# Patient Record
Sex: FEMALE | Race: WHITE | Employment: FULL TIME | URBAN - METROPOLITAN AREA
[De-identification: names, ages, dates, MRNs, and addresses within clinical notes are randomized per-mention and may not be internally consistent; named-entity substitution may affect disease eponyms.]

---

## 2017-02-17 ENCOUNTER — HOSPITAL ENCOUNTER (OUTPATIENT)
Age: 52
Discharge: HOME | End: 2017-02-17
Attending: EMERGENCY MEDICINE
Payer: COMMERCIAL

## 2017-02-17 VITALS
BODY MASS INDEX: 20 KG/M2 | WEIGHT: 104 LBS | TEMPERATURE: 99 F | HEART RATE: 71 BPM | DIASTOLIC BLOOD PRESSURE: 82 MMHG | SYSTOLIC BLOOD PRESSURE: 155 MMHG | RESPIRATION RATE: 18 BRPM

## 2017-02-17 DIAGNOSIS — J01.90 ACUTE SINUSITIS, RECURRENCE NOT SPECIFIED, UNSPECIFIED LOCATION: Primary | ICD-10-CM

## 2017-02-17 LAB — S PYO AG THROAT QL: POSITIVE

## 2017-02-17 PROCEDURE — 99214 OFFICE O/P EST MOD 30 MIN: CPT

## 2017-02-17 PROCEDURE — 87430 STREP A AG IA: CPT

## 2017-02-17 PROCEDURE — 99213 OFFICE O/P EST LOW 20 MIN: CPT

## 2017-02-17 RX ORDER — AMOXICILLIN AND CLAVULANATE POTASSIUM 875; 125 MG/1; MG/1
1 TABLET, FILM COATED ORAL 2 TIMES DAILY
Qty: 14 TABLET | Refills: 0 | Status: SHIPPED | OUTPATIENT
Start: 2017-02-17 | End: 2017-02-24

## 2017-02-17 NOTE — ED PROVIDER NOTES
Patient Seen in: [facility] AND CLINICS Immediate Care In [address] [place] [address]    History   Patient presents with:  Sore Throat  Cough/URI    Stated Complaint: sorethroat/sinus pressure    HPI    Patient is a 49-year-old female with a history of recurrent sinus infection negative except as noted above. PSFH elements reviewed from today and agreed except as otherwise stated in HPI.     Physical Exam       ED Triage Vitals   BP 02/17/17 1509 155/82 mmHg   Pulse 02/17/17 1509 71   Resp 02/17/17 1509 18   Temp 02/17/17 1509 Prescribed:  Current Discharge Medication List

## 2017-02-17 NOTE — ED INITIAL ASSESSMENT (HPI)
Patient has had sore throat and cough for about 10 days. She is flying to [place] for a soccer tournament tonight. She has been exposed to many sick people at work. Patient has taken Teresita Marshall [address]. Chills and night sweats, did not take temperature.  No flu shot t

## 2017-04-04 ENCOUNTER — OFFICE VISIT (OUTPATIENT)
Dept: OBGYN CLINIC | Facility: CLINIC | Age: 52
End: 2017-04-04

## 2017-04-04 VITALS
SYSTOLIC BLOOD PRESSURE: 130 MMHG | BODY MASS INDEX: 21 KG/M2 | WEIGHT: 109.81 LBS | DIASTOLIC BLOOD PRESSURE: 84 MMHG | HEART RATE: 68 BPM

## 2017-04-04 DIAGNOSIS — Z12.31 SCREENING MAMMOGRAM, ENCOUNTER FOR: ICD-10-CM

## 2017-04-04 DIAGNOSIS — Z01.419 ENCOUNTER FOR GYNECOLOGICAL EXAMINATION WITHOUT ABNORMAL FINDING: Primary | ICD-10-CM

## 2017-04-04 DIAGNOSIS — Z12.4 SCREENING FOR MALIGNANT NEOPLASM OF CERVIX: ICD-10-CM

## 2017-04-04 PROCEDURE — 99386 PREV VISIT NEW AGE 40-64: CPT | Performed by: OBSTETRICS & GYNECOLOGY

## 2017-04-24 NOTE — PROGRESS NOTES
HPI:    Patient ID: Tanner Cristina is a 45year old female. HPI  G7  with LMP in 2016. Referred by Brandon Marie. She had a long hx of infertility with 10 previous IVF procedures.  Term  and child is 11 playing soccer as freshman at [employer] PHYSICAL EXAM:   Physical Exam   Constitutional: She appears well-developed and well-nourished. No distress. Neck: Neck supple. No thyromegaly present. Cardiovascular: Normal rate, regular rhythm and normal heart sounds. No murmur heard.   Pulmonar

## 2017-05-16 ENCOUNTER — HOSPITAL ENCOUNTER (OUTPATIENT)
Dept: MAMMOGRAPHY | Age: 52
Discharge: HOME | End: 2017-05-16
Attending: OBSTETRICS & GYNECOLOGY
Payer: COMMERCIAL

## 2017-05-16 DIAGNOSIS — Z12.31 SCREENING MAMMOGRAM, ENCOUNTER FOR: ICD-10-CM

## 2017-05-16 PROCEDURE — 77067 SCR MAMMO BI INCL CAD: CPT | Performed by: OBSTETRICS & GYNECOLOGY

## 2017-10-13 ENCOUNTER — HOSPITAL ENCOUNTER (OUTPATIENT)
Age: 52
Discharge: HOME | End: 2017-10-13
Attending: PEDIATRICS
Payer: COMMERCIAL

## 2017-10-13 VITALS
HEART RATE: 78 BPM | RESPIRATION RATE: 16 BRPM | OXYGEN SATURATION: 97 % | DIASTOLIC BLOOD PRESSURE: 88 MMHG | SYSTOLIC BLOOD PRESSURE: 149 MMHG | BODY MASS INDEX: 21 KG/M2 | WEIGHT: 110 LBS | TEMPERATURE: 98 F

## 2017-10-13 DIAGNOSIS — J01.90 ACUTE SINUSITIS, RECURRENCE NOT SPECIFIED, UNSPECIFIED LOCATION: Primary | ICD-10-CM

## 2017-10-13 PROCEDURE — 99213 OFFICE O/P EST LOW 20 MIN: CPT

## 2017-10-13 PROCEDURE — 99214 OFFICE O/P EST MOD 30 MIN: CPT

## 2017-10-13 RX ORDER — AMOXICILLIN AND CLAVULANATE POTASSIUM 875; 125 MG/1; MG/1
1 TABLET, FILM COATED ORAL 2 TIMES DAILY
Qty: 20 TABLET | Refills: 0 | Status: SHIPPED | OUTPATIENT
Start: 2017-10-13 | End: 2017-10-23

## 2017-10-13 NOTE — ED PROVIDER NOTES
No chief complaint on file. HPI:     Tika Gaffney is a 45year old female who presents for evaluation of a chief complaint of URI symptoms for over 2 weeks.   She states she has had congestion, cough, headache, green nasal discharge for approximatel [id number]  [phone]      As needed

## 2017-10-28 ENCOUNTER — HOSPITAL ENCOUNTER (OUTPATIENT)
Age: 52
Discharge: HOME | End: 2017-10-28
Attending: NURSE PRACTITIONER
Payer: COMMERCIAL

## 2017-10-28 VITALS
BODY MASS INDEX: 21 KG/M2 | SYSTOLIC BLOOD PRESSURE: 127 MMHG | RESPIRATION RATE: 16 BRPM | HEART RATE: 83 BPM | TEMPERATURE: 98 F | DIASTOLIC BLOOD PRESSURE: 84 MMHG | WEIGHT: 110 LBS | OXYGEN SATURATION: 97 %

## 2017-10-28 DIAGNOSIS — J02.0 STREPTOCOCCAL SORE THROAT: Primary | ICD-10-CM

## 2017-10-28 PROCEDURE — 87430 STREP A AG IA: CPT

## 2017-10-28 PROCEDURE — 99213 OFFICE O/P EST LOW 20 MIN: CPT

## 2017-10-28 PROCEDURE — 99214 OFFICE O/P EST MOD 30 MIN: CPT

## 2017-10-28 RX ORDER — AMOXICILLIN 500 MG/1
500 TABLET, FILM COATED ORAL 2 TIMES DAILY
Qty: 20 TABLET | Refills: 0 | Status: SHIPPED | OUTPATIENT
Start: 2017-10-28 | End: 2017-11-07

## 2017-10-28 NOTE — ED PROVIDER NOTES
Patient presents with:  Cough/URI      HPI:     Anders Snow is a 45year old female who presents for evaluation of a chief complaint of sore throat for the last week. Pt reports she was seen here on 10/13 and prescribed augmentin for sinusitis.  Pt repo throat. Rapid strep reviewed and is positive. Patient is nontoxic in appearance, uvula midline, no trismus. No evidence of peritonsillar abscess. Patient is afebrile here swallowing secretions without any difficulty.   Will place patient on amoxicillin

## 2017-11-09 ENCOUNTER — APPOINTMENT (OUTPATIENT)
Dept: CT IMAGING | Facility: HOSPITAL | Age: 52
End: 2017-11-09
Attending: NURSE PRACTITIONER
Payer: COMMERCIAL

## 2017-11-09 ENCOUNTER — HOSPITAL ENCOUNTER (EMERGENCY)
Facility: HOSPITAL | Age: 52
Discharge: HOME | End: 2017-11-09
Payer: COMMERCIAL

## 2017-11-09 VITALS
WEIGHT: 110 LBS | TEMPERATURE: 98 F | BODY MASS INDEX: 21.6 KG/M2 | HEART RATE: 81 BPM | OXYGEN SATURATION: 98 % | HEIGHT: 60 IN | SYSTOLIC BLOOD PRESSURE: 142 MMHG | DIASTOLIC BLOOD PRESSURE: 94 MMHG | RESPIRATION RATE: 16 BRPM

## 2017-11-09 DIAGNOSIS — M54.6 BACK PAIN OF THORACOLUMBAR REGION: Primary | ICD-10-CM

## 2017-11-09 DIAGNOSIS — K82.8 GALLBLADDER SLUDGE: ICD-10-CM

## 2017-11-09 DIAGNOSIS — M54.50 BACK PAIN OF THORACOLUMBAR REGION: Primary | ICD-10-CM

## 2017-11-09 PROCEDURE — 81001 URINALYSIS AUTO W/SCOPE: CPT

## 2017-11-09 PROCEDURE — 85025 COMPLETE CBC W/AUTO DIFF WBC: CPT | Performed by: NURSE PRACTITIONER

## 2017-11-09 PROCEDURE — 36415 COLL VENOUS BLD VENIPUNCTURE: CPT

## 2017-11-09 PROCEDURE — 99284 EMERGENCY DEPT VISIT MOD MDM: CPT

## 2017-11-09 PROCEDURE — 74176 CT ABD & PELVIS W/O CONTRAST: CPT | Performed by: NURSE PRACTITIONER

## 2017-11-09 PROCEDURE — 81025 URINE PREGNANCY TEST: CPT

## 2017-11-09 PROCEDURE — 80048 BASIC METABOLIC PNL TOTAL CA: CPT | Performed by: NURSE PRACTITIONER

## 2017-11-09 PROCEDURE — 96372 THER/PROPH/DIAG INJ SC/IM: CPT

## 2017-11-09 RX ORDER — KETOROLAC TROMETHAMINE 15 MG/ML
15 INJECTION, SOLUTION INTRAMUSCULAR; INTRAVENOUS ONCE
Status: COMPLETED | OUTPATIENT
Start: 2017-11-09 | End: 2017-11-09

## 2017-11-09 RX ORDER — HYDROCODONE BITARTRATE AND ACETAMINOPHEN 5; 325 MG/1; MG/1
1-2 TABLET ORAL EVERY 4 HOURS PRN
Qty: 14 TABLET | Refills: 0 | Status: SHIPPED | OUTPATIENT
Start: 2017-11-09 | End: 2017-11-16

## 2017-11-09 RX ORDER — KETOROLAC TROMETHAMINE 30 MG/ML
15 INJECTION, SOLUTION INTRAMUSCULAR; INTRAVENOUS ONCE
Status: DISCONTINUED | OUTPATIENT
Start: 2017-11-09 | End: 2017-11-09

## 2017-11-09 NOTE — ED INITIAL ASSESSMENT (HPI)
Pt states she woke up in the middle of the night due to pain over the lower 3rd of her back. She states this has never happened before. She tried to walk around, applied heat over the painful area and she even took a bath but the back pain persisted.   Pt

## 2017-11-09 NOTE — ED INITIAL ASSESSMENT (HPI)
Pt states she saw a physical therapist for a right tennis elbow she presumed she got after kayaking a lot the whole summer.

## 2017-11-09 NOTE — ED PROVIDER NOTES
Patient Seen in: [facility] AND [facility] Emergency Department    History   Patient presents with:  Back Pain (musculoskeletal)    Stated Complaint: left back pain started at 2 am    HPI    57-year-old female, with a history of hypertension, hepatitis C, ITP, o LMP 02/01/2016 (Exact Date)   SpO2 (!) 88%   BMI 21.48 kg/m²         Physical Exam   Constitutional: She is oriented to person, place, and time. She appears well-developed and well-nourished. HENT:   Head: Normocephalic.    Eyes: Conjunctivae and EOM ar RAINBOW DRAW LIGHT GREEN   RAINBOW DRAW GOLD   RAINBOW DRAW LAVENDER TALL (BNP)       ED Course as of Nov 09 1914  ------------------------------------------------------------       MDM       Urine with 7 RBC, BSCTA, sats 100 %, CT r/o kidney stone  Eval

## 2017-11-09 NOTE — ED NOTES
Pt has a bruise to right FA brown in color. Pt reports receiving the bruise 4 days ago. Denies injury. No petechiae noted to tongue, arms, or legs. Pt denies cp, sob, n/v, dizziness. Pt resting in bed comfortably. Will continue to monitor.

## 2017-11-10 ENCOUNTER — TELEPHONE (OUTPATIENT)
Dept: OBGYN CLINIC | Facility: CLINIC | Age: 52
End: 2017-11-10

## 2017-11-10 NOTE — TELEPHONE ENCOUNTER
Pt calling to report pelvic pain for the past few weeks. Pt stated she went to the ER yesterday for upper back pain as well. Pt stated they \"ran a bunch of tests and everything was fine\". Pt also had UA done in ER that was negative.  Pt stated that the pe

## 2017-11-21 ENCOUNTER — OFFICE VISIT (OUTPATIENT)
Dept: OBGYN CLINIC | Facility: CLINIC | Age: 52
End: 2017-11-21

## 2017-11-21 VITALS — HEART RATE: 73 BPM | SYSTOLIC BLOOD PRESSURE: 121 MMHG | DIASTOLIC BLOOD PRESSURE: 81 MMHG

## 2017-11-21 DIAGNOSIS — R10.2 FEMALE PELVIC PAIN: Primary | ICD-10-CM

## 2017-11-21 PROCEDURE — 99213 OFFICE O/P EST LOW 20 MIN: CPT | Performed by: OBSTETRICS & GYNECOLOGY

## 2017-11-28 ENCOUNTER — HOSPITAL ENCOUNTER (OUTPATIENT)
Dept: ULTRASOUND IMAGING | Facility: HOSPITAL | Age: 52
Discharge: HOME | End: 2017-11-28
Attending: OBSTETRICS & GYNECOLOGY
Payer: COMMERCIAL

## 2017-11-28 DIAGNOSIS — R10.2 FEMALE PELVIC PAIN: ICD-10-CM

## 2017-11-28 PROCEDURE — 76830 TRANSVAGINAL US NON-OB: CPT | Performed by: OBSTETRICS & GYNECOLOGY

## 2017-11-28 PROCEDURE — 76856 US EXAM PELVIC COMPLETE: CPT | Performed by: OBSTETRICS & GYNECOLOGY

## 2017-11-29 NOTE — PROGRESS NOTES
HPI:    Patient ID: Diann Bello is a 45year old female. HPI  [facility] and [facility]. Here today with c/o 2 months of intermittent BLQ pain. This is happening nearly every day. This different than the left flank pain she c/o in ED.  CT there showed stones requested or ordered in this encounter    Imaging & Referrals:  None       #[id number]

## 2017-12-30 ENCOUNTER — HOSPITAL ENCOUNTER (OUTPATIENT)
Age: 52
Discharge: HOME | End: 2017-12-30
Payer: COMMERCIAL

## 2017-12-30 VITALS
BODY MASS INDEX: 21.6 KG/M2 | OXYGEN SATURATION: 99 % | SYSTOLIC BLOOD PRESSURE: 127 MMHG | WEIGHT: 110 LBS | RESPIRATION RATE: 16 BRPM | DIASTOLIC BLOOD PRESSURE: 90 MMHG | HEIGHT: 60 IN | HEART RATE: 86 BPM | TEMPERATURE: 98 F

## 2017-12-30 DIAGNOSIS — J01.10 ACUTE NON-RECURRENT FRONTAL SINUSITIS: Primary | ICD-10-CM

## 2017-12-30 PROCEDURE — 99214 OFFICE O/P EST MOD 30 MIN: CPT

## 2017-12-30 PROCEDURE — 99213 OFFICE O/P EST LOW 20 MIN: CPT

## 2017-12-30 RX ORDER — FLUTICASONE PROPIONATE 50 MCG
2 SPRAY, SUSPENSION (ML) NASAL DAILY
Qty: 16 G | Refills: 0 | Status: SHIPPED | OUTPATIENT
Start: 2017-12-30 | End: 2018-01-29

## 2017-12-30 RX ORDER — AMOXICILLIN AND CLAVULANATE POTASSIUM 875; 125 MG/1; MG/1
1 TABLET, FILM COATED ORAL 2 TIMES DAILY
Qty: 20 TABLET | Refills: 0 | Status: SHIPPED | OUTPATIENT
Start: 2017-12-30 | End: 2018-01-09

## 2017-12-30 NOTE — ED NOTES
Increase po fluids rest motrin or tylenol for fever aches and pains finished po meds follow up with pcp in 3 days if not better or go to the ed for new or worse cocenrs

## 2017-12-30 NOTE — ED PROVIDER NOTES
Patient presents with:  Cough/URI      HPI:     Anders Clark is a 45year old female who presents with sore throat, headache, facial pressure, sinus congestion,and nasal discharge. Patient reports symptoms started 14 days ago.   Thought she was going to effort    MDM/Assessment/Plan:   Orders for this encounter:    Well-appearing 58-year-old female presents with sinus congestion, headache, facial pressure, sore throat and purulent nasal discharge for the last 14 days.   Based on length of symptoms as well

## 2018-06-11 ENCOUNTER — HOSPITAL ENCOUNTER (OUTPATIENT)
Dept: GENERAL RADIOLOGY | Facility: HOSPITAL | Age: 53
Discharge: HOME | End: 2018-06-11
Attending: INTERNAL MEDICINE
Payer: COMMERCIAL

## 2018-06-11 ENCOUNTER — OFFICE VISIT (OUTPATIENT)
Dept: RHEUMATOLOGY | Facility: CLINIC | Age: 53
End: 2018-06-11

## 2018-06-11 ENCOUNTER — APPOINTMENT (OUTPATIENT)
Dept: LAB | Facility: HOSPITAL | Age: 53
End: 2018-06-11
Attending: INTERNAL MEDICINE
Payer: COMMERCIAL

## 2018-06-11 VITALS
DIASTOLIC BLOOD PRESSURE: 70 MMHG | SYSTOLIC BLOOD PRESSURE: 109 MMHG | BODY MASS INDEX: 20.96 KG/M2 | HEIGHT: 61 IN | HEART RATE: 76 BPM | WEIGHT: 111 LBS

## 2018-06-11 DIAGNOSIS — M25.50 POLYARTHRALGIA: ICD-10-CM

## 2018-06-11 DIAGNOSIS — M79.10 MYALGIA: ICD-10-CM

## 2018-06-11 DIAGNOSIS — M54.41 CHRONIC LOW BACK PAIN WITH RIGHT-SIDED SCIATICA, UNSPECIFIED BACK PAIN LATERALITY: ICD-10-CM

## 2018-06-11 DIAGNOSIS — Z86.2 HISTORY OF ITP: ICD-10-CM

## 2018-06-11 DIAGNOSIS — M54.2 NECK PAIN: ICD-10-CM

## 2018-06-11 DIAGNOSIS — G89.29 CHRONIC LOW BACK PAIN WITH RIGHT-SIDED SCIATICA, UNSPECIFIED BACK PAIN LATERALITY: ICD-10-CM

## 2018-06-11 DIAGNOSIS — Z86.2 HISTORY OF ITP: Primary | ICD-10-CM

## 2018-06-11 PROCEDURE — 85390 FIBRINOLYSINS SCREEN I&R: CPT

## 2018-06-11 PROCEDURE — 86147 CARDIOLIPIN ANTIBODY EA IG: CPT

## 2018-06-11 PROCEDURE — 86140 C-REACTIVE PROTEIN: CPT

## 2018-06-11 PROCEDURE — 99212 OFFICE O/P EST SF 10 MIN: CPT | Performed by: INTERNAL MEDICINE

## 2018-06-11 PROCEDURE — 80053 COMPREHEN METABOLIC PANEL: CPT

## 2018-06-11 PROCEDURE — 85730 THROMBOPLASTIN TIME PARTIAL: CPT

## 2018-06-11 PROCEDURE — 85613 RUSSELL VIPER VENOM DILUTED: CPT

## 2018-06-11 PROCEDURE — 84550 ASSAY OF BLOOD/URIC ACID: CPT

## 2018-06-11 PROCEDURE — 86146 BETA-2 GLYCOPROTEIN ANTIBODY: CPT

## 2018-06-11 PROCEDURE — 72040 X-RAY EXAM NECK SPINE 2-3 VW: CPT | Performed by: INTERNAL MEDICINE

## 2018-06-11 PROCEDURE — 85610 PROTHROMBIN TIME: CPT

## 2018-06-11 PROCEDURE — 36415 COLL VENOUS BLD VENIPUNCTURE: CPT

## 2018-06-11 PROCEDURE — 85652 RBC SED RATE AUTOMATED: CPT

## 2018-06-11 PROCEDURE — 85027 COMPLETE CBC AUTOMATED: CPT

## 2018-06-11 PROCEDURE — 82085 ASSAY OF ALDOLASE: CPT

## 2018-06-11 PROCEDURE — 86038 ANTINUCLEAR ANTIBODIES: CPT

## 2018-06-11 PROCEDURE — 73030 X-RAY EXAM OF SHOULDER: CPT | Performed by: INTERNAL MEDICINE

## 2018-06-11 PROCEDURE — 72202 X-RAY EXAM SI JOINTS 3/> VWS: CPT | Performed by: INTERNAL MEDICINE

## 2018-06-11 PROCEDURE — 99244 OFF/OP CNSLTJ NEW/EST MOD 40: CPT | Performed by: INTERNAL MEDICINE

## 2018-06-11 PROCEDURE — 82550 ASSAY OF CK (CPK): CPT

## 2018-06-11 RX ORDER — RISEDRONATE SODIUM 35 MG/1
35 TABLET, FILM COATED ORAL
COMMUNITY
End: 2018-07-17

## 2018-06-11 RX ORDER — CYCLOBENZAPRINE HCL 5 MG
TABLET ORAL
Qty: 60 TABLET | Refills: 1 | Status: SHIPPED | OUTPATIENT
Start: 2018-06-11 | End: 2018-07-17

## 2018-06-11 NOTE — PROGRESS NOTES
Miya Adam is a 52year old female who presents for Patient presents with:  Neck Pain  Shoulder Pain  Hip Pain: right  Back Pain: lower  . HPI:     I had the pleasure of seeing Miya Adam on 6/11/2018 for evaluation.      She is a pleasant 52 ye 12/30/17 : 110 lb (49.9 kg)    Body mass index is 20.97 kg/m². Current Outpatient Prescriptions:  Acetaminophen (MIDOL OR) Take by mouth as needed. Disp:  Rfl:    aspirin 500 MG Oral Tab Take 500 mg by mouth every 6 (six) hours as needed for Pain.  D GI: No nausea, no vomiiting, no abominal pain, no hx of ulcer, gastritis,  heartburn, no dyshpagia, no BRBPR or melena  Hx of hep c - treated and cured,   : no dysuria, 6 hx of miscarriages, no DVT Hx, no hx of OCP  Hx of kidney stones,   Neuro: right ar Eosinophils Absolute      0.0 - 0.7 K/UL 0.1   Basophils Absolute      0.0 - 0.2 K/UL 0.1   URINE-COLOR      Yellow Yellow   CLARITY URINE      Clear Clear   SPECIFIC GRAVITY      1.002 - 1.035 1.023   PH, URINE      5.0 - 8.0 5.0   PROTEIN (URINE DIPSTICK 1. Significant tendinosis distal supraspinatus and subscapularis tendons. 2. Partial-thickness tear distal supraspinatus along the articular margin     near the distal insertion. Similar findings to previous suspected     partial tear.  Trace bursal fluid

## 2018-06-11 NOTE — PATIENT INSTRUCTIONS
1. Check labs  2. Check xrays  3. Physical therapy for lower back   4. Return to clinic in 3 weeks. 5. Flexeril 5-10mg at night   6. Cont.  Aspirin -

## 2018-07-17 ENCOUNTER — OFFICE VISIT (OUTPATIENT)
Dept: OBGYN CLINIC | Facility: CLINIC | Age: 53
End: 2018-07-17
Payer: COMMERCIAL

## 2018-07-17 VITALS
HEART RATE: 72 BPM | WEIGHT: 109 LBS | SYSTOLIC BLOOD PRESSURE: 152 MMHG | BODY MASS INDEX: 21 KG/M2 | DIASTOLIC BLOOD PRESSURE: 102 MMHG

## 2018-07-17 DIAGNOSIS — Z01.419 ENCOUNTER FOR GYNECOLOGICAL EXAMINATION WITHOUT ABNORMAL FINDING: Primary | ICD-10-CM

## 2018-07-17 DIAGNOSIS — Z12.31 SCREENING MAMMOGRAM, ENCOUNTER FOR: ICD-10-CM

## 2018-07-17 PROCEDURE — 99396 PREV VISIT EST AGE 40-64: CPT | Performed by: OBSTETRICS & GYNECOLOGY

## 2018-07-30 NOTE — PROGRESS NOTES
HPI:    Patient ID: Domingo Bañuelos is a 52year old female.  [facility]. No complaints. Review of Systems   Constitutional: Negative for appetite change, fatigue and unexpected weight change. Eyes: Negative for visual disturbance.    Resp no rebound and no guarding. Genitourinary: Vagina normal and uterus normal. No breast discharge. There is no rash or lesion on the right labia. There is no rash or lesion on the left labia. Uterus is not enlarged and not tender.  Cervix exhibits no motion

## 2018-08-17 ENCOUNTER — OFFICE VISIT (OUTPATIENT)
Dept: RHEUMATOLOGY | Facility: CLINIC | Age: 53
End: 2018-08-17
Payer: COMMERCIAL

## 2018-08-17 ENCOUNTER — HOSPITAL ENCOUNTER (OUTPATIENT)
Dept: MAMMOGRAPHY | Age: 53
Discharge: HOME | End: 2018-08-17
Attending: OBSTETRICS & GYNECOLOGY
Payer: COMMERCIAL

## 2018-08-17 VITALS
BODY MASS INDEX: 20.58 KG/M2 | DIASTOLIC BLOOD PRESSURE: 80 MMHG | WEIGHT: 109 LBS | SYSTOLIC BLOOD PRESSURE: 126 MMHG | HEIGHT: 61 IN | HEART RATE: 80 BPM

## 2018-08-17 DIAGNOSIS — Z12.31 SCREENING MAMMOGRAM, ENCOUNTER FOR: ICD-10-CM

## 2018-08-17 DIAGNOSIS — G89.29 CHRONIC LOW BACK PAIN, UNSPECIFIED BACK PAIN LATERALITY, WITH SCIATICA PRESENCE UNSPECIFIED: Primary | ICD-10-CM

## 2018-08-17 DIAGNOSIS — M54.5 CHRONIC LOW BACK PAIN, UNSPECIFIED BACK PAIN LATERALITY, WITH SCIATICA PRESENCE UNSPECIFIED: Primary | ICD-10-CM

## 2018-08-17 DIAGNOSIS — M79.18 MUSCULOSKELETAL PAIN: ICD-10-CM

## 2018-08-17 PROCEDURE — 99212 OFFICE O/P EST SF 10 MIN: CPT | Performed by: INTERNAL MEDICINE

## 2018-08-17 PROCEDURE — 77063 BREAST TOMOSYNTHESIS BI: CPT | Performed by: OBSTETRICS & GYNECOLOGY

## 2018-08-17 PROCEDURE — 77067 SCR MAMMO BI INCL CAD: CPT | Performed by: OBSTETRICS & GYNECOLOGY

## 2018-08-17 PROCEDURE — 99214 OFFICE O/P EST MOD 30 MIN: CPT | Performed by: INTERNAL MEDICINE

## 2018-08-17 RX ORDER — IBUPROFEN 600 MG/1
600 TABLET ORAL DAILY PRN
Qty: 30 TABLET | Refills: 3 | Status: SHIPPED | OUTPATIENT
Start: 2018-08-17 | End: 2019-03-04

## 2018-08-17 NOTE — PROGRESS NOTES
Marcelle Chen is a 52year old female who presents for Patient presents with:  Joint Pain: results  Knee Pain: right  Hip Pain: right  . HPI:     I had the pleasure of seeing Marcelle Chen on 6/11/2018 for evaluation.      She is a pleasant 52 year ol But now it's right knee and her right hip. Tests negative for RA in 2015 and now negative for lupus and antiphoshpolipid syndrome.    She did physical therapy - for 2 months - she felt it helpe her neck and shoulder - her low back - right side hurts as we Constitutional:No fever, no change in weight or appetitie  Derm: No rashes, no oral ulcers, no alopecia, no photosensitivity, no psoriasis  HEENT: No dry eyes, no dry mouth, no Raynaud's, no nasal ulcers, no parotid swelling, neck pain, no jaw pain, no tem 27.0 - 32.0 pg 30.8   MCHC      32.0 - 37.0 g/dl 33.3   RDW      11.0 - 15.0 % 13.4   Platelet Count      184 - 400 K/ (L)   MEAN PLATELET VOLUME      7.4 - 10.3 fL 9.8   Neutrophils %      % 64   Lymphocytes %      % 24   Monocytes %      % 10 95 - 110 mmol/L 98   Carbon Dioxide, Total      22 - 32 mmol/L 29   BUN      8 - 20 mg/dL 24 (H)   CREATININE      0.50 - 1.50 mg/dL 0.76   CALCIUM      8.5 - 10.5 mg/dL 9.8   ALT (SGPT)      14 - 54 U/L 14   AST (SGOT)      15 - 41 U/L 22   ALKALINE P The BMD of the left femoral neck =  0.635 gm/cm2, T-score =  -1.9,    Z-score =  -0.9. The Findings are consistent with osteopenia. The BMD of the right femoral neck =  0.723 gm/cm2, T-score =  -1.1,    Z-score =  -0.1.   The Findings are consistent with Flexeril 5-10mg at night prn -     3. Hx of hep c - treated at [facility] 2015. Summary:  1. Physical therapy for lower back   2. Ibuprofen 600mg at night -   3. Return to clinic in 6 -12 months.    4.  Flexeril 5-10mg at night       Miladis Amato MD

## 2018-08-17 NOTE — PATIENT INSTRUCTIONS
1. Physical therapy for lower back   2. Ibuprofen 600mg at night -   3. Return to clinic in 6 -12 months.    4.  Flexeril 5-10mg at night

## 2018-09-06 ENCOUNTER — HOSPITAL ENCOUNTER (OUTPATIENT)
Dept: MRI IMAGING | Facility: HOSPITAL | Age: 53
Discharge: HOME | End: 2018-09-06
Attending: ORTHOPAEDIC SURGERY
Payer: COMMERCIAL

## 2018-09-06 DIAGNOSIS — M25.561 RIGHT KNEE PAIN, UNSPECIFIED CHRONICITY: ICD-10-CM

## 2018-09-06 PROCEDURE — 73721 MRI JNT OF LWR EXTRE W/O DYE: CPT | Performed by: ORTHOPAEDIC SURGERY

## 2018-09-10 ENCOUNTER — LAB ENCOUNTER (OUTPATIENT)
Dept: LAB | Facility: HOSPITAL | Age: 53
End: 2018-09-10
Attending: INTERNAL MEDICINE
Payer: COMMERCIAL

## 2018-09-10 DIAGNOSIS — E78.5 DYSLIPIDEMIA: Primary | ICD-10-CM

## 2018-09-10 LAB
CHOLEST SERPL-MCNC: 145 MG/DL (ref 110–200)
HDLC SERPL-MCNC: 57 MG/DL
LDLC SERPL CALC-MCNC: 72 MG/DL (ref 0–99)
NONHDLC SERPL-MCNC: 88 MG/DL
TRIGL SERPL-MCNC: 78 MG/DL (ref 1–149)

## 2018-09-10 PROCEDURE — 36415 COLL VENOUS BLD VENIPUNCTURE: CPT

## 2018-09-10 PROCEDURE — 80061 LIPID PANEL: CPT

## 2018-09-27 ENCOUNTER — HOSPITAL ENCOUNTER (OUTPATIENT)
Age: 53
Discharge: HOME | End: 2018-09-27
Payer: COMMERCIAL

## 2018-09-27 VITALS
DIASTOLIC BLOOD PRESSURE: 95 MMHG | OXYGEN SATURATION: 99 % | TEMPERATURE: 98 F | RESPIRATION RATE: 16 BRPM | BODY MASS INDEX: 21 KG/M2 | WEIGHT: 110 LBS | HEART RATE: 85 BPM | SYSTOLIC BLOOD PRESSURE: 146 MMHG

## 2018-09-27 DIAGNOSIS — J01.40 ACUTE NON-RECURRENT PANSINUSITIS: Primary | ICD-10-CM

## 2018-09-27 PROCEDURE — 99214 OFFICE O/P EST MOD 30 MIN: CPT

## 2018-09-27 RX ORDER — LOSARTAN POTASSIUM AND HYDROCHLOROTHIAZIDE 12.5; 1 MG/1; MG/1
1 TABLET ORAL DAILY
Status: ON HOLD | COMMUNITY
Start: 2018-08-15 | End: 2020-07-19

## 2018-09-27 RX ORDER — AMOXICILLIN AND CLAVULANATE POTASSIUM 875; 125 MG/1; MG/1
1 TABLET, FILM COATED ORAL 2 TIMES DAILY
Qty: 20 TABLET | Refills: 0 | Status: SHIPPED | OUTPATIENT
Start: 2018-09-27 | End: 2018-10-07

## 2018-10-01 NOTE — ED PROVIDER NOTES
Patient presents with:  Cough/URI      HPI:     Rowena Lai is a 52year old female with no significant past medical history presents with runny nose, facial pressure, headache and sinus congestion over the course of the last week.   Patient denies any sinusitis. Patient also instructed on supportive care and close follow-up with her primary care provider.   Patient verbalized plan of care and stated understanding peer    Orders Placed This Encounter      Amoxicillin-Pot Clavulanate 875-125 MG Oral Tab

## 2018-10-31 ENCOUNTER — HOSPITAL ENCOUNTER (OUTPATIENT)
Age: 53
Discharge: HOME | End: 2018-10-31
Attending: EMERGENCY MEDICINE
Payer: COMMERCIAL

## 2018-10-31 VITALS
WEIGHT: 105 LBS | HEART RATE: 84 BPM | DIASTOLIC BLOOD PRESSURE: 88 MMHG | RESPIRATION RATE: 16 BRPM | BODY MASS INDEX: 20 KG/M2 | TEMPERATURE: 98 F | SYSTOLIC BLOOD PRESSURE: 157 MMHG | OXYGEN SATURATION: 100 %

## 2018-10-31 DIAGNOSIS — J02.8 ACUTE BACTERIAL PHARYNGITIS: Primary | ICD-10-CM

## 2018-10-31 DIAGNOSIS — B96.89 ACUTE BACTERIAL PHARYNGITIS: Primary | ICD-10-CM

## 2018-10-31 PROCEDURE — 99213 OFFICE O/P EST LOW 20 MIN: CPT

## 2018-10-31 PROCEDURE — 99214 OFFICE O/P EST MOD 30 MIN: CPT

## 2018-10-31 RX ORDER — CEFDINIR 300 MG/1
300 CAPSULE ORAL 2 TIMES DAILY
Qty: 20 CAPSULE | Refills: 0 | Status: SHIPPED | OUTPATIENT
Start: 2018-10-31 | End: 2018-11-10

## 2018-10-31 NOTE — ED INITIAL ASSESSMENT (HPI)
HAS ITP AND NO SPLEEN HERE LAST MONTH WITH URI AND PUT ON AMOXICILLIN SINCE Monday SORE THROAT AND EAR COURTNEY

## 2018-10-31 NOTE — ED PROVIDER NOTES
Patient Seen in: [facility] AND CLINICS Immediate Care In [place]      Stated Complaint: sore throat,ear ache    HPI    Patient complains of sore throat and bilateral ear pain which have been present for the last 3 days.   The patient does not think she has h nonicteric  ENT ears tympanic membrane's normal appearance bilaterally.   On exam of the throat there is no tonsillar exudate, erythema uvula midline  Neck is bilateral submandibular adenopathy tender to palpation without overlying erythema or fluctuance  L

## 2019-03-04 ENCOUNTER — HOSPITAL ENCOUNTER (OUTPATIENT)
Age: 54
Discharge: HOME | End: 2019-03-04
Attending: EMERGENCY MEDICINE
Payer: COMMERCIAL

## 2019-03-04 ENCOUNTER — APPOINTMENT (OUTPATIENT)
Dept: GENERAL RADIOLOGY | Age: 54
End: 2019-03-04
Attending: EMERGENCY MEDICINE
Payer: COMMERCIAL

## 2019-03-04 VITALS
TEMPERATURE: 98 F | WEIGHT: 110 LBS | BODY MASS INDEX: 21 KG/M2 | DIASTOLIC BLOOD PRESSURE: 90 MMHG | SYSTOLIC BLOOD PRESSURE: 142 MMHG | HEART RATE: 74 BPM | OXYGEN SATURATION: 96 % | RESPIRATION RATE: 16 BRPM

## 2019-03-04 DIAGNOSIS — R07.89 CHEST WALL PAIN: ICD-10-CM

## 2019-03-04 DIAGNOSIS — J06.9 UPPER RESPIRATORY TRACT INFECTION, UNSPECIFIED TYPE: Primary | ICD-10-CM

## 2019-03-04 PROCEDURE — 71046 X-RAY EXAM CHEST 2 VIEWS: CPT | Performed by: EMERGENCY MEDICINE

## 2019-03-04 PROCEDURE — 99214 OFFICE O/P EST MOD 30 MIN: CPT

## 2019-03-04 PROCEDURE — 93010 ELECTROCARDIOGRAM REPORT: CPT

## 2019-03-05 NOTE — ED INITIAL ASSESSMENT (HPI)
Pt states she started with chest pain yesterday then a cough and now has left ear pain and congestion.

## 2019-03-05 NOTE — ED PROVIDER NOTES
Patient Seen in: [facility] AND CLINICS Immediate Care In [address] [place] [address]    History   Patient presents with:  Chest Pain    Stated Complaint: uri    HPI    61 yo female with cough, congestion and ear pain.  Also c/o pain in the upper chest. She did start working out distress. She exhibits tenderness (upper chest wall ). Neurological: She is alert. No sensory deficit. She exhibits normal muscle tone. Skin: Skin is warm and dry. Capillary refill takes less than 2 seconds.    Psychiatric: She has a normal mood and aff

## 2019-08-08 ENCOUNTER — TELEPHONE (OUTPATIENT)
Dept: OBGYN CLINIC | Facility: CLINIC | Age: 54
End: 2019-08-08

## 2019-08-08 DIAGNOSIS — R39.15 URGENCY OF URINATION: Primary | ICD-10-CM

## 2019-08-08 DIAGNOSIS — R35.0 URINARY FREQUENCY: ICD-10-CM

## 2019-08-08 NOTE — TELEPHONE ENCOUNTER
Pt repots pelvic pain, cramping and back pain. Pt states pelvic pain and cramping for the last 6 months and rating 4-5/10. Pt reports pain as \"twinges and comes and goes\". Pt states it is more on lower right side. Pt states she is having \"horrific back pain that I thought was sciatic pain\" for the last 8 months. Pt states back pain is 9/10 \"sometimes\" and is the lower right side. Pt states the pelvic and pain has increased that it awoke her from her sleep last night. Pt states she is taking Sudarshan back and body and Midol which helps dull the pain. Pt states she went to an Orthopedic about 8 months ago and was told \"it's probably arthritis\". Pt reports urgency and frequency \"for quite some time\". Advised pt she can try using heating pad for some relief. Advised pt she can try  Ibuprofen 600 mg with food every 6 hours PRN. Advised pt if she tries Ibuprofen then to discontinue Sudarshan and Midol. Advised pt to go to lab to UA to rule out UTI. Lab hours provided to pt. Advised pt if pain increases 8/10 to go to ED. Informed pt to keep appt for 8/15/19 with [facility] as that appt will be a problem visit and will need to return for annual exam. Informed pt message will be sent to [facility] for any further recs and if any we will inform pt. Pt verbalized understanding.

## 2019-08-15 ENCOUNTER — OFFICE VISIT (OUTPATIENT)
Dept: OBGYN CLINIC | Facility: CLINIC | Age: 54
End: 2019-08-15
Payer: COMMERCIAL

## 2019-08-15 VITALS
DIASTOLIC BLOOD PRESSURE: 90 MMHG | SYSTOLIC BLOOD PRESSURE: 150 MMHG | BODY MASS INDEX: 21 KG/M2 | WEIGHT: 110 LBS | HEART RATE: 75 BPM

## 2019-08-15 DIAGNOSIS — M53.3 SACRO-ILIAC PAIN: ICD-10-CM

## 2019-08-15 DIAGNOSIS — Z12.31 SCREENING MAMMOGRAM, ENCOUNTER FOR: ICD-10-CM

## 2019-08-15 DIAGNOSIS — Z01.419 ENCOUNTER FOR GYNECOLOGICAL EXAMINATION WITHOUT ABNORMAL FINDING: Primary | ICD-10-CM

## 2019-08-15 PROCEDURE — 99396 PREV VISIT EST AGE 40-64: CPT | Performed by: OBSTETRICS & GYNECOLOGY

## 2019-08-26 NOTE — PROGRESS NOTES
HPI:    Patient ID: Anders Snow is a 62year old female. HPI  [facility] and [facility]. She is here today for a problem.  She states she has about 8 month hx of pelvic and back pain but on further questioning, it arises form right S-I joint area and radiates normal heart sounds. No murmur heard. Pulmonary/Chest: Effort normal and breath sounds normal. She has no wheezes. She has no rales. No breast discharge. Bilateral breasts without skin / nipple changes, mass, tenderness or axillary adenopathy.      Abd

## 2019-08-28 ENCOUNTER — LAB ENCOUNTER (OUTPATIENT)
Dept: LAB | Facility: HOSPITAL | Age: 54
End: 2019-08-28
Attending: INTERNAL MEDICINE
Payer: COMMERCIAL

## 2019-08-28 DIAGNOSIS — I10 ESSENTIAL HYPERTENSION, BENIGN: Primary | ICD-10-CM

## 2019-08-28 LAB
ALBUMIN SERPL-MCNC: 4 G/DL (ref 3.4–5)
ALBUMIN/GLOB SERPL: 1 {RATIO} (ref 1–2)
ALP LIVER SERPL-CCNC: 68 U/L (ref 46–118)
ALT SERPL-CCNC: 16 U/L (ref 13–56)
ANION GAP SERPL CALC-SCNC: 4 MMOL/L (ref 0–18)
AST SERPL-CCNC: 18 U/L (ref 15–37)
BILIRUB SERPL-MCNC: 0.5 MG/DL (ref 0.1–2)
BUN BLD-MCNC: 25 MG/DL (ref 7–18)
BUN/CREAT SERPL: 29.8 (ref 10–20)
CALCIUM BLD-MCNC: 9 MG/DL (ref 8.5–10.1)
CHLORIDE SERPL-SCNC: 104 MMOL/L (ref 98–112)
CO2 SERPL-SCNC: 32 MMOL/L (ref 21–32)
CREAT BLD-MCNC: 0.84 MG/DL (ref 0.55–1.02)
GLOBULIN PLAS-MCNC: 4.2 G/DL (ref 2.8–4.4)
GLUCOSE BLD-MCNC: 81 MG/DL (ref 70–99)
M PROTEIN MFR SERPL ELPH: 8.2 G/DL (ref 6.4–8.2)
OSMOLALITY SERPL CALC.SUM OF ELEC: 293 MOSM/KG (ref 275–295)
PATIENT FASTING: NO
POTASSIUM SERPL-SCNC: 3.3 MMOL/L (ref 3.5–5.1)
SODIUM SERPL-SCNC: 140 MMOL/L (ref 136–145)

## 2019-08-28 PROCEDURE — 36415 COLL VENOUS BLD VENIPUNCTURE: CPT

## 2019-08-28 PROCEDURE — 80053 COMPREHEN METABOLIC PANEL: CPT

## 2019-09-06 ENCOUNTER — LAB ENCOUNTER (OUTPATIENT)
Dept: LAB | Facility: HOSPITAL | Age: 54
End: 2019-09-06
Attending: PHYSICAL MEDICINE & REHABILITATION
Payer: COMMERCIAL

## 2019-09-06 DIAGNOSIS — Z01.818 OTHER SPECIFIED PRE-OPERATIVE EXAMINATION: Primary | ICD-10-CM

## 2019-09-06 PROCEDURE — 93010 ELECTROCARDIOGRAM REPORT: CPT | Performed by: PHYSICAL MEDICINE & REHABILITATION

## 2019-09-06 PROCEDURE — 93005 ELECTROCARDIOGRAM TRACING: CPT

## 2019-09-27 ENCOUNTER — TELEPHONE (OUTPATIENT)
Dept: OBGYN CLINIC | Facility: CLINIC | Age: 54
End: 2019-09-27

## 2019-09-27 DIAGNOSIS — G89.29 CHRONIC PELVIC PAIN IN FEMALE: Primary | ICD-10-CM

## 2019-09-27 DIAGNOSIS — R10.2 CHRONIC PELVIC PAIN IN FEMALE: Primary | ICD-10-CM

## 2019-09-27 NOTE — TELEPHONE ENCOUNTER
C/O CONTINUED PELVIC AND BACK PAIN. PT DID MASSAGE THERAPY AND  EPIDURAL STEROID INJECTION IN HER BACK ABOUT 1 MONTH AGO AND IT DID NOT RELIEVE ANY OF THE PELVIC OR BACK PAIN. STATES ORTHOPOD DID PELVIC EXAM AND FELT HER OVARIES WERE NORMAL.   FEELS LIKE

## 2019-09-28 ENCOUNTER — HOSPITAL ENCOUNTER (OUTPATIENT)
Dept: MAMMOGRAPHY | Age: 54
Discharge: HOME | End: 2019-09-28
Attending: OBSTETRICS & GYNECOLOGY
Payer: COMMERCIAL

## 2019-09-28 DIAGNOSIS — Z12.31 SCREENING MAMMOGRAM, ENCOUNTER FOR: ICD-10-CM

## 2019-09-28 PROCEDURE — 77063 BREAST TOMOSYNTHESIS BI: CPT | Performed by: OBSTETRICS & GYNECOLOGY

## 2019-09-28 PROCEDURE — 77067 SCR MAMMO BI INCL CAD: CPT | Performed by: OBSTETRICS & GYNECOLOGY

## 2019-09-30 NOTE — TELEPHONE ENCOUNTER
. Her history and exam pointed to a musculo-skeletal origin but with continued pain and no help with steroid, then I believe we should order pelvic US. Please use chronic pelvic pain in female as diagnosis. We'll discuss results as soon as available.  Thank

## 2019-11-17 ENCOUNTER — OFFICE VISIT (OUTPATIENT)
Dept: FAMILY MEDICINE CLINIC | Facility: CLINIC | Age: 54
End: 2019-11-17
Payer: COMMERCIAL

## 2019-11-17 VITALS
HEIGHT: 60 IN | SYSTOLIC BLOOD PRESSURE: 118 MMHG | DIASTOLIC BLOOD PRESSURE: 78 MMHG | WEIGHT: 110 LBS | BODY MASS INDEX: 21.6 KG/M2 | HEART RATE: 78 BPM | OXYGEN SATURATION: 97 % | TEMPERATURE: 97 F

## 2019-11-17 DIAGNOSIS — R09.81 SINUS CONGESTION: ICD-10-CM

## 2019-11-17 DIAGNOSIS — J06.9 ACUTE UPPER RESPIRATORY INFECTION: Primary | ICD-10-CM

## 2019-11-17 PROCEDURE — 99202 OFFICE O/P NEW SF 15 MIN: CPT | Performed by: PHYSICIAN ASSISTANT

## 2019-11-17 RX ORDER — AMOXICILLIN AND CLAVULANATE POTASSIUM 875; 125 MG/1; MG/1
1 TABLET, FILM COATED ORAL 2 TIMES DAILY
Qty: 14 TABLET | Refills: 0 | Status: SHIPPED | OUTPATIENT
Start: 2019-11-17 | End: 2019-11-24

## 2019-11-17 RX ORDER — HYDROCHLOROTHIAZIDE 25 MG/1
25 TABLET ORAL DAILY
Status: ON HOLD | COMMUNITY
Start: 2019-08-29 | End: 2020-07-22

## 2019-11-17 NOTE — PATIENT INSTRUCTIONS
Sinusitis     The sinuses are air-filled spaces within the bones of the face. They connect to the inside of the nose. Sinusitis is an inflammation of the tissue that lines the sinuses.  Sinusitis can occur during a cold. It can also happen due to allergie · Use acetaminophen or ibuprofen to control pain, unless another pain medicine was prescribed to you. If you have chronic liver or kidney disease or ever had a stomach ulcer, talk with your healthcare provider before using these medicines.  (Aspirin should

## 2019-11-17 NOTE — PROGRESS NOTES
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 They connect to the inside of the nose. Sinusitis is an inflammation of the tissue that lines the sinuses.  Sinusitis can occur during a cold. It can also · Use acetaminophen or ibuprofen to control pain, unless another pain medicine was prescribed to you. If you have chronic liver or kidney disease or ever had a stomach ulcer, talk with your healthcare provider before using these medicines.  (Aspirin should

## 2019-12-09 ENCOUNTER — HOSPITAL ENCOUNTER (OUTPATIENT)
Age: 54
Discharge: HOME | End: 2019-12-09
Attending: EMERGENCY MEDICINE
Payer: COMMERCIAL

## 2019-12-09 VITALS
WEIGHT: 110 LBS | HEIGHT: 60 IN | DIASTOLIC BLOOD PRESSURE: 76 MMHG | OXYGEN SATURATION: 98 % | BODY MASS INDEX: 21.6 KG/M2 | SYSTOLIC BLOOD PRESSURE: 117 MMHG | TEMPERATURE: 100 F | RESPIRATION RATE: 20 BRPM | HEART RATE: 119 BPM

## 2019-12-09 DIAGNOSIS — J11.1 INFLUENZA: Primary | ICD-10-CM

## 2019-12-09 PROCEDURE — 87502 INFLUENZA DNA AMP PROBE: CPT | Performed by: EMERGENCY MEDICINE

## 2019-12-09 PROCEDURE — 99214 OFFICE O/P EST MOD 30 MIN: CPT

## 2019-12-09 PROCEDURE — 99213 OFFICE O/P EST LOW 20 MIN: CPT

## 2019-12-09 RX ORDER — FLUTICASONE PROPIONATE 50 MCG
2 SPRAY, SUSPENSION (ML) NASAL DAILY
Qty: 16 G | Refills: 0 | Status: SHIPPED | OUTPATIENT
Start: 2019-12-09 | End: 2020-01-08

## 2019-12-09 RX ORDER — AZITHROMYCIN 250 MG/1
TABLET, FILM COATED ORAL
Qty: 1 PACKAGE | Refills: 0 | Status: SHIPPED | OUTPATIENT
Start: 2019-12-09 | End: 2020-01-08

## 2019-12-09 RX ORDER — ECHINACEA PURPUREA EXTRACT 125 MG
2 TABLET ORAL AS NEEDED
Qty: 60 ML | Refills: 0 | Status: SHIPPED | OUTPATIENT
Start: 2019-12-09 | End: 2019-12-14

## 2019-12-09 RX ORDER — OSELTAMIVIR PHOSPHATE 75 MG/1
75 CAPSULE ORAL 2 TIMES DAILY
Qty: 10 CAPSULE | Refills: 0 | Status: SHIPPED | OUTPATIENT
Start: 2019-12-09 | End: 2020-01-08

## 2019-12-09 RX ORDER — BENZONATATE 100 MG/1
100 CAPSULE ORAL 3 TIMES DAILY PRN
Qty: 30 CAPSULE | Refills: 0 | Status: SHIPPED | OUTPATIENT
Start: 2019-12-09 | End: 2020-01-08

## 2019-12-09 NOTE — ED INITIAL ASSESSMENT (HPI)
COUGH COLD PRODUCTIVE sputum fever since yesterday general aches and pains. No flu shot.  Sister- has flu Request flu test. 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

## 2019-12-09 NOTE — ED PROVIDER NOTES
Patient Seen in: [facility] AND CLINICS Immediate Care In [address] [place] [address]    History   Patient presents with:  Cough/URI    Stated Complaint: flu symptoms    HPI    Patient here with fever, body aches, headache, sore throat, cough, congestion for 1 days.   No travel, problem. Social History    Tobacco Use      Smoking status: Never Smoker      Smokeless tobacco: Never Used      Tobacco comment: rarely    Alcohol use:  Yes      Alcohol/week: 0.0 standard drinks      Comment: RARELY    Drug use: No      Review of System worsen      Medications Prescribed:  Current Discharge Medication List    START taking these medications    Saline Nasal Spray (AYR) 0.65 % Nasal Solution  2 sprays by Nasal route as needed for congestion.  3-4x/day  Qty: 60 mL Refills: 0    Fluticasone Pro

## 2019-12-09 NOTE — ED NOTES
Rest good hand washing call and make appointment for follow up with pcp. Discharge instructions reviewed, fever protocol reviewed/  Go to the ed for new or worse concerns shortness of breath chest pain high fever, weakness. ...

## 2020-01-08 ENCOUNTER — OFFICE VISIT (OUTPATIENT)
Dept: RHEUMATOLOGY | Facility: CLINIC | Age: 55
End: 2020-01-08
Payer: COMMERCIAL

## 2020-01-08 ENCOUNTER — HOSPITAL ENCOUNTER (OUTPATIENT)
Dept: GENERAL RADIOLOGY | Age: 55
Discharge: HOME | End: 2020-01-08
Attending: INTERNAL MEDICINE
Payer: COMMERCIAL

## 2020-01-08 VITALS
HEART RATE: 72 BPM | WEIGHT: 112 LBS | HEIGHT: 60 IN | SYSTOLIC BLOOD PRESSURE: 147 MMHG | BODY MASS INDEX: 21.99 KG/M2 | DIASTOLIC BLOOD PRESSURE: 87 MMHG

## 2020-01-08 DIAGNOSIS — M54.50 CHRONIC RIGHT-SIDED LOW BACK PAIN WITHOUT SCIATICA: Primary | ICD-10-CM

## 2020-01-08 DIAGNOSIS — M54.50 CHRONIC RIGHT-SIDED LOW BACK PAIN WITHOUT SCIATICA: ICD-10-CM

## 2020-01-08 DIAGNOSIS — G89.29 CHRONIC RIGHT-SIDED LOW BACK PAIN WITHOUT SCIATICA: Primary | ICD-10-CM

## 2020-01-08 DIAGNOSIS — G89.29 CHRONIC RIGHT-SIDED LOW BACK PAIN WITHOUT SCIATICA: ICD-10-CM

## 2020-01-08 PROCEDURE — 99214 OFFICE O/P EST MOD 30 MIN: CPT | Performed by: INTERNAL MEDICINE

## 2020-01-08 PROCEDURE — 72110 X-RAY EXAM L-2 SPINE 4/>VWS: CPT | Performed by: INTERNAL MEDICINE

## 2020-01-08 RX ORDER — NAPROXEN 500 MG/1
TABLET ORAL
Qty: 28 TABLET | Refills: 0 | Status: ON HOLD | OUTPATIENT
Start: 2020-01-08 | End: 2020-07-19

## 2020-01-08 RX ORDER — METHYLPREDNISOLONE 4 MG/1
TABLET ORAL
Qty: 1 PACKAGE | Refills: 0 | Status: ON HOLD | OUTPATIENT
Start: 2020-01-08 | End: 2020-07-19

## 2020-01-09 NOTE — PROGRESS NOTES
Dear Dr. Melania Johnson:    I saw your patient Steph Fonseca in consultation this evening at your request, for evaluation of right lower back pain. As you know, she is a 69-year-old woman who has a history of low back pain and sciatica.   She is physically activ was on high doses of prednisone for a while. She has hypertension. She was treated for chronic hepatitis C at the [address] [place] [address] in  with success. She had a , sinus surgery, right knee arthroscopic meniscal tear surgery, splenectomy. well with some crepitance in her right knee. Ankles move well per the balls of her feet are nontender to squeeze. Assessment and plan:    1. Long history of probable mechanical low back pain.   There is myofascial discomfort from her neck into her shou

## 2020-02-29 ENCOUNTER — HOSPITAL ENCOUNTER (OUTPATIENT)
Dept: ULTRASOUND IMAGING | Facility: HOSPITAL | Age: 55
Discharge: HOME | End: 2020-02-29
Attending: OBSTETRICS & GYNECOLOGY
Payer: COMMERCIAL

## 2020-02-29 DIAGNOSIS — G89.29 CHRONIC PELVIC PAIN IN FEMALE: ICD-10-CM

## 2020-02-29 DIAGNOSIS — R10.2 CHRONIC PELVIC PAIN IN FEMALE: ICD-10-CM

## 2020-02-29 PROCEDURE — 76856 US EXAM PELVIC COMPLETE: CPT | Performed by: OBSTETRICS & GYNECOLOGY

## 2020-02-29 PROCEDURE — 76830 TRANSVAGINAL US NON-OB: CPT | Performed by: OBSTETRICS & GYNECOLOGY

## 2020-03-22 ENCOUNTER — HOSPITAL ENCOUNTER (OUTPATIENT)
Age: 55
Discharge: HOME | End: 2020-03-22
Attending: EMERGENCY MEDICINE
Payer: COMMERCIAL

## 2020-03-22 VITALS
RESPIRATION RATE: 16 BRPM | BODY MASS INDEX: 21.2 KG/M2 | HEIGHT: 60 IN | HEART RATE: 82 BPM | DIASTOLIC BLOOD PRESSURE: 85 MMHG | OXYGEN SATURATION: 98 % | WEIGHT: 108 LBS | SYSTOLIC BLOOD PRESSURE: 143 MMHG | TEMPERATURE: 98 F

## 2020-03-22 DIAGNOSIS — L85.3 DRY SKIN DERMATITIS: Primary | ICD-10-CM

## 2020-03-22 DIAGNOSIS — L29.9 PRURITUS: ICD-10-CM

## 2020-03-22 LAB
#MXD IC: 0.7 X10ˆ3/UL (ref 0.1–1)
HCT VFR BLD AUTO: 44.1 % (ref 35–48)
HGB BLD-MCNC: 14.8 G/DL (ref 12–16)
LYMPHOCYTES # BLD AUTO: 2.1 X10ˆ3/UL (ref 1–4)
LYMPHOCYTES NFR BLD AUTO: 29.1 %
MCH RBC QN AUTO: 31 PG (ref 26–34)
MCHC RBC AUTO-ENTMCNC: 33.6 G/DL (ref 31–37)
MCV RBC AUTO: 92.5 FL (ref 80–100)
MIXED CELL %: 10.3 %
NEUTROPHILS # BLD AUTO: 4.3 X10ˆ3/UL (ref 1.5–7.7)
NEUTROPHILS NFR BLD AUTO: 60.6 %
PLATELET # BLD AUTO: 182 X10ˆ3/UL (ref 150–450)
RBC # BLD AUTO: 4.77 X10ˆ6/UL (ref 3.8–5.3)
WBC # BLD AUTO: 7.1 X10ˆ3/UL (ref 4–11)

## 2020-03-22 PROCEDURE — 99213 OFFICE O/P EST LOW 20 MIN: CPT

## 2020-03-22 PROCEDURE — 85025 COMPLETE CBC W/AUTO DIFF WBC: CPT | Performed by: EMERGENCY MEDICINE

## 2020-03-22 PROCEDURE — 36415 COLL VENOUS BLD VENIPUNCTURE: CPT

## 2020-03-22 NOTE — ED PROVIDER NOTES
Patient Seen in: [facility] AND CLINICS Immediate Care In [address] [place] [address]      History   Patient presents with:   Allergic Rxn Allergies    Stated Complaint: Burning/Itching Back    HPI  She complains of itchy dry skin getting progressively worse over the last week o Resp 16   Ht 152.4 cm (5')   Wt 49 kg   LMP 02/01/2016 (Exact Date)   SpO2 98%   BMI 21.09 kg/m²         Physical Exam  Vitals signs and nursing note reviewed. Constitutional:       General: She is not in acute distress.      Appearance: She is well-devel am    Follow-up:  Eddie Stevenson  [address]    Schedule an appointment as soon as possible for a visit in 3 days  For follow-up        Medications Prescribed:  Current Discharge Medication List

## 2020-03-22 NOTE — ED INITIAL ASSESSMENT (HPI)
Pt here with c/o itching, burning and dry skin for the last 3 days. Denies any rash, no new meds lotions, soap or foods.  Denies sob or any respiratory issues

## 2020-07-19 ENCOUNTER — HOSPITAL ENCOUNTER (INPATIENT)
Facility: HOSPITAL | Age: 55
LOS: 3 days | Discharge: HOME | DRG: 064 | End: 2020-07-22
Attending: EMERGENCY MEDICINE | Admitting: INTERNAL MEDICINE
Payer: COMMERCIAL

## 2020-07-19 ENCOUNTER — APPOINTMENT (OUTPATIENT)
Dept: MRI IMAGING | Facility: HOSPITAL | Age: 55
DRG: 064 | End: 2020-07-19
Attending: EMERGENCY MEDICINE
Payer: COMMERCIAL

## 2020-07-19 ENCOUNTER — APPOINTMENT (OUTPATIENT)
Dept: CT IMAGING | Facility: HOSPITAL | Age: 55
DRG: 064 | End: 2020-07-19
Attending: EMERGENCY MEDICINE
Payer: COMMERCIAL

## 2020-07-19 DIAGNOSIS — I77.71 INTERNAL CAROTID ARTERY DISSECTION (HCC): ICD-10-CM

## 2020-07-19 DIAGNOSIS — I63.9 CEREBROVASCULAR ACCIDENT (CVA), UNSPECIFIED MECHANISM (HCC): Primary | ICD-10-CM

## 2020-07-19 LAB
ANION GAP SERPL CALC-SCNC: 5 MMOL/L (ref 0–18)
BASOPHILS # BLD AUTO: 0.1 X10(3) UL (ref 0–0.2)
BASOPHILS NFR BLD AUTO: 1 %
BUN BLD-MCNC: 23 MG/DL (ref 7–18)
BUN/CREAT SERPL: 32.4 (ref 10–20)
CALCIUM BLD-MCNC: 9.8 MG/DL (ref 8.5–10.1)
CHLORIDE SERPL-SCNC: 103 MMOL/L (ref 98–112)
CO2 SERPL-SCNC: 30 MMOL/L (ref 21–32)
CREAT BLD-MCNC: 0.71 MG/DL (ref 0.55–1.02)
DEPRECATED RDW RBC AUTO: 45.4 FL (ref 35.1–46.3)
EOSINOPHIL # BLD AUTO: 0.25 X10(3) UL (ref 0–0.7)
EOSINOPHIL NFR BLD AUTO: 2.4 %
ERYTHROCYTE [DISTWIDTH] IN BLOOD BY AUTOMATED COUNT: 13.6 % (ref 11–15)
GLUCOSE BLD-MCNC: 99 MG/DL (ref 70–99)
HCT VFR BLD AUTO: 39.5 % (ref 35–48)
HGB BLD-MCNC: 13.6 G/DL (ref 12–16)
IMM GRANULOCYTES # BLD AUTO: 0.03 X10(3) UL (ref 0–1)
IMM GRANULOCYTES NFR BLD: 0.3 %
LYMPHOCYTES # BLD AUTO: 3.61 X10(3) UL (ref 1–4)
LYMPHOCYTES NFR BLD AUTO: 34.7 %
MCH RBC QN AUTO: 31.2 PG (ref 26–34)
MCHC RBC AUTO-ENTMCNC: 34.4 G/DL (ref 31–37)
MCV RBC AUTO: 90.6 FL (ref 80–100)
MONOCYTES # BLD AUTO: 1 X10(3) UL (ref 0.1–1)
MONOCYTES NFR BLD AUTO: 9.6 %
NEUTROPHILS # BLD AUTO: 5.41 X10 (3) UL (ref 1.5–7.7)
NEUTROPHILS # BLD AUTO: 5.41 X10(3) UL (ref 1.5–7.7)
NEUTROPHILS NFR BLD AUTO: 52 %
OSMOLALITY SERPL CALC.SUM OF ELEC: 290 MOSM/KG (ref 275–295)
PLATELET # BLD AUTO: 242 10(3)UL (ref 150–450)
POTASSIUM SERPL-SCNC: 3.7 MMOL/L (ref 3.5–5.1)
RBC # BLD AUTO: 4.36 X10(6)UL (ref 3.8–5.3)
SARS-COV-2 RNA RESP QL NAA+PROBE: NOT DETECTED
SODIUM SERPL-SCNC: 138 MMOL/L (ref 136–145)
WBC # BLD AUTO: 10.4 X10(3) UL (ref 4–11)

## 2020-07-19 PROCEDURE — 70496 CT ANGIOGRAPHY HEAD: CPT | Performed by: EMERGENCY MEDICINE

## 2020-07-19 PROCEDURE — 70498 CT ANGIOGRAPHY NECK: CPT | Performed by: EMERGENCY MEDICINE

## 2020-07-19 PROCEDURE — 70553 MRI BRAIN STEM W/O & W/DYE: CPT | Performed by: EMERGENCY MEDICINE

## 2020-07-19 RX ORDER — POTASSIUM CHLORIDE 750 MG/1
20 TABLET, EXTENDED RELEASE ORAL DAILY
Status: DISCONTINUED | OUTPATIENT
Start: 2020-07-20 | End: 2020-07-22

## 2020-07-19 RX ORDER — ASPIRIN 325 MG
325 TABLET, DELAYED RELEASE (ENTERIC COATED) ORAL DAILY
Status: DISCONTINUED | OUTPATIENT
Start: 2020-07-20 | End: 2020-07-20

## 2020-07-19 RX ORDER — HYDROCHLOROTHIAZIDE 25 MG/1
25 TABLET ORAL DAILY
Status: DISCONTINUED | OUTPATIENT
Start: 2020-07-20 | End: 2020-07-20

## 2020-07-19 RX ORDER — LORAZEPAM 2 MG/ML
1 INJECTION INTRAMUSCULAR ONCE
Status: COMPLETED | OUTPATIENT
Start: 2020-07-19 | End: 2020-07-19

## 2020-07-19 RX ORDER — ACETAMINOPHEN 500 MG
500 TABLET ORAL EVERY 6 HOURS PRN
Status: DISCONTINUED | OUTPATIENT
Start: 2020-07-19 | End: 2020-07-20

## 2020-07-19 RX ORDER — HYDROCHLOROTHIAZIDE 25 MG/1
25 TABLET ORAL DAILY
Status: DISCONTINUED | OUTPATIENT
Start: 2020-07-19 | End: 2020-07-19

## 2020-07-19 RX ORDER — LOSARTAN POTASSIUM 100 MG/1
TABLET ORAL DAILY
Status: ON HOLD | COMMUNITY
End: 2020-07-22

## 2020-07-19 RX ORDER — CLOPIDOGREL BISULFATE 75 MG/1
75 TABLET ORAL ONCE
Status: COMPLETED | OUTPATIENT
Start: 2020-07-19 | End: 2020-07-19

## 2020-07-19 RX ORDER — ONDANSETRON 2 MG/ML
INJECTION INTRAMUSCULAR; INTRAVENOUS
Status: COMPLETED
Start: 2020-07-19 | End: 2020-07-19

## 2020-07-19 RX ORDER — DIPHENHYDRAMINE HYDROCHLORIDE 50 MG/ML
25 INJECTION INTRAMUSCULAR; INTRAVENOUS ONCE
Status: COMPLETED | OUTPATIENT
Start: 2020-07-19 | End: 2020-07-19

## 2020-07-19 RX ORDER — METOCLOPRAMIDE HYDROCHLORIDE 5 MG/ML
10 INJECTION INTRAMUSCULAR; INTRAVENOUS ONCE
Status: COMPLETED | OUTPATIENT
Start: 2020-07-19 | End: 2020-07-19

## 2020-07-19 RX ORDER — LOSARTAN POTASSIUM 100 MG/1
100 TABLET ORAL DAILY
Status: DISCONTINUED | OUTPATIENT
Start: 2020-07-20 | End: 2020-07-20

## 2020-07-19 RX ORDER — DEXAMETHASONE SODIUM PHOSPHATE 10 MG/ML
10 INJECTION, SOLUTION INTRAMUSCULAR; INTRAVENOUS ONCE
Status: COMPLETED | OUTPATIENT
Start: 2020-07-19 | End: 2020-07-19

## 2020-07-19 RX ORDER — ASPIRIN 81 MG/1
324 TABLET, CHEWABLE ORAL ONCE
Status: COMPLETED | OUTPATIENT
Start: 2020-07-19 | End: 2020-07-19

## 2020-07-19 RX ORDER — POTASSIUM CHLORIDE 750 MG/1
20 TABLET, FILM COATED, EXTENDED RELEASE ORAL DAILY
COMMUNITY
End: 2021-12-13

## 2020-07-19 RX ORDER — ONDANSETRON 2 MG/ML
4 INJECTION INTRAMUSCULAR; INTRAVENOUS ONCE
Status: COMPLETED | OUTPATIENT
Start: 2020-07-19 | End: 2020-07-19

## 2020-07-19 RX ORDER — ASPIRIN 325 MG
325 TABLET, DELAYED RELEASE (ENTERIC COATED) ORAL DAILY
Status: DISCONTINUED | OUTPATIENT
Start: 2020-07-19 | End: 2020-07-19

## 2020-07-19 RX ORDER — ACETAMINOPHEN 500 MG
1000 TABLET ORAL EVERY 6 HOURS PRN
Status: DISCONTINUED | OUTPATIENT
Start: 2020-07-19 | End: 2020-07-20

## 2020-07-19 NOTE — ED PROVIDER NOTES
Patient Seen in: [facility] AND [facility] Emergency Department      History   Patient presents with:  Dizziness  Headache    Stated Complaint: dizziness, headache, nausea/vomiting    HPI    Patient is a 57-year-old female who presents with diffuse headache.   Sh °F (36.6 °C)   Temp src Temporal   SpO2 98 %   O2 Device None (Room air)       Current:/69   Pulse 81   Temp 97.9 °F (36.6 °C) (Temporal)   Resp 20   Ht 152.4 cm (5')   Wt 49.9 kg   LMP 02/01/2016 (Exact Date)   SpO2 97%   BMI 21.48 kg/m²         Phy inferiorly in the left PICA distribution . 2. No brainstem ischemia. No abnormal enhancement. 3. No other significant abnormalities.      Dictated by (CST): Keenan Spence MD on 7/19/2020 at 6:55 PM     Finalized by (CST): Keenan Spence MD on 7 tests and discussion with consultants but not including time spent performing procedures.               Disposition and Plan     Clinical Impression:  Cerebrovascular accident (CVA), unspecified mechanism ([facility] [address]  (primary encounter diagnosis)  Internal carot

## 2020-07-19 NOTE — ED INITIAL ASSESSMENT (HPI)
Pt was seen at a hospital in [facility] on Monday for vomiting, head ache and dizziness, pt was diagnosed with vertigo and low potassium, pt states her headache got better but today she states her headache is worse than Monday.

## 2020-07-20 ENCOUNTER — APPOINTMENT (OUTPATIENT)
Dept: CV DIAGNOSTICS | Facility: HOSPITAL | Age: 55
DRG: 064 | End: 2020-07-20
Attending: HOSPITALIST
Payer: COMMERCIAL

## 2020-07-20 LAB
CHOLEST SMN-MCNC: 164 MG/DL (ref ?–200)
EST. AVERAGE GLUCOSE BLD GHB EST-MCNC: 117 MG/DL (ref 68–126)
GLUCOSE BLDC GLUCOMTR-MCNC: 109 MG/DL (ref 70–99)
GLUCOSE BLDC GLUCOMTR-MCNC: 88 MG/DL (ref 70–99)
HBA1C MFR BLD HPLC: 5.7 % (ref ?–5.7)
HDLC SERPL-MCNC: 68 MG/DL (ref 40–59)
LDLC SERPL CALC-MCNC: 76 MG/DL (ref ?–100)
NONHDLC SERPL-MCNC: 96 MG/DL (ref ?–130)
TRIGL SERPL-MCNC: 100 MG/DL (ref 30–149)
TSI SER-ACNC: 3.14 MIU/ML (ref 0.36–3.74)
VIT B12 SERPL-MCNC: 567 PG/ML (ref 193–986)
VLDLC SERPL CALC-MCNC: 20 MG/DL (ref 0–30)

## 2020-07-20 PROCEDURE — 93306 TTE W/DOPPLER COMPLETE: CPT | Performed by: HOSPITALIST

## 2020-07-20 PROCEDURE — 99254 IP/OBS CNSLTJ NEW/EST MOD 60: CPT | Performed by: OTHER

## 2020-07-20 RX ORDER — DIAZEPAM 5 MG/1
5 TABLET ORAL ONCE
Status: COMPLETED | OUTPATIENT
Start: 2020-07-20 | End: 2020-07-20

## 2020-07-20 RX ORDER — ENOXAPARIN SODIUM 100 MG/ML
40 INJECTION SUBCUTANEOUS NIGHTLY
Status: DISCONTINUED | OUTPATIENT
Start: 2020-07-20 | End: 2020-07-22

## 2020-07-20 RX ORDER — ATORVASTATIN CALCIUM 40 MG/1
40 TABLET, FILM COATED ORAL NIGHTLY
Status: DISCONTINUED | OUTPATIENT
Start: 2020-07-20 | End: 2020-07-22

## 2020-07-20 RX ORDER — KETOROLAC TROMETHAMINE 30 MG/ML
30 INJECTION, SOLUTION INTRAMUSCULAR; INTRAVENOUS ONCE
Status: COMPLETED | OUTPATIENT
Start: 2020-07-20 | End: 2020-07-20

## 2020-07-20 RX ORDER — AMLODIPINE BESYLATE 2.5 MG/1
2.5 TABLET ORAL DAILY
Status: DISCONTINUED | OUTPATIENT
Start: 2020-07-20 | End: 2020-07-22

## 2020-07-20 RX ORDER — ACETAMINOPHEN 500 MG
1000 TABLET ORAL EVERY 6 HOURS PRN
Status: DISCONTINUED | OUTPATIENT
Start: 2020-07-20 | End: 2020-07-22

## 2020-07-20 RX ORDER — CLOPIDOGREL BISULFATE 75 MG/1
75 TABLET ORAL DAILY
Status: DISCONTINUED | OUTPATIENT
Start: 2020-07-20 | End: 2020-07-22

## 2020-07-20 RX ORDER — METOCLOPRAMIDE HYDROCHLORIDE 5 MG/ML
10 INJECTION INTRAMUSCULAR; INTRAVENOUS EVERY 6 HOURS PRN
Status: DISCONTINUED | OUTPATIENT
Start: 2020-07-20 | End: 2020-07-22

## 2020-07-20 RX ORDER — MECLIZINE HYDROCHLORIDE 25 MG/1
25 TABLET ORAL 3 TIMES DAILY PRN
Status: DISCONTINUED | OUTPATIENT
Start: 2020-07-20 | End: 2020-07-22

## 2020-07-20 RX ORDER — METOCLOPRAMIDE HYDROCHLORIDE 5 MG/ML
10 INJECTION INTRAMUSCULAR; INTRAVENOUS ONCE
Status: COMPLETED | OUTPATIENT
Start: 2020-07-20 | End: 2020-07-20

## 2020-07-20 RX ORDER — ASPIRIN 81 MG/1
81 TABLET, CHEWABLE ORAL DAILY
Status: DISCONTINUED | OUTPATIENT
Start: 2020-07-21 | End: 2020-07-22

## 2020-07-20 RX ORDER — ACETAMINOPHEN 500 MG
500 TABLET ORAL EVERY 6 HOURS PRN
Status: DISCONTINUED | OUTPATIENT
Start: 2020-07-20 | End: 2020-07-22

## 2020-07-20 RX ORDER — MAGNESIUM SULFATE HEPTAHYDRATE 40 MG/ML
2 INJECTION, SOLUTION INTRAVENOUS ONCE
Status: COMPLETED | OUTPATIENT
Start: 2020-07-20 | End: 2020-07-20

## 2020-07-20 RX ORDER — TRAMADOL HYDROCHLORIDE 50 MG/1
50 TABLET ORAL EVERY 6 HOURS PRN
Status: DISCONTINUED | OUTPATIENT
Start: 2020-07-20 | End: 2020-07-22
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
## 2020-07-20 NOTE — H&P
[facility] HOSP - [facility]    History and Physical    Tika Gaffney Patient Status:  Emergency    2/15/1965 MRN [id number]   Location [address] Attending Yane Carey MD   Hosp Day # 0 PCP Dasia Space     Date:   Alcohol/week: 0.0 standard drinks      Comment: RARELY    Drug use: No    Allergies/Medications:    Allergies:   Aspirin                   Sulfa Antibiotics           Comment:Other reaction(s): Rash  (Not in a hospital admission)      Review of Systems: time. No cranial nerve deficit, sensory deficit or motor deficit. Skin: Skin is warm. No rash noted. Psychiatric: She has a normal mood and affect.  Her behavior is normal. Judgment and thought content normal.     Cervical Papanicolaou to be done in MD' margin left cerebellar hemisphere.   CTA carotids:  S/P Dexamethasone  Monitor neuro signs     HTN  Stable  Monitor PB  Home meds: Diovan      Hx of HCV  Stable, off treatment  Was followed at [facility]    Rapid SARS-COV-2 neg on 7/19  AC: ASA, Plavix  GI Px:   Nolon Kail

## 2020-07-20 NOTE — CONSULTS
[facility]    Report of Consultation    Domingo Both Patient Status:  Inpatient    2/15/1965 MRN [id number]   Location [facility] 3W/SW Attending Janene Vallejo MD   Hosp Day # 1 PCP Malik Garcia     Date of Admission:   Current Medications:  Enoxaparin Sodium (LOVENOX) 40 MG/0.4ML injection 40 mg, 40 mg, Subcutaneous, Daily  aspirin chewable tab 81 mg, 81 mg, Oral, Daily  Clopidogrel Bisulfate (PLAVIX) tab 75 mg, 75 mg, Oral, Daily  sodium chloride 0.9% IV bolus 1,000 Oral Daily   • Clopidogrel Bisulfate  75 mg Oral Daily   • sodium chloride 0.9%  1,000 mL Intravenous Once   • Metoclopramide HCl  10 mg Intravenous Once   • magnesium sulfate  2 g Intravenous Once   • valproate  1,000 mg Intravenous Once   • Potassium Chl region of subacute infarct involving the inferior margin left cerebellar hemisphere. 2. 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 3. Normal right vertebral artery and right PICA. 4. Normal APRIL and MCA circulation.

## 2020-07-20 NOTE — PROGRESS NOTES
[facility] - [facility]    Progress Note    Jeaneth Mendez Patient Status:  Inpatient    2/15/1965 MRN [id number]   Location [facility] 3W/SW Attending Ty Yoo MD   Hosp Day # 1 PCP [facility]        Subjective:   Subjective: ischemia. No abnormal enhancement. 3. No other significant abnormalities.      Dictated by (CST): Daria Amor MD on 7/19/2020 at 6:55 PM     Finalized by (CST): Daria Amor MD on 7/19/2020 at 7:15 PM          Cta Brain ([id number]=[id number])    Resul FOR AGE When compared with ECG of 09/06/2019 17:11:59 No significant changes have occurred Electronically signed on 07/19/2020 at 17:07 by Benji Paez MD        Assessment and Plan:    61 y/o female with PMH migraines and ITP since childhood admitted 7/

## 2020-07-20 NOTE — SLP NOTE
SPEECH/LANGUAGE/COGNITIVE EVALUATION - INPATIENT    Admission Date: 7/19/2020  Evaluation Date: 07/20/20    Reason for Referral: Stroke protocol    ASSESSMENT & PLAN   ASSESSMENT & IMPRESSION  PPE REQUIRED. THIS SLP WORE GLOVES, AND DROPLET MASK.  HANDS CASSIDY warranted. In Progress     Prior Living Situation: Home with spouse  Prior Level of Function: Independent      Imaging Results: MRI 7/19/20:  1. Moderate subacute infarct left cerebellum inferiorly in the left PICA distribution . 2.  No brainstem ischem

## 2020-07-20 NOTE — CONSULTS
Neurology Inpatient Consult Note    Miguel Angel Prado : 2/15/1965   Referring Physician: Dr. Arina Preston  HPI:     Miguel Angel Prado is a 60year old female who is being seen in neurologic evaluation. Patient came into the ED yesterday.   She came in with Concerns:        Caffeine Concern: No        ROS:   GENERAL: no fevers, no chills  SKIN: no new lesions or rashes  HEENT: See HPI  RESPIRATORY: no shortness of breath, no wheezing  CARDIOVASCULAR: no chest pain, no palpitations  GI: See HPI  GENITAL/: region of subacute infarct involving the inferior margin left cerebellar hemisphere. 2. 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 3. Normal right vertebral artery and right PICA.   4. Normal APRIL and MCA circulati PRN      Neurology service will continue to follow. Please page with any questions / concerns. Yves Dangelo MD  [facility]  [phone]      For our mutual protection, during entire interaction, mask, gloves were worn by myself.

## 2020-07-20 NOTE — PLAN OF CARE
Problem: CARDIOVASCULAR - ADULT  Goal: Maintains optimal cardiac output and hemodynamic stability  Description  INTERVENTIONS:  - Monitor vital signs, rhythm, and trends  - Monitor for bleeding, hypotension and signs of decreased cardiac output  - Evaluate frequently for physical needs  - Identify cognitive and physical deficits and behaviors that affect risk of falls.   - [place] fall precautions as indicated by assessment.  - Educate pt/family on patient safety including physical limitations  - Instruct p

## 2020-07-20 NOTE — PAYOR COMM NOTE
--------------  ADMISSION REVIEW     Payor: KELI DAVIES  Subscriber #:  [id number]  Authorization Number:  [id number]     Admit date: 7/19/20  Admit time: 2218       Patient Seen in: [facility] Emergency Department    History   Patient presents with: vertigo, hemorrhage  Admission disposition: 7/19/2020  9:59 PM     Mri Brain (w+wo) (itn=[id number])  Result Date: 7/19/2020  CONCLUSION:  1. Moderate subacute infarct left cerebellum inferiorly in the left PICA distribution . 2. No brainstem ischemia.   No abno as well. She states the headache has continued since then. She has been taking tramadol but she states tonight it came on much more suddenly. Positive vomiting and sinus congestion. Denies fevers or neck pain. No trauma. No chest pain.  No COVID expos present. Pulmonary/Chest: Effort normal and breath sounds normal. No respiratory distress. She has no rales. Abdominal: Soft. Bowel sounds are normal. She exhibits no distension. There is no tenderness.    Neurological: She is alert and oriented to [facility] evaluation. She also states that she feels little bit unsteady.   She does have a history of migraine headaches and perhaps some sinus pain.     EXAM:      Vitals:  /90 (BP Location: Right arm)   Pulse 77   Temp 97.9 °F (36.6 °C) (Oral)   Resp 18   H circulation.  Normal intact [place] of [place].         CTA carotids , images and report reviewed  CONCLUSION:   1. 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

## 2020-07-20 NOTE — PLAN OF CARE
Still c/o lightheadedness at rest and with movement. C/o headache. NIH- 0. MDs at bedside. UP with CGA. PT/OT/ ST consult. 2d echo ordered. Neuro checks q4 hours. Stroke education discussed with patient. Fall prec in place. Will continue to monitor.     Pr perfusion - ex.  Angina  - Evaluate fluid balance, assess for edema, trend weights  Outcome: Progressing  Goal: Absence of cardiac arrhythmias or at baseline  Description  INTERVENTIONS:  - Continuous cardiac monitoring, monitor vital signs, obtain 12 lead with strengthening/mobility  - Encourage toileting schedule  Outcome: Progressing

## 2020-07-20 NOTE — SLP NOTE
ADULT SWALLOWING EVALUATION    ASSESSMENT    ASSESSMENT/OVERALL IMPRESSION:  PPE REQUIRED. THIS SLP WORE  GLOVES, AND DROPLET MASK. HANDS SANITIZED/WASHED UPON ENTRANCE/EXIT. Pt presents with a functional swallow.   Oral phase was within normal limits w complaining of severe headache. OBJECTIVE   ORAL MOTOR EXAMINATION  Dentition: Natural;Functional  Symmetry: Within Functional Limits  Strength:  Within Functional Limits  Tone: Within Functional Limits  Range of Motion: Within Functional Limits  Rate of

## 2020-07-20 NOTE — ED NOTES
Orders for admission, patient is aware of plan and ready to go upstairs. Any questions, please call ED RN Ajay  at extension [phone]. Pt is drowsy but arousable.  at bedside. Pt is a/o x 4.

## 2020-07-21 LAB
DEPRECATED RDW RBC AUTO: 46.3 FL (ref 35.1–46.3)
ERYTHROCYTE [DISTWIDTH] IN BLOOD BY AUTOMATED COUNT: 14 % (ref 11–15)
GLUCOSE BLDC GLUCOMTR-MCNC: 102 MG/DL (ref 70–99)
GLUCOSE BLDC GLUCOMTR-MCNC: 81 MG/DL (ref 70–99)
GLUCOSE BLDC GLUCOMTR-MCNC: 82 MG/DL (ref 70–99)
GLUCOSE BLDC GLUCOMTR-MCNC: 97 MG/DL (ref 70–99)
HCT VFR BLD AUTO: 37.4 % (ref 35–48)
HGB BLD-MCNC: 12.7 G/DL (ref 12–16)
MCH RBC QN AUTO: 30.8 PG (ref 26–34)
MCHC RBC AUTO-ENTMCNC: 34 G/DL (ref 31–37)
MCV RBC AUTO: 90.8 FL (ref 80–100)
PLATELET # BLD AUTO: 270 10(3)UL (ref 150–450)
RBC # BLD AUTO: 4.12 X10(6)UL (ref 3.8–5.3)
WBC # BLD AUTO: 12.7 X10(3) UL (ref 4–11)

## 2020-07-21 PROCEDURE — 99232 SBSQ HOSP IP/OBS MODERATE 35: CPT | Performed by: OTHER

## 2020-07-21 NOTE — PAYOR COMM NOTE
--------------  CONTINUED STAY REVIEW    Payor: KELI DAVIES  Subscriber #:  [id number]  Authorization Number:  [id number]     Admit date: 7/19/20  Admit time: 2218    FAXING CLINICAL UPDATE FOR 7/21/20 7/21/20  Subjective:  Up to chair, ambulating well, vasculitis VS marked vasospasm - corresponding to region of subacute infarct involving the inferior margin left cerebellar hemisphere.   -ASA/plavix started  -LDL 76; statin started   -B12, TSH ok  -PT/OT/ST consulted: no needs  -echo: EF 55%, grade 1 DD, n MG/0.4ML injection 40 mg     Date Action Dose Route User    7/20/2020 1952 Given 40 mg Subcutaneous (Left Upper Arm) Mellisa Mahoney RN      ketorolac tromethamine (TORADOL) 30 MG/ML injection 30 mg     Date Action Dose Route User    7/20/2020 2233 Given 3

## 2020-07-21 NOTE — CONSULTS
[facility]    Report of Consultation    Tash Matt Patient Status:  Inpatient    2/15/1965 MRN [id number]   Location [facility] 3W/SW Attending Candelario Salmeron MD   Hosp Day # 2 PCP Josette Stinson     Date of Admission:   and Plavix.     Past Medical History  Past Medical History:   Diagnosis Date   • Essential hypertension    • Hepatitis C     resolved   • ITP (idiopathic thrombocytopenic purpura)    • Osteopenia        Past Surgical History  Past Surgical History:   Proced Take 1 tablet by mouth daily. Sylvester Cit-Mag-D3-Zn--Man-Bor 250- mg tablet  losartan 100 MG Oral Tab, Take by mouth daily. Potassium Chloride ER 10 MEQ Oral Tab CR, Take 20 mEq by mouth daily.   hydrochlorothiazide 25 MG Oral Tab, Take 25 mg by mouth range of motion no appreciable adenopathy mass or JVD. Carotids are 2+ without bruits. Lungs are clear to auscultation and percussion. Cardiac exam there is a normal rate and rhythm with a normal S1,S2 and no pathological murmurs.   No rub or extra heart inferior margin left cerebellar hemisphere. 2. 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 3. Normal right vertebral artery and right PICA. 4. Normal APRIL and MCA circulation. Normal intact [place].     Dict interventionalists and a plan is made for follow-up or treatment of her significant fibromuscular dysplasia. I discussed my recommendations with the patient, answered her questions, and she understands.     Thank you for allowing me to participate in the

## 2020-07-21 NOTE — PROGRESS NOTES
[place] [facility] - [facility]    Progress Note    Osmin Masker Patient Status:  Inpatient    2/15/1965 MRN [id number]   Location [facility] 3W/SW Attending Yoel Oliva MD   Hosp Day # 2 PCP [facility]        Subjective:   Subjective: cerebellum inferiorly in the left PICA distribution . 2. No brainstem ischemia. No abnormal enhancement. 3. No other significant abnormalities.      Dictated by (CST): Teena Castillo MD on 7/19/2020 at 6:55 PM     Finalized by (CST): Fidel Christy T-waves -Probably normal -consider anteroseptal ischemia.  PROBABLY NORMAL FOR AGE When compared with ECG of 09/06/2019 17:11:59 No significant changes have occurred Electronically signed on 07/19/2020 at 17:07 by Kelli Yates MD        Assessment and Vian[place]

## 2020-07-21 NOTE — OCCUPATIONAL THERAPY NOTE
OCCUPATIONAL THERAPY EVALUATION - INPATIENT     Room Number: 608/589-X  Evaluation Date: 7/21/2020  Type of Evaluation: Initial  Presenting Problem: HA    Physician Order: IP Consult to Occupational Therapy  Reason for Therapy: ADL/IADL Dysfunction and Dis accident (CVA), unspecified mechanism ([facility] [address]  Active Problems:    Cerebrovascular accident (CVA) ([facility] [address]    Internal carotid artery dissection [facility])      Past Medical History  Past Medical History:   Diagnosis Date   • Essential hypertension    • Hepatitis C regular lower body clothing?: None  -   Bathing (including washing, rinsing, drying)?: None  -   Toileting, which includes using toilet, bedpan or urinal? : None  -   Putting on and taking off regular upper body clothing?: None  -   Taking care of personal independent

## 2020-07-21 NOTE — SLP NOTE
SPEECH DAILY NOTE - INPATIENT    ASSESSMENT & PLAN   ASSESSMENT  PPE REQUIRED. THIS SLP WORE GLOVES AND DROPLET MASK. HANDS SANITIZED/WASHED UPON ENTRANCE/EXIT. Patient participated in ongoing S/L cog tx.  SLP reviewed WRAP strategies and trained across visuospatial reasoning skills with 90% accuracy. Visual Sequencing 4 steps with 90% accuracy. 5-6 Step sequences left at bedside for practice   Crossword puzzle with 90% accuracy   Complex puzzles left at bedside.    Visual cancellation tasks with 90% acc

## 2020-07-21 NOTE — PROGRESS NOTES
Neurology Inpatient Follow-up Note      HPI:     Patient being seen in follow up. Interval notes and workup reviewed. Headache significantly improved.       Past Medical Hisotory:  Reviewed    Medications:  Reviewed    Allergies:    Sulfa Antibiotics Mild regurgitation. 3. Atrial septum: There was no left-to-right atrial level shunt, at     baseline or with provocation. No previous study was available for comparison.     ASSESSMENT/PLAN:   Left PICA distribution ischemic stroke  Right ICA focal dissec

## 2020-07-21 NOTE — PHYSICAL THERAPY NOTE
PHYSICAL THERAPY EVALUATION - INPATIENT     Room Number: 706/455-C  Evaluation Date: 7/21/2020  Type of Evaluation: Initial   Physician Order: PT Eval and Treat    Presenting Problem: CVA  Reason for Therapy: Mobility Dysfunction and Discharge Planning admission.     PHYSICAL THERAPY MEDICAL/SOCIAL HISTORY     Problem List  Principal Problem:    Cerebrovascular accident (CVA), unspecified mechanism ([facility] [address]  Active Problems:    Cerebrovascular accident (CVA) [facility])    Internal carotid artery dissection ([facility] [address] difficulty does the patient currently have. ..  -   Turning over in bed (including adjusting bedclothes, sheets and blankets)?: None   -   Sitting down on and standing up from a chair with arms (e.g., wheelchair, bedside commode, etc.): None   -   Moving fr

## 2020-07-21 NOTE — PROGRESS NOTES
[facility]    Cardiology progress note    Domingo Both Patient Status:  Inpatient    2/15/1965 MRN [id number]   Location [facility] 3W/SW Attending Janene [facility] MD   Hosp Day # 2 PCP Malik Garcia     Date of Admission:  7 Intake 2130 ml   Output 400 ml   Net 1730 ml       General appearance:  distracted  Head: Normocephalic, without obvious abnormality, atraumatic  Pulmonary: clear to auscultation bilaterally  Cardiovascular: S1, S2 normal, no murmur, click, rub or gallop Normal intact [place] of [place]. Dictated by (CST): Clifton Au MD on 7/19/2020 at 9:18 PM     Finalized by (CST): Clifton Au MD on 7/19/2020 at 9:40 PM          Cta Carotid Arteries (cpt=70498)    Result Date: 7/20/2020  CONCLUSION:  1.

## 2020-07-21 NOTE — SLP NOTE
SPEECH DAILY NOTE - INPATIENT    ASSESSMENT & PLAN   ASSESSMENT  PPE REQUIRED. THIS SLP WORE GLOVES AND DROPLET MASK. HANDS SANITIZED/WASHED UPON ENTRANCE/EXIT. SLP f/u for ongoing meal assessment per recommendations of 7/20/2020 BSE.  RN reports pt meghan liquids with no overt CSA for 100% of AM meal.   Goal Met    Goal #2 The patient/family/caregiver will demonstrate understanding and implementation of aspiration precautions and swallow strategies independently over 1 session(s).   Reviewed with RN and rebekah

## 2020-07-22 VITALS
DIASTOLIC BLOOD PRESSURE: 93 MMHG | WEIGHT: 117.31 LBS | OXYGEN SATURATION: 97 % | BODY MASS INDEX: 23.03 KG/M2 | SYSTOLIC BLOOD PRESSURE: 144 MMHG | TEMPERATURE: 98 F | HEART RATE: 72 BPM | HEIGHT: 60 IN | RESPIRATION RATE: 16 BRPM

## 2020-07-22 LAB — GLUCOSE BLDC GLUCOMTR-MCNC: 79 MG/DL (ref 70–99)

## 2020-07-22 RX ORDER — ATORVASTATIN CALCIUM 40 MG/1
40 TABLET, FILM COATED ORAL NIGHTLY
Qty: 30 TABLET | Refills: 1 | Status: SHIPPED | OUTPATIENT
Start: 2020-07-22

## 2020-07-22 RX ORDER — AMLODIPINE BESYLATE 2.5 MG/1
2.5 TABLET ORAL DAILY
Qty: 30 TABLET | Refills: 1 | Status: SHIPPED | OUTPATIENT
Start: 2020-07-23 | End: 2021-12-13 | Stop reason: ALTCHOICE

## 2020-07-22 RX ORDER — ASPIRIN 81 MG/1
81 TABLET, CHEWABLE ORAL DAILY
Refills: 0 | Status: SHIPPED | COMMUNITY
Start: 2020-07-22 | End: 2020-07-22

## 2020-07-22 RX ORDER — CLOPIDOGREL BISULFATE 75 MG/1
75 TABLET ORAL DAILY
Qty: 30 TABLET | Refills: 1 | Status: SHIPPED | OUTPATIENT
Start: 2020-07-23 | End: 2021-12-13 | Stop reason: ALTCHOICE

## 2020-07-22 RX ORDER — BUTALBITAL, ACETAMINOPHEN AND CAFFEINE 50; 325; 40 MG/1; MG/1; MG/1
1 TABLET ORAL EVERY 4 HOURS PRN
Qty: 30 TABLET | Refills: 0 | Status: SHIPPED | OUTPATIENT
Start: 2020-07-22 | End: 2021-09-28

## 2020-07-22 RX ORDER — DIAZEPAM 5 MG/1
5 TABLET ORAL ONCE
Status: COMPLETED | OUTPATIENT
Start: 2020-07-22 | End: 2020-07-22

## 2020-07-22 RX ORDER — KETOROLAC TROMETHAMINE 30 MG/ML
30 INJECTION, SOLUTION INTRAMUSCULAR; INTRAVENOUS ONCE
Status: COMPLETED | OUTPATIENT
Start: 2020-07-22 | End: 2020-07-22

## 2020-07-22 RX ORDER — ACETAMINOPHEN 500 MG
500 TABLET ORAL EVERY 6 HOURS PRN
Refills: 0 | Status: SHIPPED | COMMUNITY
Start: 2020-07-22

## 2020-07-22 RX ORDER — ASPIRIN 81 MG/1
81 TABLET, CHEWABLE ORAL DAILY
Qty: 30 TABLET | Refills: 1 | Status: SHIPPED | OUTPATIENT
Start: 2020-07-22

## 2020-07-22 NOTE — DISCHARGE SUMMARY
[place] [facility] - [facility]    Discharge Summary    Katie Parker Patient Status:  Inpatient    2/15/1965 MRN [id number]   Location [facility] 3W/SW Attending Olive Melgar MD   Hosp Day # 3 FAROOQ Ceballos     Date of Admission: 2020 consulted  -CVS consulted today: no surgical intervention, recommends f/u with Neuro Intervention at [address]..Reveiwed films and recommend f/u with stroke neurology      *Headache  Hx migraine  S/p IV dexamethasone, benadryl, 1L saline bolus, magne Take 1 tablet (500 mg total) by mouth every 6 (six) hours as needed. Refills:  0     amLODIPine Besylate 2.5 MG Tabs  Commonly known as:  NORVASC  Start taking on:  July 23, 2020      Take 1 tablet (2.5 mg total) by mouth daily.    Quantity:  30 tablet Follow-up Information       Follow-up With  Details  Why  Nataliya Villa MD  In 1 week    [address] [place] [address] Emmanuel Glaser MD  Schedule an appointment as soon as possible for a visit in

## 2020-07-22 NOTE — PLAN OF CARE
Problem: Patient/Family Goals  Goal: Patient/Family Long Term Goal  Description  Patient's Long Term Goal: Stroke recovery    Interventions:  - Medications as ordered  - MD follow up  - Neuro checks     - See additional Care Plan goals for specific inter if indicated  - Evaluate effectiveness of antiarrhythmic and heart rate control medications as ordered  - Initiate emergency measures for life threatening arrhythmias  - Monitor electrolytes and administer replacement therapy as ordered  Outcome: Progressi assistance. Will continue to monitor pt.

## 2020-07-22 NOTE — PLAN OF CARE
Patient was provided with discharge instructions, education, and follow up information. Printed prescription for Fioricet was given to patient. All other prescriptions were faxed to patient's pharmacy on file.  Patient verbalizes understanding of follow up signs, rhythm, and trends  - Monitor for bleeding, hypotension and signs of decreased cardiac output  - Evaluate effectiveness of vasoactive medications to optimize hemodynamic stability  - Monitor arterial and/or venous puncture sites for bleeding and/or needs  - Identify cognitive and physical deficits and behaviors that affect risk of falls.   - [place] fall precautions as indicated by assessment.  - Educate pt/family on patient safety including physical limitations  - Instruct pt to call for assistance

## 2020-07-22 NOTE — PROGRESS NOTES
[facility]    Progress Note    Katie Parker Patient Status:  Inpatient    2/15/1965 MRN [id number]   Location [facility] 3W/SW Attending Olive Melgar MD   Hosp Day # 3 [facility]CP [facility]       Subjective:   Katie Parker 06/11/2018    CRP 0.5 06/11/2018    CK 97 06/11/2018    B12 567 07/19/2020       Current Medications:  Enoxaparin Sodium (LOVENOX) 40 MG/0.4ML injection 40 mg, 40 mg, Subcutaneous, Nightly  aspirin chewable tab 81 mg, 81 mg, Oral, Daily  Clopidogrel Bisulf with limited distal visualization corresponding to region of subacute infarct involving the inferior margin left cerebellar hemisphere. 2. 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  3. Normal right vertebral artery available for comparison. Assessment and Plan:   1. Cerebrovascular accident (CVA), unspecified mechanism ([address]   -  on DAPT ASA 81 and Plavix 75 with possible vasculitis, Lipitor 40 mg,  neuro on board, we will follow rec      2.   Internal caroti

## 2020-07-24 NOTE — PAYOR COMM NOTE
Payor:  KELI DAVIES  Subscriber #:  [id number]  Authorization Number:  [id number]     Admit date: 7/19/20  Admit time:  2218  Discharge Date: 7/22/2020

## 2020-08-04 ENCOUNTER — LAB ENCOUNTER (OUTPATIENT)
Dept: LAB | Facility: HOSPITAL | Age: 55
End: 2020-08-04
Attending: INTERNAL MEDICINE
Payer: COMMERCIAL

## 2020-08-04 DIAGNOSIS — I10 HTN (HYPERTENSION): Primary | ICD-10-CM

## 2020-08-04 LAB
ANION GAP SERPL CALC-SCNC: 5 MMOL/L (ref 0–18)
BUN BLD-MCNC: 24 MG/DL (ref 7–18)
BUN/CREAT SERPL: 29.3 (ref 10–20)
CALCIUM BLD-MCNC: 9 MG/DL (ref 8.5–10.1)
CHLORIDE SERPL-SCNC: 106 MMOL/L (ref 98–112)
CO2 SERPL-SCNC: 29 MMOL/L (ref 21–32)
CREAT BLD-MCNC: 0.82 MG/DL (ref 0.55–1.02)
GLUCOSE BLD-MCNC: 68 MG/DL (ref 70–99)
OSMOLALITY SERPL CALC.SUM OF ELEC: 292 MOSM/KG (ref 275–295)
PATIENT FASTING Y/N/NP: NO
POTASSIUM SERPL-SCNC: 3.6 MMOL/L (ref 3.5–5.1)
SODIUM SERPL-SCNC: 140 MMOL/L (ref 136–145)

## 2020-08-04 PROCEDURE — 80048 BASIC METABOLIC PNL TOTAL CA: CPT

## 2020-08-04 PROCEDURE — 36415 COLL VENOUS BLD VENIPUNCTURE: CPT

## 2020-09-17 ENCOUNTER — OFFICE VISIT (OUTPATIENT)
Dept: OBGYN CLINIC | Facility: CLINIC | Age: 55
End: 2020-09-17
Payer: COMMERCIAL

## 2020-09-17 VITALS
DIASTOLIC BLOOD PRESSURE: 86 MMHG | BODY MASS INDEX: 22 KG/M2 | HEART RATE: 95 BPM | SYSTOLIC BLOOD PRESSURE: 143 MMHG | WEIGHT: 114 LBS

## 2020-09-17 DIAGNOSIS — N84.1 CERVICAL POLYP: ICD-10-CM

## 2020-09-17 DIAGNOSIS — Z01.419 ENCOUNTER FOR GYNECOLOGICAL EXAMINATION WITHOUT ABNORMAL FINDING: Primary | ICD-10-CM

## 2020-09-17 DIAGNOSIS — Z12.4 SCREENING FOR MALIGNANT NEOPLASM OF CERVIX: ICD-10-CM

## 2020-09-17 DIAGNOSIS — Z12.31 SCREENING MAMMOGRAM, ENCOUNTER FOR: ICD-10-CM

## 2020-09-17 PROCEDURE — 3079F DIAST BP 80-89 MM HG: CPT | Performed by: OBSTETRICS & GYNECOLOGY

## 2020-09-17 PROCEDURE — 99396 PREV VISIT EST AGE 40-64: CPT | Performed by: OBSTETRICS & GYNECOLOGY

## 2020-09-17 PROCEDURE — 3077F SYST BP >= 140 MM HG: CPT | Performed by: OBSTETRICS & GYNECOLOGY

## 2020-09-17 RX ORDER — LOSARTAN POTASSIUM 25 MG/1
TABLET ORAL DAILY
COMMUNITY

## 2020-09-18 LAB — HPV I/H RISK 1 DNA SPEC QL NAA+PROBE: NEGATIVE

## 2020-09-18 NOTE — PROGRESS NOTES
HPI:    Patient ID: Naren Yepez is a 60year old female. HPI  [address] and [facility]. Significant new issue with hemorrhagic CVA in July. She was in [facility], seen and imaging not available for diagnosis.  She had another episode at home and seen (Patient not taking: Reported on 9/24/2020 ) 30 tablet 1   • Butalbital-APAP-Caffeine -40 MG Oral Tab Take 1 tablet by mouth every 4 (four) hours as needed for Headaches.  30 tablet 0   • Potassium Chloride ER 10 MEQ Oral Tab CR Take 20 mEq by mouth d scheduled polypectomy next week at [address] [place] [address] office.    Orders Placed This Encounter      Hpv Dna  High Risk , Thin Prep Collect      ThinPrep PAP Smear      THINPREP PAP SMEAR ONLY      Meds This Visit:  Requested Prescriptions      No prescriptions requeste

## 2020-09-22 NOTE — PROGRESS NOTES
Nova Khoury. You have negative pap and HPV test. Continue yearly exam and do pap in 3 years. Patient not active on My Chart. Please send letter.

## 2020-09-24 ENCOUNTER — OFFICE VISIT (OUTPATIENT)
Dept: OBGYN CLINIC | Facility: CLINIC | Age: 55
End: 2020-09-24
Payer: COMMERCIAL

## 2020-09-24 VITALS
WEIGHT: 114 LBS | BODY MASS INDEX: 22 KG/M2 | HEART RATE: 80 BPM | DIASTOLIC BLOOD PRESSURE: 79 MMHG | SYSTOLIC BLOOD PRESSURE: 115 MMHG

## 2020-09-24 DIAGNOSIS — N84.1 MUCOUS POLYP OF CERVIX: ICD-10-CM

## 2020-09-24 DIAGNOSIS — N84.1 CERVICAL POLYP: Primary | ICD-10-CM

## 2020-09-24 PROCEDURE — 3078F DIAST BP <80 MM HG: CPT | Performed by: OBSTETRICS & GYNECOLOGY

## 2020-09-24 PROCEDURE — 3074F SYST BP LT 130 MM HG: CPT | Performed by: OBSTETRICS & GYNECOLOGY

## 2020-09-24 PROCEDURE — 57500 BIOPSY OF CERVIX: CPT | Performed by: OBSTETRICS & GYNECOLOGY

## 2020-09-24 NOTE — PROGRESS NOTES
Polypectomy     Pregnancy Results: n/a    Birth control method(s) used:   Consent signed. Procedure discussed with the patient in detail including indication, risks, benefits, alternatives and complications.     Pelvic Exam Findings:  Cervix: polyp 1.5 cm

## 2020-09-30 ENCOUNTER — HOSPITAL ENCOUNTER (OUTPATIENT)
Dept: MAMMOGRAPHY | Age: 55
Discharge: HOME | End: 2020-09-30
Attending: OBSTETRICS & GYNECOLOGY
Payer: COMMERCIAL

## 2020-09-30 DIAGNOSIS — Z12.31 SCREENING MAMMOGRAM, ENCOUNTER FOR: ICD-10-CM

## 2020-09-30 PROCEDURE — 77067 SCR MAMMO BI INCL CAD: CPT | Performed by: OBSTETRICS & GYNECOLOGY

## 2020-09-30 PROCEDURE — 77063 BREAST TOMOSYNTHESIS BI: CPT | Performed by: OBSTETRICS & GYNECOLOGY

## 2020-11-05 ENCOUNTER — HOSPITAL ENCOUNTER (OUTPATIENT)
Dept: GENERAL RADIOLOGY | Age: 55
Discharge: HOME | End: 2020-11-05
Attending: INTERNAL MEDICINE
Payer: COMMERCIAL

## 2020-11-05 ENCOUNTER — LAB ENCOUNTER (OUTPATIENT)
Dept: LAB | Age: 55
End: 2020-11-05
Attending: INTERNAL MEDICINE
Payer: COMMERCIAL

## 2020-11-05 ENCOUNTER — OFFICE VISIT (OUTPATIENT)
Dept: RHEUMATOLOGY | Facility: CLINIC | Age: 55
End: 2020-11-05
Payer: COMMERCIAL

## 2020-11-05 VITALS
SYSTOLIC BLOOD PRESSURE: 126 MMHG | HEIGHT: 60 IN | DIASTOLIC BLOOD PRESSURE: 77 MMHG | HEART RATE: 90 BPM | BODY MASS INDEX: 22.78 KG/M2 | WEIGHT: 116 LBS

## 2020-11-05 DIAGNOSIS — G89.29 CHRONIC BILATERAL LOW BACK PAIN WITHOUT SCIATICA: ICD-10-CM

## 2020-11-05 DIAGNOSIS — M54.50 CHRONIC BILATERAL LOW BACK PAIN WITHOUT SCIATICA: ICD-10-CM

## 2020-11-05 DIAGNOSIS — M25.50 POLYARTHRALGIA: ICD-10-CM

## 2020-11-05 DIAGNOSIS — M79.10 MYALGIA: ICD-10-CM

## 2020-11-05 DIAGNOSIS — M25.50 POLYARTHRALGIA: Primary | ICD-10-CM

## 2020-11-05 PROCEDURE — 72202 X-RAY EXAM SI JOINTS 3/> VWS: CPT | Performed by: INTERNAL MEDICINE

## 2020-11-05 PROCEDURE — 3008F BODY MASS INDEX DOCD: CPT | Performed by: INTERNAL MEDICINE

## 2020-11-05 PROCEDURE — 85652 RBC SED RATE AUTOMATED: CPT

## 2020-11-05 PROCEDURE — 86812 HLA TYPING A B OR C: CPT

## 2020-11-05 PROCEDURE — 36415 COLL VENOUS BLD VENIPUNCTURE: CPT

## 2020-11-05 PROCEDURE — 3074F SYST BP LT 130 MM HG: CPT | Performed by: INTERNAL MEDICINE

## 2020-11-05 PROCEDURE — 86038 ANTINUCLEAR ANTIBODIES: CPT

## 2020-11-05 PROCEDURE — 86140 C-REACTIVE PROTEIN: CPT

## 2020-11-05 PROCEDURE — 99214 OFFICE O/P EST MOD 30 MIN: CPT | Performed by: INTERNAL MEDICINE

## 2020-11-05 PROCEDURE — 86235 NUCLEAR ANTIGEN ANTIBODY: CPT

## 2020-11-05 PROCEDURE — 3078F DIAST BP <80 MM HG: CPT | Performed by: INTERNAL MEDICINE

## 2020-11-05 PROCEDURE — 82085 ASSAY OF ALDOLASE: CPT

## 2020-11-05 PROCEDURE — 86200 CCP ANTIBODY: CPT

## 2020-11-05 PROCEDURE — 82550 ASSAY OF CK (CPK): CPT

## 2020-11-05 PROCEDURE — 86039 ANTINUCLEAR ANTIBODIES (ANA): CPT

## 2020-11-05 PROCEDURE — 99072 ADDL SUPL MATRL&STAF TM PHE: CPT | Performed by: INTERNAL MEDICINE

## 2020-11-05 PROCEDURE — 86431 RHEUMATOID FACTOR QUANT: CPT

## 2020-11-05 RX ORDER — METOPROLOL SUCCINATE 25 MG/1
50 TABLET, EXTENDED RELEASE ORAL DAILY
COMMUNITY
Start: 2020-10-20

## 2020-11-05 RX ORDER — CHOLECALCIFEROL (VITAMIN D3) 25 MCG
1 CAPSULE ORAL DAILY
COMMUNITY

## 2020-11-05 RX ORDER — MAGNESIUM 200 MG
200 TABLET ORAL DAILY
COMMUNITY
End: 2021-12-13

## 2020-11-05 RX ORDER — LOSARTAN POTASSIUM 100 MG/1
100 TABLET ORAL DAILY
COMMUNITY
Start: 2020-10-09 | End: 2021-10-27 | Stop reason: ALTCHOICE

## 2020-11-05 NOTE — PATIENT INSTRUCTIONS
1. Check labs   2. Check si joint xray   3. Flexeril 5-10mg at night   4.  Return to clinic if anything is positive

## 2020-11-05 NOTE — PROGRESS NOTES
Assessment and plan:    1. Long history of probable mechanical low back pain. There is myofascial discomfort from her neck into her shoulders, across her right lower back, and over her right lateral hip. Her neck x-rays have shown osteoarthritis. She had a bone density test at [facility]. She was taking risendronate and she was thinking this might be triggering the pain - she stopped it one month ago. No change with her pain. She has 8/10 pain overall in he rright hip, shoulder, neck and back. At night she has low back discomfort. She is refreshed on awakening, and she has good energy. When she gets out of bed in the morning, she is stiffer. Her joints are not swollen. She takes aspirin, which helps.   There can be stiffness in her neck, fadia • Magnesium 200 MG Oral Tab Take 200 mg by mouth daily. • Omega-3 Fatty Acids (OMEGA-3 FISH OIL OR) Take 1 tablet by mouth daily. • acetaminophen 500 MG Oral Tab Take 1 tablet (500 mg total) by mouth every 6 (six) hours as needed.   0   • atorvastat 77 for 2nd reoccurrance      Social History:  Social History    Tobacco Use      Smoking status: Never Smoker      Smokeless tobacco: Never Used      Tobacco comment: rarely    Alcohol use: Not Currently      Alcohol/week: 0.0 standard drinks      Com Right hip bursitsi tender -   Right knee not tender at this time   -good rom of her hips. No edema      Component      Latest Ref Rng & Units 6/11/2018   Lupus Anticoag Screen Interp       Conclusion: Negative .  . .   PT      11.8 - 14.5 seconds 12.5   A 6/11/2018 - right shoulder xray   CONCLUSION:   1. Minimal osteoarthritis. 6/11/2018 - si joint xray   CONCLUSION:   1. Normal sacroiliac joints. 2. Transitional vertebra lumbosacral junction. 1/8/2020  Lumbar xray   CONCLUSION:   1.  Mini

## 2020-11-10 ENCOUNTER — TELEPHONE (OUTPATIENT)
Dept: RHEUMATOLOGY | Facility: CLINIC | Age: 55
End: 2020-11-10

## 2020-11-10 NOTE — TELEPHONE ENCOUNTER
Spoke to patient. Inquiring if there are any other labs that are currently processing as she noted LEOLA direct Screen came back positive. Patient was advised there are 3 other labs that are currently processing.  Once those are final office will call her to

## 2020-11-10 NOTE — TELEPHONE ENCOUNTER
Patient is requesting call back from nurse to discuss lab results she completed 11/05. Patient states she has a few questions.

## 2020-11-18 NOTE — TELEPHONE ENCOUNTER
Per patient she states that she would like to talk to Dr Rafat Penaloza in regards to her test results what to do next?

## 2020-11-18 NOTE — TELEPHONE ENCOUNTER
Please see patient's message below and advise. Results given per My Chart and viewed by patient. Does she need a follow up appointment? Please advise.

## 2020-11-24 NOTE — TELEPHONE ENCOUNTER
All labs WNL except for \"borderline\" LEOLA titer pattern. Dr. Luisa Gomez please advise if there is a message that can be relayed to patient or if you would like her to schedule follow up appointment.

## 2020-11-25 ENCOUNTER — TELEPHONE (OUTPATIENT)
Dept: RHEUMATOLOGY | Facility: CLINIC | Age: 55
End: 2020-11-25

## 2020-11-28 NOTE — TELEPHONE ENCOUNTER
Left message for pt. Regarding her lab results - her LEOLA was borderline - considered negative - also her SSA and SSB were negative. If she has questions she can call or send a message through my chart.

## 2021-01-05 ENCOUNTER — TELEPHONE (OUTPATIENT)
Dept: RHEUMATOLOGY | Facility: CLINIC | Age: 56
End: 2021-01-05

## 2021-01-05 ENCOUNTER — OFFICE VISIT (OUTPATIENT)
Dept: RHEUMATOLOGY | Facility: CLINIC | Age: 56
End: 2021-01-05
Payer: COMMERCIAL

## 2021-01-05 VITALS
SYSTOLIC BLOOD PRESSURE: 120 MMHG | WEIGHT: 111 LBS | HEART RATE: 86 BPM | DIASTOLIC BLOOD PRESSURE: 82 MMHG | BODY MASS INDEX: 21.79 KG/M2 | HEIGHT: 60 IN

## 2021-01-05 DIAGNOSIS — R76.8 POSITIVE ANA (ANTINUCLEAR ANTIBODY): ICD-10-CM

## 2021-01-05 DIAGNOSIS — M54.89 INFLAMMATORY BACK PAIN: Primary | ICD-10-CM

## 2021-01-05 DIAGNOSIS — M79.10 MYALGIA: ICD-10-CM

## 2021-01-05 PROCEDURE — 3008F BODY MASS INDEX DOCD: CPT | Performed by: INTERNAL MEDICINE

## 2021-01-05 PROCEDURE — 3074F SYST BP LT 130 MM HG: CPT | Performed by: INTERNAL MEDICINE

## 2021-01-05 PROCEDURE — 99214 OFFICE O/P EST MOD 30 MIN: CPT | Performed by: INTERNAL MEDICINE

## 2021-01-05 PROCEDURE — 3079F DIAST BP 80-89 MM HG: CPT | Performed by: INTERNAL MEDICINE

## 2021-01-05 NOTE — PROGRESS NOTES
Assessment and plan:    1. Long history of probable mechanical low back pain. There is myofascial discomfort from her neck into her shoulders, across her right lower back, and over her right lateral hip. Her neck x-rays have shown osteoarthritis. She had a bone density test at [facility]. She was taking risendronate and she was thinking this might be triggering the pain - she stopped it one month ago. No change with her pain. She has 8/10 pain overall in he rright hip, shoulder, neck and back. which helps. There can be stiffness in her neck, going down to her shoulders and mid back. Her muscles tense up. Massage helps. 11/5/2020  She had a TIA/CVA while water skiing in 7/2020 - saw ER and was told it was vertigo.    Similar sx happened 4 da hurts.   The pain in the am - she is stiff and hurts 9/10. Then the pain can go to a 5/10 - after a santa. The pain doesn't always happen at night. Last night she ha haritha in her lower back - but it can be her upper and mid back mostly.    manageing her MG Oral Tab Take 1 tablet by mouth every 4 (four) hours as needed for Headaches.  (Patient not taking: Reported on 11/5/2020 ) 30 tablet 0      Past Medical History:   Diagnosis Date   • Essential hypertension    • Hepatitis C     resolved   • ITP (idiopath Psych: no hx of anxiety or depression  ENDO: no hx of thyroid disease, no hx of DM  Joint/Muscluskeltal: see HPI,   All other ROS are negative.      EXAM:   /82   Pulse 86   Ht 5' (1.524 m)   Wt 111 lb (50.3 kg)   LMP 02/01/2016 (Exact Date)   BMI 2 Negative Negative   RHEUMATOID FACTOR      <15 IU/mL <10   Anti-Sjogren's A      Negative Negative   Anti-Sjogren's B      Negative Negative     Care everywhere -   1/20/2018 - DEXA  LUMBAR SPINE:  The AVG BMD OF the L1-L4 region = 0.837 gm/cm2, T-score =  ASSESSMENT AND PLAN:   Naren Yepez is a 60year old female who presents for Patient presents with: Follow - Up  Back Pain    1.  Increasing back pain - inflammatory pattern of back pain - worse at night - also has musculoskelatal pains - hx of IT back in -   4.  Check labs in 5/2021      Jessica Nova MD  1/5/2021   2:51 PM  - Reviewed [place]- [place]  through Epic

## 2021-01-05 NOTE — PATIENT INSTRUCTIONS
1. Check mri si joint   2. Try Flexeril (cyclobenzaprine ) 10mg at night at night   3. Will call with results -depending on results will have you follow back in -   4.  Check labs in 5/2021

## 2021-01-06 ENCOUNTER — PATIENT MESSAGE (OUTPATIENT)
Dept: RHEUMATOLOGY | Facility: CLINIC | Age: 56
End: 2021-01-06

## 2021-01-07 RX ORDER — CYCLOBENZAPRINE HCL 10 MG
10 TABLET ORAL NIGHTLY
Qty: 30 TABLET | Refills: 0 | Status: CANCELLED | OUTPATIENT
Start: 2021-01-07

## 2021-01-07 RX ORDER — CYCLOBENZAPRINE HCL 5 MG
TABLET ORAL
Qty: 60 TABLET | Refills: 3 | Status: SHIPPED | OUTPATIENT
Start: 2021-01-07 | End: 2021-10-27

## 2021-01-07 NOTE — TELEPHONE ENCOUNTER
From: Filemon Toribio  To: Iván Capone MD  Sent: 1/6/2021 10:46 PM CST  Subject: Prescription Question    Could I please have a refill on the cyclobenzaprine 5 mg. prescription?  At my last visit you suggested I try 2 pills at night when I experienc

## 2021-01-07 NOTE — TELEPHONE ENCOUNTER
Dr. Jessica Joel please review pended order. .    Summary:  1. Check mri si joint   2. Try Flexeril (cyclobenzaprine ) 10mg at night at night   3. Will call with results -depending on results will have you follow back in -   4.  Check labs in 5/2021        Flavio Arshad

## 2021-05-03 ENCOUNTER — TELEPHONE (OUTPATIENT)
Dept: RHEUMATOLOGY | Facility: CLINIC | Age: 56
End: 2021-05-03

## 2021-05-03 NOTE — TELEPHONE ENCOUNTER
Patient, states that Dr. Fuentes Listen wanted her to repeat blood work, Pt is not sure which ones. Please, let pt know when the orders are entered in the system. Please, notify pt in her mychart when entered.

## 2021-05-06 ENCOUNTER — HOSPITAL ENCOUNTER (OUTPATIENT)
Age: 56
Discharge: HOME | End: 2021-05-06
Attending: PHYSICIAN ASSISTANT
Payer: COMMERCIAL

## 2021-05-06 ENCOUNTER — LAB ENCOUNTER (OUTPATIENT)
Dept: LAB | Facility: HOSPITAL | Age: 56
End: 2021-05-06
Attending: INTERNAL MEDICINE
Payer: COMMERCIAL

## 2021-05-06 VITALS
WEIGHT: 110 LBS | HEIGHT: 60 IN | RESPIRATION RATE: 16 BRPM | OXYGEN SATURATION: 100 % | HEART RATE: 73 BPM | SYSTOLIC BLOOD PRESSURE: 162 MMHG | DIASTOLIC BLOOD PRESSURE: 94 MMHG | BODY MASS INDEX: 21.6 KG/M2 | TEMPERATURE: 98 F

## 2021-05-06 DIAGNOSIS — R21 RASH: Primary | ICD-10-CM

## 2021-05-06 DIAGNOSIS — M54.89 INFLAMMATORY BACK PAIN: ICD-10-CM

## 2021-05-06 DIAGNOSIS — R03.0 ELEVATED BLOOD PRESSURE READING: ICD-10-CM

## 2021-05-06 DIAGNOSIS — R76.8 POSITIVE ANA (ANTINUCLEAR ANTIBODY): ICD-10-CM

## 2021-05-06 PROCEDURE — 36415 COLL VENOUS BLD VENIPUNCTURE: CPT

## 2021-05-06 PROCEDURE — 85027 COMPLETE CBC AUTOMATED: CPT

## 2021-05-06 PROCEDURE — 99213 OFFICE O/P EST LOW 20 MIN: CPT | Performed by: PHYSICIAN ASSISTANT

## 2021-05-06 PROCEDURE — 86038 ANTINUCLEAR ANTIBODIES: CPT

## 2021-05-06 RX ORDER — PREDNISONE 20 MG/1
40 TABLET ORAL DAILY
Qty: 10 TABLET | Refills: 0 | Status: SHIPPED | OUTPATIENT
Start: 2021-05-06 | End: 2021-05-11

## 2021-05-06 RX ORDER — DIPHENHYDRAMINE HCL 25 MG
CAPSULE ORAL
Qty: 40 CAPSULE | Refills: 0 | Status: SHIPPED | OUTPATIENT
Start: 2021-05-06 | End: 2021-05-11

## 2021-05-06 NOTE — ED INITIAL ASSESSMENT (HPI)
Red itchy bumps to R arm, neck, and groin area. First noticed it on  Monday. Denies use of any new products.

## 2021-05-06 NOTE — ED PROVIDER NOTES
Patient Seen in: [facility]    History   Patient presents with:  Rash Skin Problem    Stated Complaint: Rash    ELADIO Calix is a 60year old female who presents with chief complaint of rash. Onset 3 days ago.   Rash is located at Ciclopirox Olamine 0.77 % External Cream,  Apply topically 2 (two) times daily. APPLY TO AFFECTED AREA   Losartan Potassium 25 MG Oral Tab,  Take by mouth daily.    acetaminophen 500 MG Oral Tab,  Take 1 tablet (500 mg total) by mouth every 6 (six) hours as Temp 97.9 °F (36.6 °C)   Temp src Temporal   SpO2 100 %   O2 Device None (Room air)       Current:BP (!) 162/94   Pulse 73   Temp 97.9 °F (36.6 °C) (Temporal)   Resp 16   Ht 152.4 cm (5')   Wt 49.9 kg   LMP 02/01/2016 (Exact Date)   SpO2 100%   BMI 21.48 exam findings discussed with patient. I have given the patient instructions regarding their diagnoses, expectations, follow up, and ER precautions.  I explained to the patient that emergent conditions may arise and to go to the ER for new, worsening or a

## 2021-09-28 ENCOUNTER — HOSPITAL ENCOUNTER (OUTPATIENT)
Age: 56
Discharge: HOME | End: 2021-09-28
Payer: COMMERCIAL

## 2021-09-28 VITALS
HEIGHT: 60 IN | RESPIRATION RATE: 20 BRPM | OXYGEN SATURATION: 98 % | BODY MASS INDEX: 23.56 KG/M2 | HEART RATE: 110 BPM | TEMPERATURE: 98 F | DIASTOLIC BLOOD PRESSURE: 95 MMHG | WEIGHT: 120 LBS | SYSTOLIC BLOOD PRESSURE: 158 MMHG

## 2021-09-28 DIAGNOSIS — H65.191 ACUTE MEE (MIDDLE EAR EFFUSION), RIGHT: ICD-10-CM

## 2021-09-28 DIAGNOSIS — J01.40 ACUTE NON-RECURRENT PANSINUSITIS: Primary | ICD-10-CM

## 2021-09-28 PROCEDURE — 99213 OFFICE O/P EST LOW 20 MIN: CPT | Performed by: EMERGENCY MEDICINE

## 2021-09-28 RX ORDER — AMOXICILLIN AND CLAVULANATE POTASSIUM 875; 125 MG/1; MG/1
1 TABLET, FILM COATED ORAL 2 TIMES DAILY
Qty: 20 TABLET | Refills: 0 | Status: SHIPPED | OUTPATIENT
Start: 2021-09-28 | End: 2021-10-08

## 2021-09-28 RX ORDER — PREDNISONE 20 MG/1
TABLET ORAL
Qty: 8 TABLET | Refills: 0 | Status: SHIPPED | OUTPATIENT
Start: 2021-09-28 | End: 2021-10-03

## 2021-09-28 NOTE — ED PROVIDER NOTES
Patient Seen in: [facility]      History   Patient presents with:  Cough/URI    Stated Complaint: SINUS INFECTION HA EAR PAIN     Subjective:   HPI  Naren Yepez is a 60year old female here for sinus congestion, drainage, and ear fullness Date)   SpO2 98%   BMI 23.44 kg/m²         Physical Exam  Vitals and nursing note reviewed. Constitutional:       Appearance: Normal appearance. She is not ill-appearing. HENT:      Head: Normocephalic. Right Ear: A middle ear effusion is present. intact. RX  amoxicillin clavulanate 875-125 MG Oral Tab  Take 1 tablet by mouth 2 (two) times daily for 10 days. , Normal, Disp-20 tablet, R-0    predniSONE 20 MG Oral Tab  Take 2 tablets (40 mg total) by mouth daily for 3 days, THEN 1 tablet (20 mg tota

## 2021-10-27 ENCOUNTER — TELEPHONE (OUTPATIENT)
Dept: RHEUMATOLOGY | Facility: CLINIC | Age: 56
End: 2021-10-27

## 2021-10-27 ENCOUNTER — OFFICE VISIT (OUTPATIENT)
Dept: RHEUMATOLOGY | Facility: CLINIC | Age: 56
End: 2021-10-27
Payer: COMMERCIAL

## 2021-10-27 VITALS
SYSTOLIC BLOOD PRESSURE: 133 MMHG | RESPIRATION RATE: 16 BRPM | BODY MASS INDEX: 23.75 KG/M2 | HEART RATE: 69 BPM | HEIGHT: 60 IN | WEIGHT: 121 LBS | DIASTOLIC BLOOD PRESSURE: 90 MMHG

## 2021-10-27 DIAGNOSIS — M54.89 INFLAMMATORY BACK PAIN: ICD-10-CM

## 2021-10-27 DIAGNOSIS — M54.50 CHRONIC RIGHT-SIDED LOW BACK PAIN WITHOUT SCIATICA: Primary | ICD-10-CM

## 2021-10-27 DIAGNOSIS — M25.50 POLYARTHRALGIA: ICD-10-CM

## 2021-10-27 DIAGNOSIS — G89.29 CHRONIC RIGHT-SIDED LOW BACK PAIN WITHOUT SCIATICA: Primary | ICD-10-CM

## 2021-10-27 PROCEDURE — 3080F DIAST BP >= 90 MM HG: CPT | Performed by: INTERNAL MEDICINE

## 2021-10-27 PROCEDURE — 3008F BODY MASS INDEX DOCD: CPT | Performed by: INTERNAL MEDICINE

## 2021-10-27 PROCEDURE — 99214 OFFICE O/P EST MOD 30 MIN: CPT | Performed by: INTERNAL MEDICINE

## 2021-10-27 PROCEDURE — 3075F SYST BP GE 130 - 139MM HG: CPT | Performed by: INTERNAL MEDICINE

## 2021-10-27 RX ORDER — CYCLOBENZAPRINE HCL 5 MG
TABLET ORAL
Qty: 60 TABLET | Refills: 1 | Status: SHIPPED | OUTPATIENT
Start: 2021-10-27

## 2021-10-27 NOTE — PROGRESS NOTES
Tash Matt is a 60year old female who presents for Patient presents with:  Joint Pain  . HPI:     I had the pleasure of seeing Tash Matt on 6/11/2018 for evaluation. She is a pleasant 52year old who has joint pain.  She was referred by Tests negative for RA in 2015 and now negative for lupus and antiphoshpolipid syndrome.    She did physical therapy - for 2 months - she felt it helpe her neck and shoulder - her low back - right side hurts as well as now right knee and right hip -   seh' time.   When she waks up in the morning her hands and toes in her feet - she has stiffness. Laying down she feels swollen and puffy. In the morning her pain is 8/10 - discomfort. She has a had time walking - it's about 20 steps.  Now she has 4/10 pain  ud driving to her son's [employer] in [facility] - she takes 1-2 daily but in the car rides she takes more. She feels it helps but it doesn't take it all away. flerxil helps a night when it bothers her.      She had fungal infection of her toes and needed to sig reported) 60 tablet 3   • Ciclopirox Olamine 0.77 % External Cream Apply topically 2 (two) times daily.  APPLY TO AFFECTED AREA (Patient not taking: Reported on 10/27/2021)     • amLODIPine Besylate 2.5 MG Oral Tab Take 1 tablet (2.5 mg total) by mouth No Pleurtic pain,   GI: No nausea, no vomiiting, no abominal pain, no hx of ulcer, gastritis,  heartburn, no dyshpagia, no BRBPR or melena  Hx of hep c - treated and cured,   : no dysuria, 6 hx of miscarriages, no DVT Hx, no hx of OCP  Hx of kidney stone SERUM      1.5 - 8.1 U/L 2.1     Component      Latest Ref Rng & Units 11/5/2020   LEOLA Titer/Pattern      <80 80 (A)   Reviewed By:       Tresa Moura M.D.    ALDOLASE, SERUM      1.5 - 8.1 U/L 4.0   CK      26 - 192 U/L 133   C-REACTIVE PROTEIN      < Subchondral bony cystic changes     humeral head. 4. No other significant abnormalities    6/11/2018 - c spine xray   CONCLUSION:   1.  Minimal osteoarthritis showing minimal progression from November 7, 2013.     6/11/2018 - right shoulder xray   CONCLUSI Physical therapy for lower back -never helped her back -  it helped the shoulder and neck , her back still hurts. - had to stop it b/c things got crazy, -   Flexeril 5-10mg at night prn -made her too drowsy      2. Hx of hep c - treated at [facility] 2015.

## 2021-10-27 NOTE — PATIENT INSTRUCTIONS
1. Check mri si joint   2. occl Flexeril (cyclobenzaprine ) 10mg at night at night   3.check labs   4. Cont. Physical therapy -   5. Return to clinic in 2-3 weeks.

## 2021-10-28 NOTE — TELEPHONE ENCOUNTER
Insurance contacted at [phone] and no PA required for MRI exam. Call reference number [id number]    Dr. Albina Westfall- pt is aware of approval but she is waiting to hear back from you if it needs to be completed or not.

## 2021-10-28 NOTE — TELEPHONE ENCOUNTER
Talked to pt. She got the addresses - for DR. Eladia Longoria Child -   Can we request the mri lumbar spines with the request done yesterday       Dr. Nidhi Ramos  [address] [facility] [address] [facility]    Dr. Vilma Mcguire  Orthopedic Surg

## 2021-10-29 NOTE — TELEPHONE ENCOUNTER
Attempted to call   Their medical department does not accept verbal    They said to fax it. It has been faxed mutliple times and failed. E-mail sent but they were not able to open it. E-mail sent directly from my work e-mail.

## 2021-11-09 ENCOUNTER — OFFICE VISIT (OUTPATIENT)
Dept: RHEUMATOLOGY | Facility: CLINIC | Age: 56
End: 2021-11-09
Payer: COMMERCIAL

## 2021-11-09 VITALS
WEIGHT: 121 LBS | RESPIRATION RATE: 16 BRPM | HEIGHT: 60 IN | HEART RATE: 70 BPM | SYSTOLIC BLOOD PRESSURE: 142 MMHG | BODY MASS INDEX: 23.75 KG/M2 | DIASTOLIC BLOOD PRESSURE: 92 MMHG

## 2021-11-09 DIAGNOSIS — M54.89 INFLAMMATORY BACK PAIN: Primary | ICD-10-CM

## 2021-11-09 DIAGNOSIS — M54.50 CHRONIC RIGHT-SIDED LOW BACK PAIN WITHOUT SCIATICA: ICD-10-CM

## 2021-11-09 DIAGNOSIS — G89.29 CHRONIC RIGHT-SIDED LOW BACK PAIN WITHOUT SCIATICA: ICD-10-CM

## 2021-11-09 PROCEDURE — 3077F SYST BP >= 140 MM HG: CPT | Performed by: INTERNAL MEDICINE

## 2021-11-09 PROCEDURE — 3008F BODY MASS INDEX DOCD: CPT | Performed by: INTERNAL MEDICINE

## 2021-11-09 PROCEDURE — 3080F DIAST BP >= 90 MM HG: CPT | Performed by: INTERNAL MEDICINE

## 2021-11-09 PROCEDURE — 99213 OFFICE O/P EST LOW 20 MIN: CPT | Performed by: INTERNAL MEDICINE

## 2021-11-09 NOTE — PROGRESS NOTES
Broderick De Jesus is a 60year old female who presents for Patient presents with:  Medication Follow-Up  Hip Pain: Right  Neck Pain: Left  Headache  . HPI:     I had the pleasure of seeing Broderick De Jesus on 6/11/2018 for evaluation.      She is a pleasan as bad. But now it's right knee and her right hip. Tests negative for RA in 2015 and now negative for lupus and antiphoshpolipid syndrome.    She did physical therapy - for 2 months - she felt it helpe her neck and shoulder - her low back - right side h arthritis she has. She has left shoulder pains at time. When she waks up in the morning her hands and toes in her feet - she has stiffness. Laying down she feels swollen and puffy. In the morning her pain is 8/10 - discomfort.    She has a had time walk injections there - 8/20/2021 - it never worked. 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 She feels it helps but it doesn't take it all away. flerxil helps a night when it bothers her. hours as needed. 0   • atorvastatin 40 MG Oral Tab Take 1 tablet (40 mg total) by mouth nightly. 30 tablet 1   • aspirin 81 MG Oral Chew Tab Chew 1 tablet (81 mg total) by mouth daily.  30 tablet 1   • Multiple Minerals-Vitamins (CITRACAL PLUS OR) Take 1 t Systems:  Fatigue  Constitutional:No fever, no change in weight or appetitie  Derm: No rashes, no oral ulcers, no alopecia, no photosensitivity, no psoriasis  HEENT: No dry eyes, no dry mouth, no Raynaud's, no nasal ulcers, no parotid swelling, neck pain, Cardiolipin IgG Antibody      0.0 - 14.9 GPL 4.4   Cardiolipin IgM Antibody      0.0 - 12.4 MPL 3.8   BETA 2 GLYCOPROTEIN 1 AB, IgM      <=15.0 U/mL <3.7   BETA 2 GLYCOPROTEIN 1 AB, IgG      <=15.0 U/mL <3.7   C-REACTIVE PROTEIN      0.0 - 0.9 mg/dL 0.5 1230/2015 - rf neg, ccp neg, izabel neg,   4/26/2018 - left shoulder MRI   1. Significant tendinosis distal supraspinatus and subscapularis tendons. 2. Partial-thickness tear distal supraspinatus along the articular margin     near the distal insertion.  Si will reorder this since the right si joint is still huriting   ? Piriformis syndrome is another possilblity.    - not able to get records from dr. Burke Mullins and asher from [place] about her maxx lumbar spine -  - will need input from pcp or hematology about using n

## 2021-11-09 NOTE — PATIENT INSTRUCTIONS
1. Check mri si joint   2. occl Flexeril (cyclobenzaprine ) 10mg at night at night   3.check labs   4. Cont. Physical therapy -   5. Return to clinic in 2-3 weeks.      Piriformis syndrome — Piriformis syndrome is a controversial entrapment neuropathy (figu external rotation. Pain and reproduction of symptoms marks a positive test. When necessary, plain radiographs and MRI of the hip and pelvis are obtained to rule out other causes of symptoms.  EMG and nerve conduction studies are rarely positive in piriformi

## 2021-11-16 ENCOUNTER — PATIENT MESSAGE (OUTPATIENT)
Dept: RHEUMATOLOGY | Facility: CLINIC | Age: 56
End: 2021-11-16

## 2021-11-16 ENCOUNTER — TELEPHONE (OUTPATIENT)
Dept: RHEUMATOLOGY | Facility: CLINIC | Age: 56
End: 2021-11-16

## 2021-11-16 NOTE — TELEPHONE ENCOUNTER
From: Rina Morillo  Sent: 11/16/2021 12:44 PM CST  To: Jess Weber Clinical Staff  Subject: Medication    Thank you

## 2021-11-16 NOTE — TELEPHONE ENCOUNTER
Dr. Martin Bergeron, patient is requesting medicine to help her during MRI since she is claustrophobic, please advise.      From: Jeaneth Mendez  To: Lauryn Peguero MD  Sent: 11/16/2021 11:42 AM CST  Subject: Medication    Hello, I have an MRI scheduled for [facility]

## 2021-11-16 NOTE — TELEPHONE ENCOUNTER
From: Tika Gaffney  To: Roseann Whitmore MD  Sent: 11/16/2021 11:42 AM CST  Subject: Medication    Hello, I have an MRI scheduled for Thursday and was wondering if you could send in a prescription for something for the MRI since I am claustrophobic.

## 2021-11-17 RX ORDER — LORAZEPAM 0.5 MG/1
0.5 TABLET ORAL DAILY PRN
Qty: 2 TABLET | Refills: 0 | Status: SHIPPED | OUTPATIENT
Start: 2021-11-17 | End: 2021-12-13 | Stop reason: ALTCHOICE

## 2021-11-17 NOTE — TELEPHONE ENCOUNTER
Spoke to patient and informed of Dr. Yogesh Kimble note. She was also advised to take someone else to drive her just incase since she is taking ativan. She verbalized understanding and said she will have someone drive her.

## 2021-11-18 ENCOUNTER — HOSPITAL ENCOUNTER (OUTPATIENT)
Dept: MRI IMAGING | Facility: HOSPITAL | Age: 56
Discharge: HOME | End: 2021-11-18
Attending: INTERNAL MEDICINE
Payer: COMMERCIAL

## 2021-11-18 ENCOUNTER — LAB ENCOUNTER (OUTPATIENT)
Dept: LAB | Facility: HOSPITAL | Age: 56
End: 2021-11-18
Attending: INTERNAL MEDICINE
Payer: COMMERCIAL

## 2021-11-18 DIAGNOSIS — M54.50 CHRONIC RIGHT-SIDED LOW BACK PAIN WITHOUT SCIATICA: ICD-10-CM

## 2021-11-18 DIAGNOSIS — M25.50 POLYARTHRALGIA: ICD-10-CM

## 2021-11-18 DIAGNOSIS — G89.29 CHRONIC RIGHT-SIDED LOW BACK PAIN WITHOUT SCIATICA: ICD-10-CM

## 2021-11-18 PROCEDURE — 85652 RBC SED RATE AUTOMATED: CPT

## 2021-11-18 PROCEDURE — 36415 COLL VENOUS BLD VENIPUNCTURE: CPT

## 2021-11-18 PROCEDURE — 80053 COMPREHEN METABOLIC PANEL: CPT

## 2021-11-18 PROCEDURE — 72195 MRI PELVIS W/O DYE: CPT | Performed by: INTERNAL MEDICINE

## 2021-11-18 PROCEDURE — 86140 C-REACTIVE PROTEIN: CPT

## 2021-11-18 PROCEDURE — 85027 COMPLETE CBC AUTOMATED: CPT

## 2021-11-30 ENCOUNTER — TELEPHONE (OUTPATIENT)
Dept: RHEUMATOLOGY | Facility: CLINIC | Age: 56
End: 2021-11-30

## 2021-11-30 NOTE — TELEPHONE ENCOUNTER
Pt dropped a CD of her MRI from [facility]. CD was give to RN at [place] [id number] in [facility]. Pt would like the CD returned after Dr Sydni Garrett has viewed it.

## 2021-12-13 ENCOUNTER — OFFICE VISIT (OUTPATIENT)
Dept: RHEUMATOLOGY | Facility: CLINIC | Age: 56
End: 2021-12-13
Payer: COMMERCIAL

## 2021-12-13 VITALS
BODY MASS INDEX: 23.95 KG/M2 | SYSTOLIC BLOOD PRESSURE: 158 MMHG | WEIGHT: 122 LBS | DIASTOLIC BLOOD PRESSURE: 106 MMHG | RESPIRATION RATE: 16 BRPM | HEIGHT: 60 IN | HEART RATE: 73 BPM

## 2021-12-13 DIAGNOSIS — G89.29 CHRONIC RIGHT-SIDED LOW BACK PAIN WITHOUT SCIATICA: Primary | ICD-10-CM

## 2021-12-13 DIAGNOSIS — M79.10 MYALGIA: ICD-10-CM

## 2021-12-13 DIAGNOSIS — M54.50 CHRONIC RIGHT-SIDED LOW BACK PAIN WITHOUT SCIATICA: Primary | ICD-10-CM

## 2021-12-13 PROCEDURE — 3008F BODY MASS INDEX DOCD: CPT | Performed by: INTERNAL MEDICINE

## 2021-12-13 PROCEDURE — 3077F SYST BP >= 140 MM HG: CPT | Performed by: INTERNAL MEDICINE

## 2021-12-13 PROCEDURE — 3080F DIAST BP >= 90 MM HG: CPT | Performed by: INTERNAL MEDICINE

## 2021-12-13 PROCEDURE — 99213 OFFICE O/P EST LOW 20 MIN: CPT | Performed by: INTERNAL MEDICINE

## 2021-12-13 NOTE — PROGRESS NOTES
Alejandro Longo is a 60year old female who presents for Patient presents with:  Results: labs and MRI  Medication Follow-Up  Back Pain: LOW  . HPI:     I had the pleasure of seeing Alejandro Longo on 6/11/2018 for evaluation.      She is a pleasant 52 bad.   But now it's right knee and her right hip. Tests negative for RA in 2015 and now negative for lupus and antiphoshpolipid syndrome.    She did physical therapy - for 2 months - she felt it helpe her neck and shoulder - her low back - right side hurt arthritis she has. She has left shoulder pains at time. When she waks up in the morning her hands and toes in her feet - she has stiffness. Laying down she feels swollen and puffy. In the morning her pain is 8/10 - discomfort.    She has a had time walk injections there - 8/20/2021 - it never worked. 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 She feels it helps but it doesn't take it all away. flerxil helps a night when it bothers her. mg by mouth daily. • Cholecalciferol (VITAMIN D-3) 25 MCG (1000 UT) Oral Cap Take 1 tablet by mouth daily. • Losartan Potassium 25 MG Oral Tab Take by mouth daily.      • acetaminophen 500 MG Oral Tab Take 1 tablet (500 mg total) by mouth every 6 (s neck pain, no jaw pain, no temple pain  Had sinus sx -   Eyes: No visual changes,   CVS: No chest pain, no heart disease  RS: No SOB, no Cough, No Pleurtic pain,   GI: No nausea, no vomiiting, no abominal pain, no hx of ulcer, gastritis,  heartburn, no dys SED RATE      0 - 30 mm/Hr 7   URIC ACID      2.1 - 7.4 mg/dL 4.0   CK      38 - 234 U/L 97   LEOLA SCREEN      Negative Negative   ALDOLASE, SERUM      1.5 - 8.1 U/L 2.1     Component      Latest Ref Rng & Units 11/5/2020   LEOLA Titer/Pattern      <80 80 ( 3.4 - 5.0 g/dL 3.7   Globulin      2.8 - 4.4 g/dL 3.7   A/G Ratio      1.0 - 2.0 1.0   Patient Fasting?        Yes   WBC      4.0 - 11.0 x10(3) uL 6.7   RBC      3.80 - 5.30 x10(6)uL 4.75   Hemoglobin      12.0 - 16.0 g/dL 14.9   Hematocrit      35.0 - 48 Minimal osteoarthritis. 3. Status post splenectomy. 11/5/2020 - si joint xray   1.  Unchanged study from previous of 1/8/2020 read demonstrating a transitional S1 vertebral body as discussed above with pseudoarthrosis of the upper aspect of the right SI Physical therapy for lower back -never helped her back -  it helped the shoulder and neck , her back still hurts. - had to stop it b/c things got crazy, -   Flexeril 5-10mg at night prn -made her too drowsy      2. Hx of hep c - treated at [facility] 2015.

## 2022-01-07 ENCOUNTER — OFFICE VISIT (OUTPATIENT)
Dept: OBGYN CLINIC | Facility: CLINIC | Age: 57
End: 2022-01-07
Payer: COMMERCIAL

## 2022-01-07 VITALS
BODY MASS INDEX: 24 KG/M2 | SYSTOLIC BLOOD PRESSURE: 154 MMHG | HEART RATE: 62 BPM | WEIGHT: 122 LBS | DIASTOLIC BLOOD PRESSURE: 94 MMHG

## 2022-01-07 DIAGNOSIS — Z12.31 SCREENING MAMMOGRAM FOR BREAST CANCER: ICD-10-CM

## 2022-01-07 DIAGNOSIS — Z01.419 ENCOUNTER FOR GYNECOLOGICAL EXAMINATION WITHOUT ABNORMAL FINDING: Primary | ICD-10-CM

## 2022-01-07 PROCEDURE — 99396 PREV VISIT EST AGE 40-64: CPT | Performed by: OBSTETRICS & GYNECOLOGY

## 2022-01-07 PROCEDURE — 3080F DIAST BP >= 90 MM HG: CPT | Performed by: OBSTETRICS & GYNECOLOGY

## 2022-01-07 PROCEDURE — 3077F SYST BP >= 140 MM HG: CPT | Performed by: OBSTETRICS & GYNECOLOGY

## 2022-02-01 ENCOUNTER — HOSPITAL ENCOUNTER (OUTPATIENT)
Dept: MAMMOGRAPHY | Age: 57
Discharge: HOME | End: 2022-02-01
Attending: OBSTETRICS & GYNECOLOGY
Payer: COMMERCIAL

## 2022-02-01 DIAGNOSIS — Z12.31 SCREENING MAMMOGRAM FOR BREAST CANCER: ICD-10-CM

## 2022-02-01 PROCEDURE — 77063 BREAST TOMOSYNTHESIS BI: CPT | Performed by: OBSTETRICS & GYNECOLOGY

## 2022-02-01 PROCEDURE — 77067 SCR MAMMO BI INCL CAD: CPT | Performed by: OBSTETRICS & GYNECOLOGY

## 2022-03-17 ENCOUNTER — HOSPITAL ENCOUNTER (OUTPATIENT)
Age: 57
Discharge: HOME | End: 2022-03-17
Payer: COMMERCIAL

## 2022-03-17 VITALS
WEIGHT: 111 LBS | HEART RATE: 74 BPM | SYSTOLIC BLOOD PRESSURE: 140 MMHG | BODY MASS INDEX: 21.79 KG/M2 | HEIGHT: 60 IN | RESPIRATION RATE: 16 BRPM | OXYGEN SATURATION: 99 % | TEMPERATURE: 98 F | DIASTOLIC BLOOD PRESSURE: 86 MMHG

## 2022-03-17 DIAGNOSIS — J01.00 ACUTE MAXILLARY SINUSITIS, RECURRENCE NOT SPECIFIED: Primary | ICD-10-CM

## 2022-03-17 DIAGNOSIS — H65.193 ACUTE MEE (MIDDLE EAR EFFUSION), BILATERAL: ICD-10-CM

## 2022-03-17 PROCEDURE — 99213 OFFICE O/P EST LOW 20 MIN: CPT | Performed by: NURSE PRACTITIONER

## 2022-03-17 RX ORDER — AMOXICILLIN 875 MG/1
875 TABLET, COATED ORAL 2 TIMES DAILY
Qty: 20 TABLET | Refills: 0 | Status: SHIPPED | OUTPATIENT
Start: 2022-03-17 | End: 2022-03-27

## 2022-03-17 NOTE — ED INITIAL ASSESSMENT (HPI)
Pt c/o congestion, feeling like she is \"under water\". C/o bilateral ear pain and sinus pressure 8/10. Reports neck tension, denies injury.  Has seen sinus specialist outpatient in that past

## 2022-04-16 ENCOUNTER — OFFICE VISIT (OUTPATIENT)
Dept: OTOLARYNGOLOGY | Facility: CLINIC | Age: 57
End: 2022-04-16
Payer: COMMERCIAL

## 2022-04-16 VITALS — HEIGHT: 60 IN | BODY MASS INDEX: 21.99 KG/M2 | WEIGHT: 112 LBS

## 2022-04-16 DIAGNOSIS — H92.01 RIGHT EAR PAIN: Primary | ICD-10-CM

## 2022-04-16 PROCEDURE — 99203 OFFICE O/P NEW LOW 30 MIN: CPT | Performed by: OTOLARYNGOLOGY

## 2022-04-16 PROCEDURE — 3008F BODY MASS INDEX DOCD: CPT | Performed by: OTOLARYNGOLOGY

## 2022-05-11 ENCOUNTER — OFFICE VISIT (OUTPATIENT)
Dept: FAMILY MEDICINE CLINIC | Facility: CLINIC | Age: 57
End: 2022-05-11
Payer: COMMERCIAL

## 2022-05-11 VITALS
OXYGEN SATURATION: 99 % | DIASTOLIC BLOOD PRESSURE: 94 MMHG | SYSTOLIC BLOOD PRESSURE: 150 MMHG | RESPIRATION RATE: 20 BRPM | TEMPERATURE: 98 F | HEART RATE: 79 BPM

## 2022-05-11 DIAGNOSIS — Z20.822 ENCOUNTER FOR LABORATORY TESTING FOR COVID-19 VIRUS: Primary | ICD-10-CM

## 2022-05-11 LAB — SARS-COV-2 RNA RESP QL NAA+PROBE: NOT DETECTED

## 2022-05-11 PROCEDURE — 3077F SYST BP >= 140 MM HG: CPT

## 2022-05-11 PROCEDURE — 99213 OFFICE O/P EST LOW 20 MIN: CPT

## 2022-05-11 PROCEDURE — 3080F DIAST BP >= 90 MM HG: CPT

## 2022-06-01 ENCOUNTER — OFFICE VISIT (OUTPATIENT)
Dept: FAMILY MEDICINE CLINIC | Facility: CLINIC | Age: 57
End: 2022-06-01
Payer: COMMERCIAL

## 2022-06-01 VITALS
BODY MASS INDEX: 21.6 KG/M2 | HEIGHT: 60 IN | TEMPERATURE: 99 F | HEART RATE: 72 BPM | RESPIRATION RATE: 18 BRPM | SYSTOLIC BLOOD PRESSURE: 155 MMHG | DIASTOLIC BLOOD PRESSURE: 96 MMHG | OXYGEN SATURATION: 97 % | WEIGHT: 110 LBS

## 2022-06-01 DIAGNOSIS — J40 BRONCHITIS: Primary | ICD-10-CM

## 2022-06-01 DIAGNOSIS — I10 ELEVATED BLOOD PRESSURE READING IN OFFICE WITH DIAGNOSIS OF HYPERTENSION: ICD-10-CM

## 2022-06-01 LAB — SARS-COV-2 RNA RESP QL NAA+PROBE: NOT DETECTED

## 2022-06-01 PROCEDURE — 3080F DIAST BP >= 90 MM HG: CPT | Performed by: NURSE PRACTITIONER

## 2022-06-01 PROCEDURE — 3008F BODY MASS INDEX DOCD: CPT | Performed by: NURSE PRACTITIONER

## 2022-06-01 PROCEDURE — 3077F SYST BP >= 140 MM HG: CPT | Performed by: NURSE PRACTITIONER

## 2022-06-01 PROCEDURE — 99213 OFFICE O/P EST LOW 20 MIN: CPT | Performed by: NURSE PRACTITIONER

## 2022-06-01 RX ORDER — ALBUTEROL SULFATE 90 UG/1
2 AEROSOL, METERED RESPIRATORY (INHALATION) EVERY 6 HOURS PRN
Qty: 1 EACH | Refills: 0 | Status: SHIPPED | OUTPATIENT
Start: 2022-06-01

## 2022-06-01 RX ORDER — LOSARTAN POTASSIUM 50 MG/1
TABLET ORAL
COMMUNITY
Start: 2022-01-18

## 2022-06-01 RX ORDER — BENZONATATE 200 MG/1
200 CAPSULE ORAL 3 TIMES DAILY PRN
Qty: 15 CAPSULE | Refills: 0 | Status: SHIPPED | OUTPATIENT
Start: 2022-06-01 | End: 2022-06-06

## 2022-07-07 ENCOUNTER — TELEPHONE (OUTPATIENT)
Dept: OBGYN CLINIC | Facility: CLINIC | Age: 57
End: 2022-07-07

## 2022-07-07 DIAGNOSIS — R10.2 PELVIC CRAMPING: Primary | ICD-10-CM

## 2022-07-07 NOTE — TELEPHONE ENCOUNTER
Patient has a lump under left arm, please [facility] [phone][facility]

## 2022-07-07 NOTE — TELEPHONE ENCOUNTER
Patient states she felt lump under left arm and went to PCP, was given antibiotic. She states she will watch for resolution. She is complaining of cramping and low back pain x a few days. No bleeding. Denies urinary urgency, burning, odor or cloudy/bloody urine. Admits to frequency, but typical for her. Ordered UA to r/o UTI. Scheduled 7/22/22 in res 24 slot. Advised if + UTI can cancel appt. Patient verbalized understanding.      Await UA

## 2022-07-08 ENCOUNTER — LAB ENCOUNTER (OUTPATIENT)
Dept: LAB | Facility: HOSPITAL | Age: 57
End: 2022-07-08
Attending: OBSTETRICS & GYNECOLOGY
Payer: COMMERCIAL

## 2022-07-08 DIAGNOSIS — R10.2 PELVIC CRAMPING: ICD-10-CM

## 2022-07-08 LAB
BILIRUB UR QL: NEGATIVE
CLARITY UR: CLEAR
COLOR UR: YELLOW
GLUCOSE UR-MCNC: NEGATIVE MG/DL
KETONES UR-MCNC: NEGATIVE MG/DL
NITRITE UR QL STRIP.AUTO: NEGATIVE
PH UR: 6 [PH] (ref 5–8)
PROT UR-MCNC: NEGATIVE MG/DL
SP GR UR STRIP: 1.02 (ref 1–1.03)
UROBILINOGEN UR STRIP-ACNC: 0.2

## 2022-07-08 PROCEDURE — 81001 URINALYSIS AUTO W/SCOPE: CPT

## 2022-07-08 PROCEDURE — 87086 URINE CULTURE/COLONY COUNT: CPT

## 2022-07-08 PROCEDURE — 81015 MICROSCOPIC EXAM OF URINE: CPT

## 2022-07-11 NOTE — TELEPHONE ENCOUNTER
Informed pt of negative UA and culture. Pt reports still has cramping and lower back pain. Advised pt to keep appt on 7/22 for exam and okay to take ibuprofen or tylenol.

## 2022-07-22 ENCOUNTER — OFFICE VISIT (OUTPATIENT)
Dept: OBGYN CLINIC | Facility: CLINIC | Age: 57
End: 2022-07-22
Payer: COMMERCIAL

## 2022-07-22 VITALS
HEART RATE: 71 BPM | DIASTOLIC BLOOD PRESSURE: 92 MMHG | SYSTOLIC BLOOD PRESSURE: 137 MMHG | BODY MASS INDEX: 24 KG/M2 | WEIGHT: 121 LBS

## 2022-07-22 DIAGNOSIS — R10.9 ABDOMINAL BLOATING WITH CRAMPS: Primary | ICD-10-CM

## 2022-07-22 DIAGNOSIS — R14.0 ABDOMINAL BLOATING WITH CRAMPS: Primary | ICD-10-CM

## 2022-07-22 DIAGNOSIS — R10.2 PELVIC PAIN: ICD-10-CM

## 2022-07-22 PROCEDURE — 3080F DIAST BP >= 90 MM HG: CPT | Performed by: OBSTETRICS & GYNECOLOGY

## 2022-07-22 PROCEDURE — 99213 OFFICE O/P EST LOW 20 MIN: CPT | Performed by: OBSTETRICS & GYNECOLOGY

## 2022-07-22 PROCEDURE — 3075F SYST BP GE 130 - 139MM HG: CPT | Performed by: OBSTETRICS & GYNECOLOGY

## 2022-08-17 ENCOUNTER — HOSPITAL ENCOUNTER (OUTPATIENT)
Dept: ULTRASOUND IMAGING | Age: 57
Discharge: HOME | End: 2022-08-17
Attending: OBSTETRICS & GYNECOLOGY
Payer: COMMERCIAL

## 2022-08-17 DIAGNOSIS — R14.0 ABDOMINAL BLOATING WITH CRAMPS: ICD-10-CM

## 2022-08-17 DIAGNOSIS — R10.2 PELVIC PAIN: ICD-10-CM

## 2022-08-17 DIAGNOSIS — R10.9 ABDOMINAL BLOATING WITH CRAMPS: ICD-10-CM

## 2022-08-17 PROCEDURE — 76830 TRANSVAGINAL US NON-OB: CPT | Performed by: OBSTETRICS & GYNECOLOGY

## 2022-08-17 PROCEDURE — 76856 US EXAM PELVIC COMPLETE: CPT | Performed by: OBSTETRICS & GYNECOLOGY

## 2022-08-31 ENCOUNTER — APPOINTMENT (OUTPATIENT)
Dept: URBAN - METROPOLITAN AREA CLINIC 244 | Age: 57
Setting detail: DERMATOLOGY
End: 2022-09-01

## 2022-08-31 DIAGNOSIS — L81.4 OTHER MELANIN HYPERPIGMENTATION: ICD-10-CM

## 2022-08-31 DIAGNOSIS — D22 MELANOCYTIC NEVI: ICD-10-CM

## 2022-08-31 DIAGNOSIS — L82.1 OTHER SEBORRHEIC KERATOSIS: ICD-10-CM

## 2022-08-31 PROBLEM — D22.5 MELANOCYTIC NEVI OF TRUNK: Status: ACTIVE | Noted: 2022-08-31

## 2022-08-31 PROCEDURE — OTHER COUNSELING: OTHER

## 2022-08-31 PROCEDURE — 99213 OFFICE O/P EST LOW 20 MIN: CPT

## 2022-08-31 ASSESSMENT — LOCATION SIMPLE DESCRIPTION DERM
LOCATION SIMPLE: LEFT UPPER BACK
LOCATION SIMPLE: RIGHT UPPER BACK
LOCATION SIMPLE: CHEST

## 2022-08-31 ASSESSMENT — LOCATION DETAILED DESCRIPTION DERM
LOCATION DETAILED: RIGHT SUPERIOR MEDIAL UPPER BACK
LOCATION DETAILED: UPPER STERNUM
LOCATION DETAILED: LEFT MEDIAL UPPER BACK

## 2022-08-31 ASSESSMENT — LOCATION ZONE DERM: LOCATION ZONE: TRUNK

## 2022-11-21 ENCOUNTER — HOSPITAL ENCOUNTER (OUTPATIENT)
Age: 57
Discharge: HOME | End: 2022-11-21
Payer: COMMERCIAL

## 2022-11-21 VITALS
DIASTOLIC BLOOD PRESSURE: 85 MMHG | TEMPERATURE: 98 F | HEART RATE: 77 BPM | OXYGEN SATURATION: 98 % | SYSTOLIC BLOOD PRESSURE: 137 MMHG | RESPIRATION RATE: 16 BRPM

## 2022-11-21 DIAGNOSIS — R22.31 LUMP OF AXILLA, RIGHT: ICD-10-CM

## 2022-11-21 DIAGNOSIS — L73.9 FOLLICULITIS: Primary | ICD-10-CM

## 2022-11-21 PROCEDURE — 99213 OFFICE O/P EST LOW 20 MIN: CPT | Performed by: NURSE PRACTITIONER

## 2022-11-21 NOTE — DISCHARGE INSTRUCTIONS
Start the topical antibiotic ointment under the left underarm as discussed. Warm compress underneath the right underarm as you may be developing an abscess or it could be a freely movable lymph node. If you develop pus beneath the skin or any drainage from the skin that may need to be cut open and drain follow-up with your primary care doctor or return to the [facility]. If you develop fevers or chills skin is red hot to touch with or without drainage return to [facility] or go to the emergency department. Warm compress to the right axillary area 2-3 times per day. Take Tylenol or Motrin for any pain or discomfort. Avoid shaving the underarms.

## 2022-12-28 ENCOUNTER — OFFICE VISIT (OUTPATIENT)
Dept: RHEUMATOLOGY | Facility: CLINIC | Age: 57
End: 2022-12-28
Payer: COMMERCIAL

## 2022-12-28 ENCOUNTER — TELEPHONE (OUTPATIENT)
Dept: RHEUMATOLOGY | Facility: CLINIC | Age: 57
End: 2022-12-28

## 2022-12-28 VITALS
WEIGHT: 124 LBS | HEIGHT: 60 IN | HEART RATE: 69 BPM | RESPIRATION RATE: 16 BRPM | SYSTOLIC BLOOD PRESSURE: 148 MMHG | DIASTOLIC BLOOD PRESSURE: 95 MMHG | BODY MASS INDEX: 24.35 KG/M2

## 2022-12-28 DIAGNOSIS — M54.50 CHRONIC BILATERAL LOW BACK PAIN WITHOUT SCIATICA: Primary | ICD-10-CM

## 2022-12-28 DIAGNOSIS — M79.10 MYALGIA: ICD-10-CM

## 2022-12-28 DIAGNOSIS — G89.29 CHRONIC BILATERAL LOW BACK PAIN WITHOUT SCIATICA: Primary | ICD-10-CM

## 2022-12-28 DIAGNOSIS — R76.8 POSITIVE ANA (ANTINUCLEAR ANTIBODY): ICD-10-CM

## 2022-12-28 PROCEDURE — 3077F SYST BP >= 140 MM HG: CPT | Performed by: INTERNAL MEDICINE

## 2022-12-28 PROCEDURE — 3008F BODY MASS INDEX DOCD: CPT | Performed by: INTERNAL MEDICINE

## 2022-12-28 PROCEDURE — 3080F DIAST BP >= 90 MM HG: CPT | Performed by: INTERNAL MEDICINE

## 2022-12-28 PROCEDURE — 99214 OFFICE O/P EST MOD 30 MIN: CPT | Performed by: INTERNAL MEDICINE

## 2022-12-28 RX ORDER — IBUPROFEN 200 MG
200 TABLET ORAL EVERY 6 HOURS PRN
COMMUNITY

## 2022-12-28 RX ORDER — LORAZEPAM 0.5 MG/1
0.5 TABLET ORAL ONCE
Qty: 1 TABLET | Refills: 0 | Status: SHIPPED | OUTPATIENT
Start: 2022-12-28 | End: 2022-12-28

## 2022-12-28 RX ORDER — MELOXICAM 15 MG/1
15 TABLET ORAL DAILY
Qty: 30 TABLET | Refills: 1 | Status: SHIPPED | OUTPATIENT
Start: 2022-12-28

## 2022-12-28 NOTE — TELEPHONE ENCOUNTER
Plan for mri lumbar spine- will need PA   For  Chronic lower back pain -   Has done PT and now doing home exercies

## 2022-12-28 NOTE — PATIENT INSTRUCTIONS
1. Check mri lumbar spine   2. Check labs  and check si joint xray and lumbar xray   3. Try meloxicam 15mg a day - take with food   4. Voltaren gel 1 % topical   5.return to clinic in 3 weeks.

## 2022-12-29 ENCOUNTER — HOSPITAL ENCOUNTER (OUTPATIENT)
Dept: GENERAL RADIOLOGY | Facility: HOSPITAL | Age: 57
Discharge: HOME | End: 2022-12-29
Attending: INTERNAL MEDICINE
Payer: COMMERCIAL

## 2022-12-29 DIAGNOSIS — M54.50 CHRONIC BILATERAL LOW BACK PAIN WITHOUT SCIATICA: ICD-10-CM

## 2022-12-29 DIAGNOSIS — G89.29 CHRONIC BILATERAL LOW BACK PAIN WITHOUT SCIATICA: ICD-10-CM

## 2022-12-29 PROCEDURE — 72100 X-RAY EXAM L-S SPINE 2/3 VWS: CPT | Performed by: INTERNAL MEDICINE

## 2022-12-29 PROCEDURE — 72202 X-RAY EXAM SI JOINTS 3/> VWS: CPT | Performed by: INTERNAL MEDICINE

## 2022-12-30 ENCOUNTER — LAB ENCOUNTER (OUTPATIENT)
Dept: LAB | Facility: HOSPITAL | Age: 57
End: 2022-12-30
Attending: INTERNAL MEDICINE
Payer: COMMERCIAL

## 2022-12-30 DIAGNOSIS — G89.29 CHRONIC BILATERAL LOW BACK PAIN WITHOUT SCIATICA: ICD-10-CM

## 2022-12-30 DIAGNOSIS — M54.50 CHRONIC BILATERAL LOW BACK PAIN WITHOUT SCIATICA: ICD-10-CM

## 2022-12-30 LAB
ALBUMIN SERPL-MCNC: 3.6 G/DL (ref 3.4–5)
ALBUMIN/GLOB SERPL: 0.9 {RATIO} (ref 1–2)
ALP LIVER SERPL-CCNC: 71 U/L
ALT SERPL-CCNC: 26 U/L
ANION GAP SERPL CALC-SCNC: 6 MMOL/L (ref 0–18)
AST SERPL-CCNC: 21 U/L (ref 15–37)
BILIRUB SERPL-MCNC: 0.7 MG/DL (ref 0.1–2)
BUN BLD-MCNC: 32 MG/DL (ref 7–18)
BUN/CREAT SERPL: 36 (ref 10–20)
CALCIUM BLD-MCNC: 9.2 MG/DL (ref 8.5–10.1)
CHLORIDE SERPL-SCNC: 106 MMOL/L (ref 98–112)
CO2 SERPL-SCNC: 29 MMOL/L (ref 21–32)
CREAT BLD-MCNC: 0.89 MG/DL
CRP SERPL-MCNC: <0.29 MG/DL (ref ?–0.3)
ERYTHROCYTE [SEDIMENTATION RATE] IN BLOOD: 12 MM/HR
FASTING STATUS PATIENT QL REPORTED: NO
GFR SERPLBLD BASED ON 1.73 SQ M-ARVRAT: 75 ML/MIN/1.73M2 (ref 60–?)
GLOBULIN PLAS-MCNC: 3.9 G/DL (ref 2.8–4.4)
GLUCOSE BLD-MCNC: 73 MG/DL (ref 70–99)
OSMOLALITY SERPL CALC.SUM OF ELEC: 297 MOSM/KG (ref 275–295)
POTASSIUM SERPL-SCNC: 4.1 MMOL/L (ref 3.5–5.1)
PROT SERPL-MCNC: 7.5 G/DL (ref 6.4–8.2)
SODIUM SERPL-SCNC: 141 MMOL/L (ref 136–145)

## 2022-12-30 PROCEDURE — 86140 C-REACTIVE PROTEIN: CPT

## 2022-12-30 PROCEDURE — 36415 COLL VENOUS BLD VENIPUNCTURE: CPT

## 2022-12-30 PROCEDURE — 85652 RBC SED RATE AUTOMATED: CPT

## 2022-12-30 PROCEDURE — 80053 COMPREHEN METABOLIC PANEL: CPT

## 2023-01-06 ENCOUNTER — HOSPITAL ENCOUNTER (OUTPATIENT)
Dept: MRI IMAGING | Age: 58
Discharge: HOME | End: 2023-01-06
Attending: INTERNAL MEDICINE
Payer: COMMERCIAL

## 2023-01-06 DIAGNOSIS — M54.50 CHRONIC BILATERAL LOW BACK PAIN WITHOUT SCIATICA: ICD-10-CM

## 2023-01-06 DIAGNOSIS — G89.29 CHRONIC BILATERAL LOW BACK PAIN WITHOUT SCIATICA: ICD-10-CM

## 2023-01-06 PROCEDURE — 72148 MRI LUMBAR SPINE W/O DYE: CPT | Performed by: INTERNAL MEDICINE

## 2023-01-10 ENCOUNTER — OFFICE VISIT (OUTPATIENT)
Dept: OBGYN CLINIC | Facility: CLINIC | Age: 58
End: 2023-01-10
Payer: COMMERCIAL

## 2023-01-10 VITALS — HEART RATE: 69 BPM | SYSTOLIC BLOOD PRESSURE: 148 MMHG | DIASTOLIC BLOOD PRESSURE: 92 MMHG

## 2023-01-10 DIAGNOSIS — Z12.31 SCREENING MAMMOGRAM FOR BREAST CANCER: ICD-10-CM

## 2023-01-10 DIAGNOSIS — Z01.419 ENCOUNTER FOR GYNECOLOGICAL EXAMINATION WITHOUT ABNORMAL FINDING: Primary | ICD-10-CM

## 2023-01-10 PROCEDURE — 3077F SYST BP >= 140 MM HG: CPT | Performed by: OBSTETRICS & GYNECOLOGY

## 2023-01-10 PROCEDURE — 3080F DIAST BP >= 90 MM HG: CPT | Performed by: OBSTETRICS & GYNECOLOGY

## 2023-01-10 PROCEDURE — 99396 PREV VISIT EST AGE 40-64: CPT | Performed by: OBSTETRICS & GYNECOLOGY

## 2023-01-12 ENCOUNTER — TELEPHONE (OUTPATIENT)
Dept: RHEUMATOLOGY | Facility: CLINIC | Age: 58
End: 2023-01-12

## 2023-01-12 ENCOUNTER — APPOINTMENT (OUTPATIENT)
Dept: URBAN - METROPOLITAN AREA CLINIC 244 | Age: 58
Setting detail: DERMATOLOGY
End: 2023-01-13

## 2023-01-12 ENCOUNTER — PATIENT MESSAGE (OUTPATIENT)
Dept: RHEUMATOLOGY | Facility: CLINIC | Age: 58
End: 2023-01-12

## 2023-01-12 DIAGNOSIS — D22 MELANOCYTIC NEVI: ICD-10-CM

## 2023-01-12 PROBLEM — D22.39 MELANOCYTIC NEVI OF OTHER PARTS OF FACE: Status: ACTIVE | Noted: 2023-01-12

## 2023-01-12 PROCEDURE — OTHER COUNSELING: OTHER

## 2023-01-12 PROCEDURE — 99212 OFFICE O/P EST SF 10 MIN: CPT

## 2023-01-12 PROCEDURE — OTHER OBSERVATION AND MEASURE: OTHER

## 2023-01-12 PROCEDURE — OTHER OBSERVATION: OTHER

## 2023-01-12 ASSESSMENT — LOCATION ZONE DERM: LOCATION ZONE: FACE

## 2023-01-12 ASSESSMENT — LOCATION DETAILED DESCRIPTION DERM: LOCATION DETAILED: LEFT SUPERIOR CENTRAL BUCCAL CHEEK

## 2023-01-12 ASSESSMENT — LOCATION SIMPLE DESCRIPTION DERM: LOCATION SIMPLE: LEFT CHEEK

## 2023-01-13 NOTE — TELEPHONE ENCOUNTER
From: Nathanael Wills  To: Marvin Petersen MD  Sent: 1/12/2023 8:23 PM CST  Subject: Test Results    Good Evening,    Should I make a follow up appointment with you or an orthopedist regarding the slight impingement on the nerve? Thank you for all of your follow up regarding my back pain, it has been a pleasure working with you.

## 2023-01-13 NOTE — TELEPHONE ENCOUNTER
Talked to pt. About her mri -   She has seen orthopedics doctors - ortho spine in the past - and to the pain management with the mri - to see if they can do more injections versus surgery - she saids she didn't want surgery.    Told her to get a copy of the mri for their office

## 2023-02-22 DIAGNOSIS — M54.50 CHRONIC BILATERAL LOW BACK PAIN WITHOUT SCIATICA: ICD-10-CM

## 2023-02-22 DIAGNOSIS — G89.29 CHRONIC BILATERAL LOW BACK PAIN WITHOUT SCIATICA: ICD-10-CM

## 2023-02-22 RX ORDER — MELOXICAM 15 MG/1
15 TABLET ORAL DAILY
Qty: 30 TABLET | Refills: 1 | Status: SHIPPED | OUTPATIENT
Start: 2023-02-22

## 2023-03-13 ENCOUNTER — OFFICE VISIT (OUTPATIENT)
Dept: FAMILY MEDICINE CLINIC | Facility: CLINIC | Age: 58
End: 2023-03-13
Payer: COMMERCIAL

## 2023-03-13 VITALS
SYSTOLIC BLOOD PRESSURE: 166 MMHG | TEMPERATURE: 98 F | RESPIRATION RATE: 16 BRPM | DIASTOLIC BLOOD PRESSURE: 88 MMHG | OXYGEN SATURATION: 99 % | BODY MASS INDEX: 24.35 KG/M2 | HEIGHT: 60 IN | WEIGHT: 124 LBS | HEART RATE: 66 BPM

## 2023-03-13 DIAGNOSIS — J31.0 RHINOSINUSITIS: Primary | ICD-10-CM

## 2023-03-13 DIAGNOSIS — I10 ELEVATED BLOOD PRESSURE READING IN OFFICE WITH DIAGNOSIS OF HYPERTENSION: ICD-10-CM

## 2023-03-13 DIAGNOSIS — J32.9 RHINOSINUSITIS: Primary | ICD-10-CM

## 2023-03-13 RX ORDER — AMOXICILLIN AND CLAVULANATE POTASSIUM 875; 125 MG/1; MG/1
1 TABLET, FILM COATED ORAL 2 TIMES DAILY
Qty: 14 TABLET | Refills: 0 | Status: SHIPPED | OUTPATIENT
Start: 2023-03-13 | End: 2023-03-20

## 2023-03-14 ENCOUNTER — HOSPITAL ENCOUNTER (OUTPATIENT)
Dept: MAMMOGRAPHY | Age: 58
Discharge: HOME | End: 2023-03-14
Attending: OBSTETRICS & GYNECOLOGY
Payer: COMMERCIAL

## 2023-03-14 DIAGNOSIS — Z12.31 SCREENING MAMMOGRAM FOR BREAST CANCER: ICD-10-CM

## 2023-03-14 PROCEDURE — 77067 SCR MAMMO BI INCL CAD: CPT | Performed by: OBSTETRICS & GYNECOLOGY

## 2023-03-14 PROCEDURE — 77063 BREAST TOMOSYNTHESIS BI: CPT | Performed by: OBSTETRICS & GYNECOLOGY

## 2023-04-07 ENCOUNTER — OFFICE VISIT (OUTPATIENT)
Dept: FAMILY MEDICINE CLINIC | Facility: CLINIC | Age: 58
End: 2023-04-07
Payer: COMMERCIAL

## 2023-04-07 VITALS
SYSTOLIC BLOOD PRESSURE: 173 MMHG | HEART RATE: 62 BPM | WEIGHT: 120 LBS | OXYGEN SATURATION: 99 % | TEMPERATURE: 97 F | BODY MASS INDEX: 23.56 KG/M2 | RESPIRATION RATE: 18 BRPM | DIASTOLIC BLOOD PRESSURE: 95 MMHG | HEIGHT: 60 IN

## 2023-04-07 DIAGNOSIS — R19.8 PEPTIC ULCER SYMPTOMS: Primary | ICD-10-CM

## 2023-04-07 DIAGNOSIS — I10 ELEVATED BLOOD PRESSURE READING IN OFFICE WITH DIAGNOSIS OF HYPERTENSION: ICD-10-CM

## 2023-04-07 PROCEDURE — 99213 OFFICE O/P EST LOW 20 MIN: CPT

## 2023-04-07 PROCEDURE — 3080F DIAST BP >= 90 MM HG: CPT

## 2023-04-07 PROCEDURE — 3008F BODY MASS INDEX DOCD: CPT

## 2023-04-07 PROCEDURE — 3077F SYST BP >= 140 MM HG: CPT

## 2023-08-29 ENCOUNTER — OFFICE VISIT (OUTPATIENT)
Dept: ENDOCRINOLOGY CLINIC | Facility: CLINIC | Age: 58
End: 2023-08-29

## 2023-08-29 VITALS
HEART RATE: 69 BPM | BODY MASS INDEX: 24.19 KG/M2 | WEIGHT: 123.19 LBS | SYSTOLIC BLOOD PRESSURE: 151 MMHG | HEIGHT: 60 IN | DIASTOLIC BLOOD PRESSURE: 93 MMHG

## 2023-08-29 DIAGNOSIS — Z13.29 THYROID DISORDER SCREENING: Primary | ICD-10-CM

## 2023-08-29 DIAGNOSIS — R63.5 WEIGHT GAIN: ICD-10-CM

## 2023-08-29 PROCEDURE — 3080F DIAST BP >= 90 MM HG: CPT | Performed by: INTERNAL MEDICINE

## 2023-08-29 PROCEDURE — 3008F BODY MASS INDEX DOCD: CPT | Performed by: INTERNAL MEDICINE

## 2023-08-29 PROCEDURE — 99203 OFFICE O/P NEW LOW 30 MIN: CPT | Performed by: INTERNAL MEDICINE

## 2023-08-29 PROCEDURE — 3077F SYST BP >= 140 MM HG: CPT | Performed by: INTERNAL MEDICINE

## 2023-08-31 ENCOUNTER — APPOINTMENT (OUTPATIENT)
Dept: URBAN - METROPOLITAN AREA CLINIC 244 | Age: 58
Setting detail: DERMATOLOGY
End: 2023-09-02

## 2023-08-31 DIAGNOSIS — D22 MELANOCYTIC NEVI: ICD-10-CM

## 2023-08-31 DIAGNOSIS — L81.4 OTHER MELANIN HYPERPIGMENTATION: ICD-10-CM

## 2023-08-31 DIAGNOSIS — L82.1 OTHER SEBORRHEIC KERATOSIS: ICD-10-CM

## 2023-08-31 PROBLEM — D22.5 MELANOCYTIC NEVI OF TRUNK: Status: ACTIVE | Noted: 2023-08-31

## 2023-08-31 PROCEDURE — 99213 OFFICE O/P EST LOW 20 MIN: CPT

## 2023-08-31 PROCEDURE — OTHER COUNSELING: OTHER

## 2023-08-31 ASSESSMENT — LOCATION SIMPLE DESCRIPTION DERM: LOCATION SIMPLE: ABDOMEN

## 2023-08-31 ASSESSMENT — LOCATION ZONE DERM: LOCATION ZONE: TRUNK

## 2023-08-31 ASSESSMENT — LOCATION DETAILED DESCRIPTION DERM: LOCATION DETAILED: PERIUMBILICAL SKIN

## 2023-08-31 NOTE — HPI: FULL BODY SKIN EXAMINATION
How Severe Are Your Spot(S)?: mild
What Is The Reason For Today's Visit?: Annual Full Body Skin Examination with No Concerns
What Is The Reason For Today's Visit? (Being Monitored For X): concerning skin lesions on a periodic basis
normal

## 2023-09-05 ENCOUNTER — LAB ENCOUNTER (OUTPATIENT)
Dept: LAB | Facility: HOSPITAL | Age: 58
End: 2023-09-05
Attending: INTERNAL MEDICINE
Payer: COMMERCIAL

## 2023-09-05 DIAGNOSIS — R63.5 WEIGHT GAIN: ICD-10-CM

## 2023-09-05 DIAGNOSIS — Z13.29 THYROID DISORDER SCREENING: ICD-10-CM

## 2023-09-05 LAB
CORTIS SERPL-MCNC: 18.5 UG/DL
TSI SER-ACNC: 2.31 MIU/ML (ref 0.36–3.74)

## 2023-09-05 PROCEDURE — 36415 COLL VENOUS BLD VENIPUNCTURE: CPT

## 2023-09-05 PROCEDURE — 84443 ASSAY THYROID STIM HORMONE: CPT

## 2023-09-05 PROCEDURE — 82533 TOTAL CORTISOL: CPT

## 2023-11-08 ENCOUNTER — TELEPHONE (OUTPATIENT)
Dept: FAMILY MEDICINE CLINIC | Facility: CLINIC | Age: 58
End: 2023-11-08

## 2023-11-08 ENCOUNTER — OFFICE VISIT (OUTPATIENT)
Dept: FAMILY MEDICINE CLINIC | Facility: CLINIC | Age: 58
End: 2023-11-08
Payer: COMMERCIAL

## 2023-11-08 ENCOUNTER — HOSPITAL ENCOUNTER (OUTPATIENT)
Dept: GENERAL RADIOLOGY | Facility: HOSPITAL | Age: 58
Discharge: HOME | End: 2023-11-08
Attending: FAMILY MEDICINE
Payer: COMMERCIAL

## 2023-11-08 ENCOUNTER — LAB ENCOUNTER (OUTPATIENT)
Dept: LAB | Facility: HOSPITAL | Age: 58
End: 2023-11-08
Attending: FAMILY MEDICINE
Payer: COMMERCIAL

## 2023-11-08 VITALS
OXYGEN SATURATION: 98 % | RESPIRATION RATE: 16 BRPM | TEMPERATURE: 97 F | SYSTOLIC BLOOD PRESSURE: 152 MMHG | WEIGHT: 124 LBS | DIASTOLIC BLOOD PRESSURE: 82 MMHG | HEART RATE: 80 BPM | BODY MASS INDEX: 24.35 KG/M2 | HEIGHT: 60 IN

## 2023-11-08 DIAGNOSIS — Z86.73 HISTORY OF CVA (CEREBROVASCULAR ACCIDENT): ICD-10-CM

## 2023-11-08 DIAGNOSIS — Z01.818 PREOPERATIVE EXAMINATION, UNSPECIFIED: Primary | ICD-10-CM

## 2023-11-08 DIAGNOSIS — M54.16 RIGHT LUMBAR RADICULITIS: ICD-10-CM

## 2023-11-08 DIAGNOSIS — Z01.818 PREOPERATIVE EXAMINATION, UNSPECIFIED: ICD-10-CM

## 2023-11-08 DIAGNOSIS — Z01.812 PRE-OPERATIVE LABORATORY EXAMINATION: ICD-10-CM

## 2023-11-08 LAB
ALBUMIN SERPL-MCNC: 4.2 G/DL (ref 3.2–4.8)
ALBUMIN/GLOB SERPL: 1.4 {RATIO} (ref 1–2)
ALP LIVER SERPL-CCNC: 59 U/L
ALT SERPL-CCNC: 19 U/L
ANION GAP SERPL CALC-SCNC: 4 MMOL/L (ref 0–18)
ANTIBODY SCREEN: NEGATIVE
APTT PPP: 27.5 SECONDS (ref 23.3–35.6)
AST SERPL-CCNC: 23 U/L (ref ?–34)
ATRIAL RATE: 60 BPM
BASOPHILS # BLD AUTO: 0.1 X10(3) UL (ref 0–0.2)
BASOPHILS NFR BLD AUTO: 1.3 %
BILIRUB SERPL-MCNC: 0.6 MG/DL (ref 0.2–1.1)
BILIRUB UR QL: NEGATIVE
BUN BLD-MCNC: 26 MG/DL (ref 9–23)
BUN/CREAT SERPL: 28.9 (ref 10–20)
CALCIUM BLD-MCNC: 9.5 MG/DL (ref 8.7–10.4)
CHLORIDE SERPL-SCNC: 110 MMOL/L (ref 98–112)
CLARITY UR: CLEAR
CO2 SERPL-SCNC: 28 MMOL/L (ref 21–32)
COLOR UR: YELLOW
CREAT BLD-MCNC: 0.9 MG/DL
DEPRECATED RDW RBC AUTO: 47.5 FL (ref 35.1–46.3)
EGFRCR SERPLBLD CKD-EPI 2021: 73 ML/MIN/1.73M2 (ref 60–?)
EOSINOPHIL # BLD AUTO: 0.27 X10(3) UL (ref 0–0.7)
EOSINOPHIL NFR BLD AUTO: 3.6 %
ERYTHROCYTE [DISTWIDTH] IN BLOOD BY AUTOMATED COUNT: 13.9 % (ref 11–15)
FASTING STATUS PATIENT QL REPORTED: NO
GLOBULIN PLAS-MCNC: 2.9 G/DL (ref 2.8–4.4)
GLUCOSE BLD-MCNC: 89 MG/DL (ref 70–99)
GLUCOSE UR-MCNC: NORMAL MG/DL
HCT VFR BLD AUTO: 42.4 %
HGB BLD-MCNC: 14.2 G/DL
HGB UR QL STRIP.AUTO: NEGATIVE
IMM GRANULOCYTES # BLD AUTO: 0.02 X10(3) UL (ref 0–1)
IMM GRANULOCYTES NFR BLD: 0.3 %
INR BLD: 0.92 (ref 0.8–1.2)
KETONES UR-MCNC: NEGATIVE MG/DL
LEUKOCYTE ESTERASE UR QL STRIP.AUTO: NEGATIVE
LYMPHOCYTES # BLD AUTO: 2.24 X10(3) UL (ref 1–4)
LYMPHOCYTES NFR BLD AUTO: 29.6 %
MCH RBC QN AUTO: 31.3 PG (ref 26–34)
MCHC RBC AUTO-ENTMCNC: 33.5 G/DL (ref 31–37)
MCV RBC AUTO: 93.4 FL
MONOCYTES # BLD AUTO: 0.71 X10(3) UL (ref 0.1–1)
MONOCYTES NFR BLD AUTO: 9.4 %
NEUTROPHILS # BLD AUTO: 4.24 X10 (3) UL (ref 1.5–7.7)
NEUTROPHILS # BLD AUTO: 4.24 X10(3) UL (ref 1.5–7.7)
NEUTROPHILS NFR BLD AUTO: 55.8 %
NITRITE UR QL STRIP.AUTO: NEGATIVE
OSMOLALITY SERPL CALC.SUM OF ELEC: 298 MOSM/KG (ref 275–295)
P AXIS: 6 DEGREES
P-R INTERVAL: 174 MS
PH UR: 6 [PH] (ref 5–8)
PLATELET # BLD AUTO: 172 10(3)UL (ref 150–450)
POTASSIUM SERPL-SCNC: 4.3 MMOL/L (ref 3.5–5.1)
PROT SERPL-MCNC: 7.1 G/DL (ref 5.7–8.2)
PROT UR-MCNC: NEGATIVE MG/DL
PROTHROMBIN TIME: 12.9 SECONDS (ref 11.6–14.8)
Q-T INTERVAL: 408 MS
QRS DURATION: 82 MS
QTC CALCULATION (BEZET): 408 MS
R AXIS: 9 DEGREES
RBC # BLD AUTO: 4.54 X10(6)UL
RH BLOOD TYPE: POSITIVE
RH BLOOD TYPE: POSITIVE
SODIUM SERPL-SCNC: 142 MMOL/L (ref 136–145)
SP GR UR STRIP: 1.02 (ref 1–1.03)
T AXIS: 30 DEGREES
UROBILINOGEN UR STRIP-ACNC: NORMAL
VENTRICULAR RATE: 60 BPM
WBC # BLD AUTO: 7.6 X10(3) UL (ref 4–11)

## 2023-11-08 PROCEDURE — 85730 THROMBOPLASTIN TIME PARTIAL: CPT

## 2023-11-08 PROCEDURE — 3077F SYST BP >= 140 MM HG: CPT | Performed by: FAMILY MEDICINE

## 2023-11-08 PROCEDURE — 3079F DIAST BP 80-89 MM HG: CPT | Performed by: FAMILY MEDICINE

## 2023-11-08 PROCEDURE — 81003 URINALYSIS AUTO W/O SCOPE: CPT

## 2023-11-08 PROCEDURE — 71046 X-RAY EXAM CHEST 2 VIEWS: CPT | Performed by: FAMILY MEDICINE

## 2023-11-08 PROCEDURE — 86850 RBC ANTIBODY SCREEN: CPT | Performed by: FAMILY MEDICINE

## 2023-11-08 PROCEDURE — 87147 CULTURE TYPE IMMUNOLOGIC: CPT

## 2023-11-08 PROCEDURE — 99213 OFFICE O/P EST LOW 20 MIN: CPT | Performed by: FAMILY MEDICINE

## 2023-11-08 PROCEDURE — 86901 BLOOD TYPING SEROLOGIC RH(D): CPT | Performed by: FAMILY MEDICINE

## 2023-11-08 PROCEDURE — 36415 COLL VENOUS BLD VENIPUNCTURE: CPT | Performed by: FAMILY MEDICINE

## 2023-11-08 PROCEDURE — 87081 CULTURE SCREEN ONLY: CPT

## 2023-11-08 PROCEDURE — 80053 COMPREHEN METABOLIC PANEL: CPT

## 2023-11-08 PROCEDURE — 85025 COMPLETE CBC W/AUTO DIFF WBC: CPT

## 2023-11-08 PROCEDURE — 85610 PROTHROMBIN TIME: CPT

## 2023-11-08 PROCEDURE — 93010 ELECTROCARDIOGRAM REPORT: CPT | Performed by: INTERNAL MEDICINE

## 2023-11-08 PROCEDURE — 3008F BODY MASS INDEX DOCD: CPT | Performed by: FAMILY MEDICINE

## 2023-11-08 PROCEDURE — 86900 BLOOD TYPING SEROLOGIC ABO: CPT | Performed by: FAMILY MEDICINE

## 2023-11-08 PROCEDURE — 93005 ELECTROCARDIOGRAM TRACING: CPT

## 2023-11-08 RX ORDER — SENNOSIDES 8.6 MG
650 CAPSULE ORAL EVERY 8 HOURS PRN
COMMUNITY

## 2023-11-08 RX ORDER — BUMETANIDE 0.5 MG/1
0.25 TABLET ORAL DAILY
COMMUNITY

## 2023-11-08 RX ORDER — MULTIVIT-MIN/IRON FUM/FOLIC AC 7.5 MG-4
1 TABLET ORAL DAILY
COMMUNITY

## 2023-11-08 NOTE — TELEPHONE ENCOUNTER
Right L4-5 Micro on 11/30/23 with Dr. Pete Romero @ [address] [place] [address]    H&P- completed 11/08/23  Labs- RDW-SD (47.5), BUN (26), BUN/Creat ratio (28.9), Calc Osmo (298), +MSSA, UA neg, all other labs WNL  EKG- WNL  X-ray- WNL    Cardio- Dr. Jeni Regalado Hx of HTN and Stroke- on ASA  Last seen: 09/07/23 in Encounters  P- [address]  F- [phone]    Neuro- Dr. Isma Black- stroke  P- [phone]  F- [phone]      Heme- Dr. Carter Flair - ITP- plts have been OK only go down when sick. S[place]ie [facility] [phone]  F- [phone]  Last seen: 09/15/22 in Encounters    -Gets bad H/A takes either ASA or Midol    Per JA no clearances needed. Per JA hold ASA for 7 days prior to surgery.

## 2023-11-08 NOTE — TELEPHONE ENCOUNTER
Dr. Haja Garcia, please review:    Labs- RDW-SD (47.5), BUN (26), BUN/Creat ratio (28.9), Calc Osmo (298), +MSSA, UA neg, all other labs WNL  EKG- WNL  X-ray- WNL    Per JA no clearances needed. Per JA hold ASA for 7 days prior to surgery. OK for surgery?

## 2023-11-09 PROBLEM — Z86.73 HISTORY OF CVA (CEREBROVASCULAR ACCIDENT): Status: ACTIVE | Noted: 2023-11-09

## 2023-11-09 PROBLEM — M54.16 RIGHT LUMBAR RADICULITIS: Status: ACTIVE | Noted: 2023-11-09

## 2023-11-10 NOTE — TELEPHONE ENCOUNTER
Chart and results reviewed and are acceptable for surgery. Pt is medically clear to proceed with surgery as planned. Hold aspirin for 7 days before and 7 days after surgery.

## 2023-11-13 NOTE — TELEPHONE ENCOUNTER
Spoke to patient, went over test results and ASA recs, let her know she is clear for surgery. Went over +MSSA and gave instructions on when/how to use Bactroban ointment to treat. Rx sent to pharmacy. Also, emailed Dr. Magdalena Baez office about her stopping Midol for pain if they can send something in to help her the week before surgery. Dr. Bev Peng, patient wants to double check about BP meds. Losartan hold day of surgery. Metoprolol OK to take day of surgery?

## 2023-11-15 NOTE — TELEPHONE ENCOUNTER
Spoke to patient, she will hold losartan the day before surgery and take Metoprolol the night before surgery as she usually does.

## 2023-11-16 ENCOUNTER — OFFICE VISIT (OUTPATIENT)
Dept: FAMILY MEDICINE CLINIC | Facility: CLINIC | Age: 58
End: 2023-11-16
Payer: COMMERCIAL

## 2023-11-16 VITALS
HEART RATE: 75 BPM | SYSTOLIC BLOOD PRESSURE: 128 MMHG | HEIGHT: 60 IN | OXYGEN SATURATION: 97 % | RESPIRATION RATE: 16 BRPM | TEMPERATURE: 98 F | DIASTOLIC BLOOD PRESSURE: 88 MMHG | BODY MASS INDEX: 23.75 KG/M2 | WEIGHT: 121 LBS

## 2023-11-16 DIAGNOSIS — J02.9 SORE THROAT: ICD-10-CM

## 2023-11-16 DIAGNOSIS — J01.00 ACUTE MAXILLARY SINUSITIS, RECURRENCE NOT SPECIFIED: Primary | ICD-10-CM

## 2023-11-16 LAB
CONTROL LINE PRESENT WITH A CLEAR BACKGROUND (YES/NO): YES YES/NO
KIT LOT #: NORMAL NUMERIC
STREP GRP A CUL-SCR: NEGATIVE

## 2023-11-16 PROCEDURE — 3079F DIAST BP 80-89 MM HG: CPT | Performed by: NURSE PRACTITIONER

## 2023-11-16 PROCEDURE — 3008F BODY MASS INDEX DOCD: CPT | Performed by: NURSE PRACTITIONER

## 2023-11-16 PROCEDURE — 3074F SYST BP LT 130 MM HG: CPT | Performed by: NURSE PRACTITIONER

## 2023-11-16 PROCEDURE — 87880 STREP A ASSAY W/OPTIC: CPT | Performed by: NURSE PRACTITIONER

## 2023-11-16 PROCEDURE — 99213 OFFICE O/P EST LOW 20 MIN: CPT | Performed by: NURSE PRACTITIONER

## 2023-11-16 RX ORDER — AMOXICILLIN AND CLAVULANATE POTASSIUM 875; 125 MG/1; MG/1
1 TABLET, FILM COATED ORAL 2 TIMES DAILY
Qty: 14 TABLET | Refills: 0 | Status: SHIPPED | OUTPATIENT
Start: 2023-11-16 | End: 2023-11-23

## 2023-11-17 NOTE — TELEPHONE ENCOUNTER
Patient went to Urgent Care and was given Augmentin BID x7 days for possible sinus infection. She started 11/16/23.

## 2023-11-27 RX ORDER — METOPROLOL SUCCINATE 50 MG/1
50 TABLET, EXTENDED RELEASE ORAL DAILY
COMMUNITY

## 2023-11-30 ENCOUNTER — HOSPITAL ENCOUNTER (OUTPATIENT)
Facility: HOSPITAL | Age: 58
Discharge: HOME | End: 2023-11-30
Attending: ORTHOPAEDIC SURGERY | Admitting: ORTHOPAEDIC SURGERY
Payer: COMMERCIAL

## 2023-11-30 ENCOUNTER — ANESTHESIA EVENT (OUTPATIENT)
Dept: SURGERY | Facility: HOSPITAL | Age: 58
End: 2023-11-30
Payer: COMMERCIAL

## 2023-11-30 ENCOUNTER — HOSPITAL ENCOUNTER (OUTPATIENT)
Facility: HOSPITAL | Age: 58
Setting detail: HOSPITAL OUTPATIENT SURGERY
Discharge: HOME | End: 2023-11-30
Attending: ORTHOPAEDIC SURGERY | Admitting: ORTHOPAEDIC SURGERY
Payer: COMMERCIAL

## 2023-11-30 ENCOUNTER — APPOINTMENT (OUTPATIENT)
Dept: GENERAL RADIOLOGY | Facility: HOSPITAL | Age: 58
End: 2023-11-30
Attending: ORTHOPAEDIC SURGERY
Payer: COMMERCIAL

## 2023-11-30 ENCOUNTER — ANESTHESIA (OUTPATIENT)
Dept: SURGERY | Facility: HOSPITAL | Age: 58
End: 2023-11-30
Payer: COMMERCIAL

## 2023-11-30 VITALS
HEIGHT: 60 IN | OXYGEN SATURATION: 100 % | WEIGHT: 124.13 LBS | SYSTOLIC BLOOD PRESSURE: 154 MMHG | DIASTOLIC BLOOD PRESSURE: 87 MMHG | TEMPERATURE: 98 F | BODY MASS INDEX: 24.37 KG/M2 | RESPIRATION RATE: 16 BRPM | HEART RATE: 56 BPM

## 2023-11-30 PROCEDURE — 76000 FLUOROSCOPY <1 HR PHYS/QHP: CPT | Performed by: ORTHOPAEDIC SURGERY

## 2023-11-30 PROCEDURE — 0SB20ZZ EXCISION OF LUMBAR VERTEBRAL DISC, OPEN APPROACH: ICD-10-PCS | Performed by: ORTHOPAEDIC SURGERY

## 2023-11-30 RX ORDER — SODIUM CHLORIDE, SODIUM LACTATE, POTASSIUM CHLORIDE, CALCIUM CHLORIDE 600; 310; 30; 20 MG/100ML; MG/100ML; MG/100ML; MG/100ML
INJECTION, SOLUTION INTRAVENOUS CONTINUOUS
Status: DISCONTINUED | OUTPATIENT
Start: 2023-11-30 | End: 2023-11-30

## 2023-11-30 RX ORDER — HYDROMORPHONE HYDROCHLORIDE 1 MG/ML
0.4 INJECTION, SOLUTION INTRAMUSCULAR; INTRAVENOUS; SUBCUTANEOUS EVERY 5 MIN PRN
Status: DISCONTINUED | OUTPATIENT
Start: 2023-11-30 | End: 2023-11-30

## 2023-11-30 RX ORDER — ACETAMINOPHEN 500 MG
1000 TABLET ORAL ONCE
Status: COMPLETED | OUTPATIENT
Start: 2023-11-30 | End: 2023-11-30

## 2023-11-30 RX ORDER — LIDOCAINE HYDROCHLORIDE 10 MG/ML
INJECTION, SOLUTION EPIDURAL; INFILTRATION; INTRACAUDAL; PERINEURAL AS NEEDED
Status: DISCONTINUED | OUTPATIENT
Start: 2023-11-30 | End: 2023-11-30 | Stop reason: SURG

## 2023-11-30 RX ORDER — MORPHINE SULFATE 4 MG/ML
4 INJECTION, SOLUTION INTRAMUSCULAR; INTRAVENOUS EVERY 10 MIN PRN
Status: DISCONTINUED | OUTPATIENT
Start: 2023-11-30 | End: 2023-11-30

## 2023-11-30 RX ORDER — GLYCOPYRROLATE 0.2 MG/ML
INJECTION, SOLUTION INTRAMUSCULAR; INTRAVENOUS AS NEEDED
Status: DISCONTINUED | OUTPATIENT
Start: 2023-11-30 | End: 2023-11-30 | Stop reason: SURG

## 2023-11-30 RX ORDER — ONDANSETRON 2 MG/ML
4 INJECTION INTRAMUSCULAR; INTRAVENOUS EVERY 6 HOURS PRN
Status: DISCONTINUED | OUTPATIENT
Start: 2023-11-30 | End: 2023-11-30

## 2023-11-30 RX ORDER — ONDANSETRON 2 MG/ML
INJECTION INTRAMUSCULAR; INTRAVENOUS AS NEEDED
Status: DISCONTINUED | OUTPATIENT
Start: 2023-11-30 | End: 2023-11-30 | Stop reason: SURG

## 2023-11-30 RX ORDER — NALOXONE HYDROCHLORIDE 0.4 MG/ML
80 INJECTION, SOLUTION INTRAMUSCULAR; INTRAVENOUS; SUBCUTANEOUS AS NEEDED
Status: DISCONTINUED | OUTPATIENT
Start: 2023-11-30 | End: 2023-11-30

## 2023-11-30 RX ORDER — DEXAMETHASONE SODIUM PHOSPHATE 4 MG/ML
VIAL (ML) INJECTION AS NEEDED
Status: DISCONTINUED | OUTPATIENT
Start: 2023-11-30 | End: 2023-11-30 | Stop reason: SURG

## 2023-11-30 RX ORDER — MORPHINE SULFATE 10 MG/ML
6 INJECTION, SOLUTION INTRAMUSCULAR; INTRAVENOUS EVERY 10 MIN PRN
Status: DISCONTINUED | OUTPATIENT
Start: 2023-11-30 | End: 2023-11-30

## 2023-11-30 RX ORDER — METOPROLOL TARTRATE 1 MG/ML
2.5 INJECTION, SOLUTION INTRAVENOUS ONCE
Status: DISCONTINUED | OUTPATIENT
Start: 2023-11-30 | End: 2023-11-30

## 2023-11-30 RX ORDER — ROCURONIUM BROMIDE 10 MG/ML
INJECTION, SOLUTION INTRAVENOUS AS NEEDED
Status: DISCONTINUED | OUTPATIENT
Start: 2023-11-30 | End: 2023-11-30 | Stop reason: SURG

## 2023-11-30 RX ORDER — EPHEDRINE SULFATE 50 MG/ML
INJECTION, SOLUTION INTRAVENOUS AS NEEDED
Status: DISCONTINUED | OUTPATIENT
Start: 2023-11-30 | End: 2023-11-30 | Stop reason: SURG

## 2023-11-30 RX ORDER — CEFAZOLIN SODIUM/WATER 2 G/20 ML
2 SYRINGE (ML) INTRAVENOUS ONCE
Status: COMPLETED | OUTPATIENT
Start: 2023-11-30 | End: 2023-11-30

## 2023-11-30 RX ORDER — BUPIVACAINE HYDROCHLORIDE AND EPINEPHRINE 5; 5 MG/ML; UG/ML
INJECTION, SOLUTION PERINEURAL AS NEEDED
Status: DISCONTINUED | OUTPATIENT
Start: 2023-11-30 | End: 2023-11-30 | Stop reason: HOSPADM

## 2023-11-30 RX ORDER — HYDROMORPHONE HYDROCHLORIDE 1 MG/ML
0.2 INJECTION, SOLUTION INTRAMUSCULAR; INTRAVENOUS; SUBCUTANEOUS EVERY 5 MIN PRN
Status: DISCONTINUED | OUTPATIENT
Start: 2023-11-30 | End: 2023-11-30

## 2023-11-30 RX ORDER — MORPHINE SULFATE 4 MG/ML
2 INJECTION, SOLUTION INTRAMUSCULAR; INTRAVENOUS EVERY 10 MIN PRN
Status: DISCONTINUED | OUTPATIENT
Start: 2023-11-30 | End: 2023-11-30

## 2023-11-30 RX ORDER — HYDROMORPHONE HYDROCHLORIDE 1 MG/ML
0.6 INJECTION, SOLUTION INTRAMUSCULAR; INTRAVENOUS; SUBCUTANEOUS EVERY 5 MIN PRN
Status: DISCONTINUED | OUTPATIENT
Start: 2023-11-30 | End: 2023-11-30

## 2023-11-30 RX ORDER — PROCHLORPERAZINE EDISYLATE 5 MG/ML
5 INJECTION INTRAMUSCULAR; INTRAVENOUS EVERY 8 HOURS PRN
Status: DISCONTINUED | OUTPATIENT
Start: 2023-11-30 | End: 2023-11-30

## 2023-11-30 RX ADMIN — EPHEDRINE SULFATE 5 MG: 50 INJECTION, SOLUTION INTRAVENOUS at 07:57:00

## 2023-11-30 RX ADMIN — DEXAMETHASONE SODIUM PHOSPHATE 4 MG: 4 MG/ML VIAL (ML) INJECTION at 07:17:00

## 2023-11-30 RX ADMIN — EPHEDRINE SULFATE 5 MG: 50 INJECTION, SOLUTION INTRAVENOUS at 07:40:00

## 2023-11-30 RX ADMIN — LIDOCAINE HYDROCHLORIDE 20 MG: 10 INJECTION, SOLUTION EPIDURAL; INFILTRATION; INTRACAUDAL; PERINEURAL at 07:19:00

## 2023-11-30 RX ADMIN — GLYCOPYRROLATE 0.2 MG: 0.2 INJECTION, SOLUTION INTRAMUSCULAR; INTRAVENOUS at 07:17:00

## 2023-11-30 RX ADMIN — LIDOCAINE HYDROCHLORIDE 30 MG: 10 INJECTION, SOLUTION EPIDURAL; INFILTRATION; INTRACAUDAL; PERINEURAL at 07:18:00

## 2023-11-30 RX ADMIN — ROCURONIUM BROMIDE 50 MG: 10 INJECTION, SOLUTION INTRAVENOUS at 07:19:00

## 2023-11-30 RX ADMIN — ONDANSETRON 4 MG: 2 INJECTION INTRAMUSCULAR; INTRAVENOUS at 07:17:00

## 2023-11-30 RX ADMIN — SODIUM CHLORIDE, SODIUM LACTATE, POTASSIUM CHLORIDE, CALCIUM CHLORIDE: 600; 310; 30; 20 INJECTION, SOLUTION INTRAVENOUS at 07:14:00

## 2023-11-30 RX ADMIN — SODIUM CHLORIDE, SODIUM LACTATE, POTASSIUM CHLORIDE, CALCIUM CHLORIDE: 600; 310; 30; 20 INJECTION, SOLUTION INTRAVENOUS at 08:31:00

## 2023-11-30 RX ADMIN — CEFAZOLIN SODIUM/WATER 2 G: 2 G/20 ML SYRINGE (ML) INTRAVENOUS at 07:30:00

## 2023-11-30 NOTE — ANESTHESIA PROCEDURE NOTES
Airway  Date/Time: 11/30/2023 7:21 AM  Urgency: Elective    Airway not difficult    General Information and Staff    Patient location during procedure: OR  Anesthesiologist: Kosta Romero MD  Resident/CRNA: Gi Keene CRNA  Performed: CRNA   Performed by:  Gi Keene CRNA  Authorized by: Kosta Romero MD      Indications and Patient Condition  Indications for airway management: anesthesia  Spontaneous ventilation: present  Sedation level: deep  Preoxygenated: yes  Patient position: sniffing  MILS maintained throughout  Mask difficulty assessment: 1 - vent by mask  Planned trial extubation    Final Airway Details  Final airway type: endotracheal airway      Successful airway: ETT  Cuffed: yes   Successful intubation technique: direct laryngoscopy  Endotracheal tube insertion site: oral  Blade: Lukasz  Blade size: #3  ETT size (mm): 7.0    Cormack-Lehane Classification: grade IIA - partial view of glottis  Placement verified by: capnometry   Cuff volume (mL): 7  Measured from: lips  ETT to lips (cm): 21  Number of attempts at approach: 1

## 2023-11-30 NOTE — INTERVAL H&P NOTE
Pre-op Diagnosis: Lumbar radiculopathy, spinal stenosis lumbar region with neurogenic claudication, lumbar herniated nucleus pulposus    The above referenced H&P was reviewed by Myra Vieyra MD on 11/30/2023, the patient was examined and no significant changes have occurred in the patient's condition since the H&P was performed. I discussed with the patient and/or legal representative the potential benefits, risks and side effects of this procedure; the likelihood of the patient achieving goals; and potential problems that might occur during recuperation. I discussed reasonable alternatives to the procedure, including risks, benefits and side effects related to the alternatives and risks related to not receiving this procedure. We will proceed with procedure as planned. Surgical site confirmed and marked. Day of Surgery Spine Exam:    Sensation intact to light touch bilaterally, C5-T1. Lower Extremity Exam:   Strength   Hip flexion: 5/5 R, 5/5 L   Knee extension: 5/5 R, 5/5 L   Knee flexion: 5/5 R, 5/5 L  Ankle dorsiflexion: 5/5 R, 5/5 L   Great toe extension: 5/5 R, 5/5 L  Ankle plantar flexion: 5/5 R, 5/5 L     Sensation intact to light touch bilaterally, L2-S1.

## 2023-12-13 NOTE — OPERATIVE REPORT
PATIENT  Deshaun Virk    DATE OF SURGERY  11/30/23    SURGEON   Sebastián Hung MD    ASSISTANT    PREOPERATIVE DIAGNOSIS   Right L4-5 Herniated nucleus pulposus with persistent lumbar radiculopathy with motor weakness. POSTOPERATIVE DIAGNOSIS   RIght L4-5 Herniated nucleus pulposus with persistent lumbar radiculopathy with motor weakness. PROCEDURES   1. L4-5 laminectomy/diskectomy from a right sided approach under direct visualization. 2. Use of fluoroscopy. 3. Use of intra-operative microscope. DESCRIPTION OF PROCEDURE   Large HNP removed without incident    ANESTHESIA   General endotracheal    COMPLICATIONS   None. BLOOD LOSS   Minimal.     INDICATIONS   The patient is a 59year-old female with persistent lumbar radiculopathy with motor weakness due to large HNP at L4-5. The patient failed   nonoperative intervention and they elected to proceed with surgical   Decompression. The patient understood the risks and benefits, including risks of failure to improve, neurologic deterioration, infection, durotomy, continued low back pain, and progression of her spondylolisthesis were explained to the patient at length. The patient also understood the risks of anesthesia which include possible stroke, MI, death. TECHNIQUE   The patient was brought to the operating room. General   anesthesia with endotracheal intubation was performed. The patient was positioned prone on the Андрей spinal frame. Care was taken to ensure bony prominences   were well padded. The lower back was prepped and draped in the   Usual sterile fashion. A surgical timeout was performed according to the [facility] standards identifying the appropriate patient, surgical site, and surgical procedure. Under fluoroscopy, an incision was made just off the   midline overlying the L4-5 interlaminar window. The incision   continued through fascia with Bovie dissection.  Sequential   dilators were advanced into the caudal edge of the L4 lamina under fluoroscopy. A tubular retractor was then advanced and secured   to the operating table. Positioning of this was confirmed with   biplanar fluoroscopy. The microscope was brought into the field. Soft tissue was   cleared off bony edges of the interlaminar window. Using a clarke, the   caudal edge of the L4 lamina was removed down to the ligamentum flavum. A partial medial facetectomy was performed in order to visualize the lateral recess. The ligamentum flavum was then penetrated with a microcurette and   removed piecemeal. The dura sac, traversing nerve root and HNP were identified. The HNP was isolated and removed in its entirety. An angled hockey stick elevator was tracked underneath the thecal sac proximally and distally and ensured adequate decompression with no remaining fragments compressing the nerve root or the thecal sac. At this point, we were satisfied with the extent of the decompression, the neural elements were seen to be free. The wound was thoroughly irrigated and hemostasis was ensured. Sterile solution was infiltrated. Meticulous hemostasis was achieved. Fascia was approximated with 0 Vicryl suture. Subcutaneous tissue   and skin was approximated with suture. Sterile dressing applied. The patient returned to recovery in stable condition. I was present for and performed the entire procedure.

## 2024-02-08 ENCOUNTER — HOSPITAL ENCOUNTER (OUTPATIENT)
Age: 59
Discharge: HOME | End: 2024-02-08
Payer: COMMERCIAL

## 2024-02-08 VITALS
DIASTOLIC BLOOD PRESSURE: 94 MMHG | RESPIRATION RATE: 18 BRPM | HEART RATE: 70 BPM | SYSTOLIC BLOOD PRESSURE: 163 MMHG | TEMPERATURE: 98 F | OXYGEN SATURATION: 99 %

## 2024-02-08 DIAGNOSIS — R03.0 ELEVATED BLOOD PRESSURE READING: ICD-10-CM

## 2024-02-08 DIAGNOSIS — J01.41 ACUTE RECURRENT PANSINUSITIS: Primary | ICD-10-CM

## 2024-02-08 PROCEDURE — 99213 OFFICE O/P EST LOW 20 MIN: CPT | Performed by: NURSE PRACTITIONER

## 2024-02-08 RX ORDER — AMOXICILLIN AND CLAVULANATE POTASSIUM 875; 125 MG/1; MG/1
1 TABLET, FILM COATED ORAL 2 TIMES DAILY
Qty: 14 TABLET | Refills: 0 | Status: SHIPPED | OUTPATIENT
Start: 2024-02-08 | End: 2024-02-15

## 2024-02-08 NOTE — DISCHARGE INSTRUCTIONS
Augmentin 1 tablet twice per day for 7 days with food  Flonase nasal spray, 2 sprays in each nostril daily for 2 weeks  Abdiaziz Sinus rinse, available over the counter, do this 2-3 times per day  Push fluids  Steam showers  If you develop facial swelling, chest pain, shortness of breath or any new/worsening symptoms, return or go to the emergency room  If no improvement in 1 week, please see your primary care provider    Your blood pressure is high today. Please check your blood pressure at home 3 times per day, and write it down. Do this for 5 days. Follow up with your doctor in 5 days, and bring the blood pressure log with you for review. For now, avoid high sodium foods. Avoid decongestants like Afrin nasal spray or Sudafed. Blood pressure over 180/100 is concerning, if you have 2 consecutive readings like this, please call your primary care provider or go directly to the emergency room. If you develop chest pain, shortness of breath, dizziness, headache or other unusual symptoms, please go to the emergency room.

## 2024-02-08 NOTE — ED PROVIDER NOTES
Chief Complaint   Patient presents with    Sinus Problem       HPI:     Tami Noel is a 58 year old female with hypertension and recurrent sinusitis who presents for evaluation and management of a chief complaint of sinus pressure and nasal congestion for 10 days.  States worse on left frontal and maxillary area.  No fever.  No cough.  No chest pain or shortness of breath.  No nausea, vomiting, diarrhea, or abdominal pain.    PFSH  PFSH asessment screens reviewed and agree.  Nursing note reviewed and I agree with documentation.    Family History   Problem Relation Age of Onset    Hypertension Father     Other (Other) Father         arthritis    Stroke Mother     Hypertension Mother     Breast Cancer Paternal Grandmother         77 for 2nd reoccurrance     Family history reviewed with patient/caregiver and is not pertinent to presenting problem.  Social History     Socioeconomic History    Marital status:      Spouse name: Not on file    Number of children: Not on file    Years of education: Not on file    Highest education level: Not on file   Occupational History    Not on file   Tobacco Use    Smoking status: Never     Passive exposure: Never    Smokeless tobacco: Never   Vaping Use    Vaping Use: Never used   Substance and Sexual Activity    Alcohol use: Not Currently     Alcohol/week: 0.0 standard drinks of alcohol     Comment: RARELY    Drug use: No    Sexual activity: Yes     Partners: Male   Other Topics Concern     Service Not Asked    Blood Transfusions Not Asked    Caffeine Concern No    Occupational Exposure Not Asked    Hobby Hazards Not Asked    Sleep Concern Not Asked    Stress Concern Not Asked    Weight Concern Not Asked    Special Diet Not Asked    Back Care Not Asked    Exercise Not Asked    Bike Helmet Not Asked    Seat Belt Not Asked    Self-Exams Not Asked   Social History Narrative    Not on file     Social Determinants of Health     Financial Resource Strain: Not on file    Food Insecurity: Not on file   Transportation Needs: Not on file   Physical Activity: Not on file   Stress: Not on file   Social Connections: Not on file   Housing Stability: Not on file        Findings:    BP (!) 163/94   Pulse 70   Temp 97.5 °F (36.4 °C) (Temporal)   Resp 18   LMP 02/01/2016 (Exact Date)   SpO2 99%   GENERAL: well developed, well nourished, well hydrated, no distress  HEAD: normocephalic  NECK: supple, no adenopathy  EYES: sclera non icteric bilateral, conjunctiva clear  EARS: TM  bilateral: normal  NOSE: nasal turbinates: swollen and red. + left frontal and maxillary sinus tenderness.  THROAT: clear, without exudates  CARDIO: RRR without murmur  LUNGS: clear to auscultation bilaterally; no rales, rhonchi, or wheezes  GI: soft, non-tender, normal bowel sounds  SKIN: good skin turgor, no obvious rashes    MDM/Assessment/Plan:   Orders for this encounter:  Orders Placed This Encounter    amoxicillin clavulanate 875-125 MG Oral Tab     Sig: Take 1 tablet by mouth 2 (two) times daily for 7 days.     Dispense:  14 tablet     Refill:  0       Labs performed this visit:  No results found for this or any previous visit (from the past 10 hour(s)).    MDM:   Medical Decision Making  Differentials include: acute sinusitis vs viral URI vs pneumonia    HPI and exam consistent with acute sinusitis. Lungs are clear today, low suspicion for pneumonia. Will start Augmentin. Supportive care discussed including Abdiaziz sinus rinse, Flonase, fluids, steam showers. Return precautions discussed. Advised follow up with primary care provider in 1 week if no improvement. Patient verbalized understanding and agreeable to plan of care.       Risk  OTC drugs.  Prescription drug management.              Diagnosis:    ICD-10-CM    1. Acute recurrent pansinusitis  J01.41       2. Elevated blood pressure reading  R03.0           All results reviewed and discussed with patient.  See AVS for detailed discharge instructions for  your condition today.    Follow Up with:  No follow-up provider specified.

## 2024-04-04 ENCOUNTER — OFFICE VISIT (OUTPATIENT)
Dept: FAMILY MEDICINE CLINIC | Facility: CLINIC | Age: 59
End: 2024-04-04
Payer: COMMERCIAL

## 2024-04-04 VITALS
TEMPERATURE: 98 F | HEART RATE: 69 BPM | OXYGEN SATURATION: 98 % | WEIGHT: 124 LBS | RESPIRATION RATE: 16 BRPM | HEIGHT: 60 IN | BODY MASS INDEX: 24.35 KG/M2 | DIASTOLIC BLOOD PRESSURE: 84 MMHG | SYSTOLIC BLOOD PRESSURE: 150 MMHG

## 2024-04-04 DIAGNOSIS — H69.93 ETD (EUSTACHIAN TUBE DYSFUNCTION), BILATERAL: Primary | ICD-10-CM

## 2024-04-04 DIAGNOSIS — I10 ELEVATED BLOOD PRESSURE READING IN OFFICE WITH DIAGNOSIS OF HYPERTENSION: ICD-10-CM

## 2024-04-04 PROCEDURE — 99213 OFFICE O/P EST LOW 20 MIN: CPT | Performed by: PHYSICIAN ASSISTANT

## 2024-04-04 RX ORDER — PREDNISONE 20 MG/1
40 TABLET ORAL DAILY
Qty: 10 TABLET | Refills: 0 | Status: SHIPPED | OUTPATIENT
Start: 2024-04-04 | End: 2024-04-09

## 2024-04-04 NOTE — PROGRESS NOTES
CHIEF COMPLAINT:     Chief Complaint   Patient presents with    Ear Pain     Bilat ear pain x 4 days   Denies fever   OTC Sudafed, Flonase       HPI:   Tami Noel is a 59 year old female who presents to clinic today with complaints of bilat ear pain. Has had for 4  days. Pain is described as pressure, slightly achy now, feels underwater at times.  Patient reports mild history of ear infections, significant hx of sinusitis, Home treatment includes Sudafed, Flonase.     Associated symptoms:  Patient reports decreased hearing. Patient denies hearing loss. Patient denies drainage. Patient reports following URI symptoms: mild burning in maxillary sinuses     Current Outpatient Medications   Medication Sig Dispense Refill    metoprolol succinate ER 50 MG Oral Tablet 24 Hr Take 1 tablet (50 mg total) by mouth daily.      mupirocin 2 % External Ointment Apply ointment twice a day for five days prior to surgery - once in the morning and once in the evening 22 g 0    Multiple Vitamins-Minerals (MULTI-VITAMIN/MINERALS) Oral Tab Take 1 tablet by mouth daily.      Acetaminophen ER (MIDOL) 650 MG Oral Tab CR Take 1 tablet (650 mg total) by mouth every 8 (eight) hours as needed for Pain.      bumetanide 0.5 MG Oral Tab Take 0.5 tablets (0.25 mg total) by mouth daily.      CALCIUM 1200 OR Take by mouth 2 (two) times daily.      losartan 50 MG Oral Tab Take 1 tablet (50 mg total) by mouth 2 (two) times daily.      atorvastatin 40 MG Oral Tab Take 1 tablet (40 mg total) by mouth nightly. 30 tablet 1    aspirin 81 MG Oral Chew Tab Chew 1 tablet (81 mg total) by mouth daily. 30 tablet 1    Multiple Minerals-Vitamins (CITRACAL PLUS OR) Take 1 tablet by mouth daily. Sylvester Cit-Mag-D3-Zn--Man-Bor 250- mg tablet        Past Medical History:   Diagnosis Date    Back problem     Essential hypertension     Hepatitis C     resolved    High blood pressure     History of blood transfusion 1970    ITP (idiopathic thrombocytopenic  purpura)     Migraine     Osteopenia     Stroke (HCC) 07/2020    no deficits    TIA (transient ischemic attack) 07/2020      Social History:  Social History     Socioeconomic History    Marital status:    Tobacco Use    Smoking status: Never     Passive exposure: Never    Smokeless tobacco: Never   Vaping Use    Vaping Use: Never used   Substance and Sexual Activity    Alcohol use: Not Currently     Alcohol/week: 0.0 standard drinks of alcohol     Comment: RARELY    Drug use: No    Sexual activity: Yes     Partners: Male   Other Topics Concern    Caffeine Concern No        REVIEW OF SYSTEMS:   GENERAL: See HPI  HEENT: See HPI      EXAM:   BP (!) 152/94   Pulse 69   Temp 97.9 °F (36.6 °C)   Resp 16   Ht 5' (1.524 m)   Wt 124 lb (56.2 kg)   LMP 02/01/2016 (Exact Date)   SpO2 98%   BMI 24.22 kg/m²   GENERAL: well developed, well nourished,in no apparent distress  SKIN: no rashes,no suspicious lesions  HEAD: atraumatic, normocephalic  EYES: conjunctiva clear, EOM intact  EARS: Homar tragus non tender with manipulation.  External auditory canals healthy. Right TMs: non injected, mild bulging, no retraction,+ air fluid level bilat   NOSE: nostrils patent, minimal nasal mucus, nasal mucosa reddened  THROAT: oral mucosa pink, moist. Posterior pharynx non erythematous or injected. No exudates.  NECK: supple, non-tender  LUNGS: clear to auscultation bilaterally, no wheezes or rhonchi. Breathing is non labored.  CARDIO:   EXTREMITIES: no cyanosis, clubbing or edema  LYMPH: No cervical or submandibular lymphadenopathy.      ASSESSMENT AND PLAN:   Tami Noel is a 59 year old female who presents with ear problems symptoms are consistent with    ASSESSMENT:  Encounter Diagnoses   Name Primary?    ETD (Eustachian tube dysfunction), bilateral Yes    Elevated blood pressure reading in office with diagnosis of hypertension        PLAN: Significant effusion without infection, already using Flonase and Sudafed.  Advised to stop Sudafed for now as likely drying her out too much and could be elevating her BP,  Meds as listed below.  Comfort measures as described in Patient Instructions      BP elevated x2 in [facility] today.  Patient to monitor BP and follow up with PCP if consistently >140/>90.    Meds & Refills for this Visit:  Requested Prescriptions     Signed Prescriptions Disp Refills    predniSONE 20 MG Oral Tab 10 tablet 0     Sig: Take 2 tablets (40 mg total) by mouth daily for 5 days.         Risk and benefits of medication discussed.   If antibiotics prescribed, stressed importance of completing full course of antibiotic.     Follow up with PCP if s/sx worsen, do not begin to improve in 3 days,  Patient voiced understand and is in agreement with treatment plan.

## 2024-06-19 ENCOUNTER — APPOINTMENT (OUTPATIENT)
Dept: URBAN - METROPOLITAN AREA CLINIC 244 | Age: 59
Setting detail: DERMATOLOGY
End: 2024-06-19

## 2024-06-19 DIAGNOSIS — L81.4 OTHER MELANIN HYPERPIGMENTATION: ICD-10-CM

## 2024-06-19 DIAGNOSIS — L82.1 OTHER SEBORRHEIC KERATOSIS: ICD-10-CM

## 2024-06-19 DIAGNOSIS — D485 NEOPLASM OF UNCERTAIN BEHAVIOR OF SKIN: ICD-10-CM

## 2024-06-19 DIAGNOSIS — L57.8 OTHER SKIN CHANGES DUE TO CHRONIC EXPOSURE TO NONIONIZING RADIATION: ICD-10-CM

## 2024-06-19 DIAGNOSIS — D22 MELANOCYTIC NEVI: ICD-10-CM

## 2024-06-19 PROBLEM — D48.5 NEOPLASM OF UNCERTAIN BEHAVIOR OF SKIN: Status: ACTIVE | Noted: 2024-06-19

## 2024-06-19 PROBLEM — D22.39 MELANOCYTIC NEVI OF OTHER PARTS OF FACE: Status: ACTIVE | Noted: 2024-06-19

## 2024-06-19 PROBLEM — D23.72 OTHER BENIGN NEOPLASM OF SKIN OF LEFT LOWER LIMB, INCLUDING HIP: Status: ACTIVE | Noted: 2024-06-19

## 2024-06-19 PROCEDURE — 99213 OFFICE O/P EST LOW 20 MIN: CPT | Mod: 25

## 2024-06-19 PROCEDURE — OTHER COUNSELING: OTHER

## 2024-06-19 PROCEDURE — 11102 TANGNTL BX SKIN SINGLE LES: CPT

## 2024-06-19 PROCEDURE — OTHER BIOPSY BY SHAVE METHOD: OTHER

## 2024-06-19 PROCEDURE — OTHER OTC TREATMENT REGIMEN: OTHER

## 2024-06-19 PROCEDURE — OTHER DIAGNOSIS COMMENT: OTHER

## 2024-06-19 PROCEDURE — OTHER PHOTO-DOCUMENTATION: OTHER

## 2024-06-19 ASSESSMENT — LOCATION DETAILED DESCRIPTION DERM
LOCATION DETAILED: UPPER STERNUM
LOCATION DETAILED: LEFT INFERIOR CENTRAL MALAR CHEEK
LOCATION DETAILED: RIGHT INFERIOR CENTRAL MALAR CHEEK
LOCATION DETAILED: RIGHT SUPERIOR MEDIAL UPPER BACK
LOCATION DETAILED: INFERIOR MID FOREHEAD
LOCATION DETAILED: RIGHT PROXIMAL CALF

## 2024-06-19 ASSESSMENT — LOCATION SIMPLE DESCRIPTION DERM
LOCATION SIMPLE: CHEST
LOCATION SIMPLE: RIGHT CALF
LOCATION SIMPLE: RIGHT CHEEK
LOCATION SIMPLE: INFERIOR FOREHEAD
LOCATION SIMPLE: RIGHT UPPER BACK
LOCATION SIMPLE: LEFT CHEEK

## 2024-06-19 ASSESSMENT — LOCATION ZONE DERM
LOCATION ZONE: TRUNK
LOCATION ZONE: FACE
LOCATION ZONE: LEG

## 2024-06-19 NOTE — PROCEDURE: DIAGNOSIS COMMENT
Detail Level: Simple
Render Risk Assessment In Note?: no
Comment: No clinical abnormal findings on L. cheek today. previous note was reviewed. Pt had no concerns about her L. cheek today.

## 2024-06-19 NOTE — PROCEDURE: BIOPSY BY SHAVE METHOD
Size Of Lesion In Cm: 0
Bill For Surgical Tray: no
Curettage Text: The wound bed was treated with curettage after the biopsy was performed.
Depth Of Biopsy: dermis
Hemostasis: Aluminum Chloride
Billing Type: Third-Party Bill
Consent: Written consent was obtained and risks were reviewed including but not limited to scarring, infection, bleeding, scabbing, incomplete removal, nerve damage and allergy to anesthesia.
Was A Bandage Applied: Yes
Type Of Destruction Used: Curettage
Anesthesia Volume In Cc: 0.5
Anesthesia Type: 1% lidocaine with epinephrine
Post-Care Instructions: I reviewed with the patient in detail post-care instructions. Patient is to keep the biopsy site dry overnight, and then apply bacitracin twice daily until healed. Patient may apply hydrogen peroxide soaks to remove any crusting.
Silver Nitrate Text: The wound bed was treated with silver nitrate after the biopsy was performed.
Biopsy Method: double edge Personna blade
Biopsy Type: H and E
Notification Instructions: Patient will be notified of biopsy results. However, patient instructed to call the office if not contacted within 2 weeks.
Electrodesiccation Text: The wound bed was treated with electrodesiccation after the biopsy was performed.
Lab: -[id number]
Cryotherapy Text: The wound bed was treated with cryotherapy after the biopsy was performed.
Electrodesiccation And Curettage Text: The wound bed was treated with electrodesiccation and curettage after the biopsy was performed.
Dressing: bandage
Detail Level: Detailed
Wound Care: Petrolatum
Information: Selecting Yes will display possible errors in your note based on the variables you have selected. This validation is only offered as a suggestion for you. PLEASE NOTE THAT THE VALIDATION TEXT WILL BE REMOVED WHEN YOU FINALIZE YOUR NOTE. IF YOU WANT TO FAX A PRELIMINARY NOTE YOU WILL NEED TO TOGGLE THIS TO 'NO' IF YOU DO NOT WANT IT IN YOUR FAXED NOTE.

## 2024-07-28 ENCOUNTER — HOSPITAL ENCOUNTER (OUTPATIENT)
Age: 59
Discharge: HOME | End: 2024-07-28
Payer: COMMERCIAL

## 2024-07-28 VITALS
HEART RATE: 75 BPM | DIASTOLIC BLOOD PRESSURE: 101 MMHG | OXYGEN SATURATION: 97 % | SYSTOLIC BLOOD PRESSURE: 171 MMHG | RESPIRATION RATE: 18 BRPM | TEMPERATURE: 98 F

## 2024-07-28 DIAGNOSIS — L03.317 CELLULITIS OF LEFT BUTTOCK: Primary | ICD-10-CM

## 2024-07-28 PROCEDURE — 99213 OFFICE O/P EST LOW 20 MIN: CPT | Performed by: PHYSICIAN ASSISTANT

## 2024-07-28 RX ORDER — DOXYCYCLINE HYCLATE 100 MG/1
100 CAPSULE ORAL 2 TIMES DAILY
Qty: 14 CAPSULE | Refills: 0 | Status: SHIPPED | OUTPATIENT
Start: 2024-07-28 | End: 2024-08-04

## 2024-07-28 NOTE — DISCHARGE INSTRUCTIONS
TAKE ANTIBIOTIC. RECOMMEND PROBIOTIC DURING USE.     FOLLOW UP FOR WOUND CHECK IF WORSENING SYMPTOMS as DISCUSSED OVER NEXT 2-3 DAYS.

## 2024-07-28 NOTE — ED PROVIDER NOTES
Chief Complaint   Patient presents with    Skin Problem       HPI:     Tami Noel is a 59 year old female who presents for evaluation of concern of an insect bite to the left buttocks over the last 4 days.  Patient denies specific insect visualized or history of tick bites or Lyme disease.  Denies history of complicated cellulitis including MRSA or recent antibiotic.  Denies associated fever.  Pain is a 2 out of 10, notes medications since onset.        PFSH    PFSH asessment screens reviewed and agree.  Nurses notes reviewed I agree with documentation.    Family History   Problem Relation Age of Onset    Hypertension Father     Other (Other) Father         arthritis    Stroke Mother     Hypertension Mother     Breast Cancer Paternal Grandmother         77 for 2nd reoccurrance     Family history reviewed with patient/caregiver and is not pertinent to presenting problem.  Social History     Socioeconomic History    Marital status:      Spouse name: Not on file    Number of children: Not on file    Years of education: Not on file    Highest education level: Not on file   Occupational History    Not on file   Tobacco Use    Smoking status: Never     Passive exposure: Never    Smokeless tobacco: Never   Vaping Use    Vaping status: Never Used   Substance and Sexual Activity    Alcohol use: Not Currently     Alcohol/week: 0.0 standard drinks of alcohol     Comment: RARELY    Drug use: No    Sexual activity: Yes     Partners: Male   Other Topics Concern     Service Not Asked    Blood Transfusions Not Asked    Caffeine Concern No    Occupational Exposure Not Asked    Hobby Hazards Not Asked    Sleep Concern Not Asked    Stress Concern Not Asked    Weight Concern Not Asked    Special Diet Not Asked    Back Care Not Asked    Exercise Not Asked    Bike Helmet Not Asked    Seat Belt Not Asked    Self-Exams Not Asked   Social History Narrative    Not on file     Social Determinants of Health     Financial  Resource Strain: Not on file   Food Insecurity: Not on file   Transportation Needs: Not on file   Physical Activity: Not on file   Stress: Not on file   Social Connections: Not on file   Housing Stability: Not on file         ROS:   Positive for stated complaint: BUTTOCK pain.  All other systems reviewed and negative except as noted above.  Constitutional and Vital Signs Reviewed.      Physical Exam:     Findings:    BP (!) 171/101   Pulse 75   Temp 97.8 °F (36.6 °C) (Temporal)   Resp 18   LMP 02/01/2016 (Exact Date)   SpO2 97%   GENERAL: well developed, well nourished, well hydrated, no distress  SKIN: good skin turgor, no obvious rashes  EXTREMITIES: Mild erythema with central wound left mid gluteal region dimensions 7 cm x 5 cm.  NO Target sign.  No induration fluctuance or dehiscence.  Slight warmth ; no cyanosis or edema. SANCHEZ without difficulty  HEAD: normocephalic, atraumatic  EYES: sclera non icteric bilateral, conjunctiva clear  NOSE: nasal turbinates: pink, normal mucosa  NEURO: No focal deficits  PSYCH: Alert and oriented x3.  Answering questions appropriately.  Mood appropriate.    MDM/Assessment/Plan:   Orders for this encounter:    Orders Placed This Encounter    doxycycline 100 MG Oral Cap     Sig: Take 1 capsule (100 mg total) by mouth 2 (two) times daily for 7 days.     Dispense:  14 capsule     Refill:  0       Labs performed this visit:  No results found for this or any previous visit (from the past 10 hour(s)).    MDM:  Patient agrees empiric antibiotics and outpatient follow-up as needed.  Happy with plan of care.  Instructed indications to readdress more promptly    Diagnosis:    ICD-10-CM    1. Cellulitis of left buttock  L03.317           All results reviewed and discussed with patient.  See AVS for detailed discharge instructions for your condition today.    Follow Up with:  Nikko Levya  [address] [facility]  [address]  [place] [address] [phone]    Schedule an appointment as  soon as possible for a visit in 3 days  As needed, If symptoms worsen, For wound re-check

## 2024-08-11 NOTE — PATIENT INSTRUCTIONS
1. Cont. Exercises   2. occl Flexeril (cyclobenzaprine ) 10mg at night at night   3. Voltaren gel 1 % topical   4.return to clinic as needed. [de-identified] : FELISA VIERA is a 52-year-old female presents to follow-up right leg pain. The patient reports that the pain is intense and has been persistent. Left knee is better Right still have pain came with brace physical therapy. The patient is currently taking Meloxicam for pain management and occasionally uses Tylenol. The pain is so severe that the patient is unable to bear weight on the leg. Last visit was 06/28/2024 Euflexxa#3 done bilateral knee.

## 2024-09-04 ENCOUNTER — OFFICE VISIT (OUTPATIENT)
Dept: RHEUMATOLOGY | Facility: CLINIC | Age: 59
End: 2024-09-04
Payer: COMMERCIAL

## 2024-09-04 VITALS
HEIGHT: 60 IN | HEART RATE: 74 BPM | SYSTOLIC BLOOD PRESSURE: 151 MMHG | BODY MASS INDEX: 23.58 KG/M2 | WEIGHT: 120.13 LBS | DIASTOLIC BLOOD PRESSURE: 90 MMHG | RESPIRATION RATE: 16 BRPM

## 2024-09-04 DIAGNOSIS — M79.10 MYALGIA: ICD-10-CM

## 2024-09-04 DIAGNOSIS — G89.29 CHRONIC BILATERAL LOW BACK PAIN WITHOUT SCIATICA: Primary | ICD-10-CM

## 2024-09-04 DIAGNOSIS — M54.50 CHRONIC BILATERAL LOW BACK PAIN WITHOUT SCIATICA: Primary | ICD-10-CM

## 2024-09-04 PROCEDURE — 99214 OFFICE O/P EST MOD 30 MIN: CPT | Performed by: INTERNAL MEDICINE

## 2024-09-04 NOTE — PROGRESS NOTES
Tami Noel is a 59 year old female who presents for   Chief Complaint   Patient presents with    Follow - Up    Back Pain   .   HPI:     I had the pleasure of seeing Tami Noel on 6/11/2018 for evaluation.     She is a pleasant 53 year old who has joint pain. She was referred by Dr. Leyva.   She has hx of ITP - at age 10 - in 1976.  She was given doses of prednisone when diagnosed. She has had to get treated prior to [facility] but overall this has been ok.   She needed a blood transfusion at age 10 - 1978 for the ITP.  She was found  with hepatitis C - in 1998 and thought to be from blood transfusion.   She was found out when going through IVF.   She then recently had it checked at [facility] - she was in a trial and she was cured - 2015.     She started having a couple years ago - about 2 years ago - she had a bad right shoulder pain. She went to an orthopedist and did PT. She had pain in her right arm and right shoulder.   She was found to have right tennis elbow. It didn't resolve.   She thought it was stress from her job.   Lately her right hip and lower back is hurting. So she went to get chiropractic physical therapy.   So she's had constant pain in her neck , right shoulder and right leg.     She had a bone density test at [facility]. She was taking risendronate and she was thinking this might be triggering the pain - she stopped it one month ago.   No change with her pain. She has 8/10 pain overall in he rright hip, shoulder, neck and back.     She has been taking asp 500mg twice a day. This was helpeing. But not lately.   She feels the lower back hurst a lot at night. And more her rigth sided. She has the sciatica on her right side.   The chiropracter is not helping that much with her neck and back.   Massage helps a little bit.     She had kidney stones about 2 months ago. No uti.   Her ITP is not bad at all.     8/17/2018  It started out with pain in neck and shoulder - it's not as bad.   But  now it's right knee and her right hip.   Tests negative for RA in 2015 and now negative for lupus and antiphoshpolipid syndrome.   She did physical therapy - for 2 months - she felt it helpe her neck and shoulder - her low back - right side hurts as well as now right knee and right hip -   lacey's taking advil 600mg a day - for the right hip.     She [place] the woods a lot in [place].     1/7/2020   Seen by dr. tamayo - shortened note of her visit with him    Cyclobenzaprine at bedtime was tried, but made her very drowsy into the next day, so she could not take it.  She saw an orthopedist with [facility] in late 2019.  3 epidurals did not help.  In December of 2019 she got a bad case of the flu.  She developed a severe cough, which aggravated her right lower back pain.  Her pain is in her right lower back.  There is minimal pain in her right anterior thigh.  She does not have typical sciatica radiating down the back of her right leg.  There is not numbness, tingling, or weakness of her right leg.  At night she has low back discomfort.  She is refreshed on awakening, and she has good energy.  When she gets out of bed in the morning, she is stiffer.  Her joints are not swollen.  She takes aspirin, which helps.  There can be stiffness in her neck, going down to her shoulders and mid back.  Her muscles tense up.  Massage helps.    11/5/2020  She had a TIA/CVA while water skiing in 7/2020 - saw ER and was told it was vertigo.   Similar sx happened 4 days later and lacey had imblance with walking - and she went to [facility] and she was dx with a CVA. She is following with neurology now.     She had three injections for her back pain and it didn' thelp at [facility].   She went back to [facility] and dx with facet joint arthritis. She was told there was fluid in it and yesterday and she was given an injection in the facet joitn and she so far feels great.   She is wondering what kind of arthritis she  has.   She has left shoulder pains at time.   When she waks up in the morning her hands and toes in her feet - she has stiffness. Laying down she feels swollen and puffy. In the morning her pain is 8/10 - discomfort.   She has a had time walking - it's about 20 steps. Now she has 4/10 pain  udring the day.     She is walking 13 miles a day and cooking more.   She is an , with a stressful position and currently is off work b/c of pandemic.     She saw Dr. Dickinson in 1/2020 for right sided lower back pain and sciatica.     1/5/2021 -   She wanted to review her results - LEOLA 1:80 - and she saw them in 11 /2020  She has a lot of back pain - she went to ortho. She was told it might be sciatica. She was told it was facet joint arthritis in her right lower back.   latealy she has more pain everywhere in her back - last night and she can't even sleep. It's her upper back.   Sudarshan back and body helepd. She has pain in her neck shoulders mid back and lower back   It happens in the day when she waks up - but it's raquel bad when she is laying down.   She hcecked her mattress and got a new pillow.   She wanted to know if it was a rheumatologic issues.   B/c sudarshan helps more.   She has been exercising - it just hurts.   The pain in the am - she is stiff and hurts 9/10. Then the pain can go to a 5/10 - after a sudarsahn.   The pain doesn't always happen at night.   Last night she ha haritha in her lower back - but it can be her upper and mid back mostly.   manageing her pain through the day - she is ok through the night. She takes sudarshan 2-3 timesa day to help manage the pain.   Sudarshan is asa 500mg a day.     10/27/2021  Since her last visit, she has tried a few epidural injections and physical therapy.   [facility] also tried noe physical therapy and she still exercises and still does the physical therapy just in case.   In the joint there is arthritis.   She has mri lumbar spine with [facility] - she got injections there -  8/20/2021 - it never worked.   She's driving to her son's college in [facility] - she takes 1-2 daily but in the car rides she takes more.   She feels it helps but it doesn't take it all away.   flerxil helps a night when it bothers her.     She had fungal infection of her toes and needed to paint this medicine on for 6 months.   She's off plavix now -   She has right sided back pain   At times she has other pains -   At one point.she might have had sciatica like pain.   It's almost like there is something sitting on a nerve.   She keeps doing exercises - but PT and exercises are not helping that much.   She used to not be albe to lay in bed - and she doesn't have that pain anymore.   She doesn't get the monring stiffness anymore - it's not perfect but it's much perfect.   She tries to exercises it a lot to help it keep moving.     11/9/2021   She didn't get her MRI si joint yet or labs. She was confused on if she was to get a physical order.   She gets neck and shoulder pain.   She feels that her back is an area that is always bothering her.   It still hurts to lay down somteims.   She's going again to PT today.   No sciatic apins.   It's her right buttocks pain     12/13/2021  She is exercising more regularly and addressing the priformis.   She is hoping for a change  Reviewed  Lumbar xray - and c spine xray - and they were shoing djd but no inflammatory arthritis.   Her right sided pain is there.   She saw endocrinologist - and bp sugars are high   Her hba1c is 6.1.   She was given metformin lately.   She is not doing PT but she is doing home exercises.     12/28/2022  She still has the horrible pain . She retired in end of January.   She still has this pain on the right side.   She the main pain is down her hips and lower back. She's not sure if there in an arthritis or if it's really cold it hurts.   She went to [facility] and she saw 3 doctors. She got 3 back injections. She got the [facility] physical therapy.   She  then when to a chiropracter and she got adjustments - and localized tens therapy.janet causey thanksgiving for 8 -12 weeks.   And it did not go away. Like with the injections it didn't go away.     It's not her job b/c she's not stressed.   The pain is all the time. She's to take a santa back and body - two and an ibuprofen 2 daily.   She has about 8/10 pain. She gets it to a 4/10 pain with meds. It wakes her up at night sometimes.   nothoing helps for a 1 week .     She did physical therapy at [facility] in in [place] -March April 2022 -. She didn't get better.  She has subsequently - been doing home exercises.     No stomach issues right now.   Flexeril did not help her. It gave her a headache.     9/4/2024  She got microdiscetomy with Dr. Pena at [facility] - on 11/30/2023 -   But she still has right sided lower back pain.   She got injections after the surgery and this didn't help.   She has about 8/10 pain. That's with takingmeds -   She's taking santa back and body with midol.     She was told she had arthritis in her back and wanted to follow up on this and discuss further potential treatments.       Wt Readings from Last 2 Encounters:   09/04/24 120 lb 1.6 oz (54.5 kg)   04/04/24 124 lb (56.2 kg)     Body mass index is 23.46 kg/m².      Current Outpatient Medications   Medication Sig Dispense Refill    metoprolol succinate ER 50 MG Oral Tablet 24 Hr Take 1 tablet (50 mg total) by mouth daily.      Multiple Vitamins-Minerals (MULTI-VITAMIN/MINERALS) Oral Tab Take 1 tablet by mouth daily.      Acetaminophen ER (MIDOL) 650 MG Oral Tab CR Take 1 tablet (650 mg total) by mouth every 8 (eight) hours as needed for Pain.      losartan 50 MG Oral Tab Take 1 tablet (50 mg total) by mouth 2 (two) times daily.      aspirin 81 MG Oral Chew Tab Chew 1 tablet (81 mg total) by mouth daily. 30 tablet 1    Multiple Minerals-Vitamins (CITRACAL PLUS OR) Take 1 tablet by mouth daily. Sylvester Cit-Mag-D3-Zn--Man-Bor 250- mg tablet       CALCIUM 1200 OR Take by mouth 2 (two) times daily. (Patient not taking: Reported on 2024)        Past Medical History:    Back problem    Essential hypertension    Hepatitis C    resolved    High blood pressure    History of blood transfusion    ITP (idiopathic thrombocytopenic purpura)    Migraine    Osteopenia    Stroke (HCC)    no deficits    TIA (transient ischemic attack)      Past Surgical History:   Procedure Laterality Date      2001    x1    Colonoscopy  2014    Egd scope  10/08,     Sinus surgery        Splenectomy      Us biopsy liver (mng=[id number])  2012      Family History   Problem Relation Age of Onset    Hypertension Father     Other (Other) Father         arthritis    Stroke Mother     Hypertension Mother     Breast Cancer Paternal Grandmother         77 for 2nd reoccurrance      Social History:  Social History     Socioeconomic History    Marital status:    Tobacco Use    Smoking status: Never     Passive exposure: Never    Smokeless tobacco: Never   Vaping Use    Vaping status: Never Used   Substance and Sexual Activity    Alcohol use: Not Currently     Alcohol/week: 0.0 standard drinks of alcohol     Comment: RARELY    Drug use: No    Sexual activity: Yes     Partners: Male   Other Topics Concern    Caffeine Concern No      , 1 child  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 -        REVIEW OF SYSTEMS:   Review Of Systems:  Fatigue  Constitutional:No fever, no change in weight or appetitie  Derm: No rashes, no oral ulcers, no alopecia, no photosensitivity, no psoriasis  HEENT: No dry eyes, no dry mouth, no Raynaud's, no nasal ulcers, no parotid swelling, neck pain, no jaw pain, no temple pain  Had sinus sx -   Eyes: No visual changes,   CVS: No chest pain, no heart disease  RS: No SOB, no Cough, No Pleurtic pain,   GI: No nausea, no vomiiting, no abominal pain, no hx of ulcer, gastritis,  heartburn, no dyshpagia, no BRBPR or melena  Hx of hep  c - treated and cured,   : no dysuria, 6 hx of miscarriages, no DVT Hx, no hx of OCP  Hx of kidney stones,   Neuro: right arm and leg numbness or tingling, has migraaines/ headache, no hx of seizures,   Psych: no hx of anxiety or depression  ENDO: no hx of thyroid disease, no hx of DM  Joint/Muscluskeltal: see HPI,   All other ROS are negative.     EXAM:   /90   Pulse 74   Resp 16   Ht 5' (1.524 m)   Wt 120 lb 1.6 oz (54.5 kg)   LMP 02/01/2016 (Exact Date)   BMI 23.46 kg/m²   HEENT: Clear oropharynx, no oral ulcers, EOM intact, clear sclear, PERRLA, pleasant, no acute distress, no CAD,  neck tendnerness, good ROM,   No rashes  CVS: RRR, no murmurs  RS: CTAB, no crackles, no rhonchi  ABD: Soft Non tender,   Joint exam:   Upper paracervical neck area tender.   Midline neck tender  - mild   Mild Tender in right shoulder - good rom and ttender in right paracervical area   Mild Tender in mid line lower back   SLR negative   Right si joint tender   Right hip buristis is not tender   Right knee not tender at this time   -good rom of her hips.   No edema      Component      Latest Ref Rng & Units 6/11/2018   Lupus Anticoag Screen Interp       Conclusion: Negative . . .   PT      11.8 - 14.5 seconds 12.5   APTT      23.2 - 35.3 seconds 28.7   Hexagonal Phase Staclot LA      Negative Negative   DRVVT Ratio      0.0 - 1.1 0.9   Cardiolipin IgG Antibody      0.0 - 14.9 GPL 4.4   Cardiolipin IgM Antibody      0.0 - 12.4 MPL 3.8   BETA 2 GLYCOPROTEIN 1 AB, IgM      <=15.0 U/mL <3.7   BETA 2 GLYCOPROTEIN 1 AB, IgG      <=15.0 U/mL <3.7   C-REACTIVE PROTEIN      0.0 - 0.9 mg/dL 0.5   SED RATE      0 - 30 mm/Hr 7   URIC ACID      2.1 - 7.4 mg/dL 4.0   CK      38 - 234 U/L 97   LEOLA SCREEN      Negative Negative   ALDOLASE, SERUM      1.5 - 8.1 U/L 2.1     Component      Latest Ref Rng & Units 11/5/2020   LEOLA Titer/Pattern      <80 80 (A)   Reviewed By:       Vanessa Dunbar M.D.   ALDOLASE, SERUM      1.5 - 8.1 U/L  4.0   CK      26 - 192 U/L 133   C-REACTIVE PROTEIN      <0.30 mg/dL 0.32 (H)   SED RATE      0 - 30 mm/Hr 10   LEOLA SCREEN      Negative Positive (A)   C-Citrullinated Peptide IgG AB      0.0 - 6.9 U/mL 1.1   HLA-B27      Negative Negative   RHEUMATOID FACTOR      <15 IU/mL <10   Anti-Sjogren's A      Negative Negative   Anti-Sjogren's B      Negative Negative     Component      Latest Ref Rng & Units 5/6/2021   WBC      4.0 - 11.0 x10(3) uL 13.6 (H)   RBC      3.80 - 5.30 x10(6)uL 4.54   Hemoglobin      12.0 - 16.0 g/dL 13.8   Hematocrit      35.0 - 48.0 % 42.6   MCV      80.0 - 100.0 fL 93.8   MCH      26.0 - 34.0 pg 30.4   MCHC      31.0 - 37.0 g/dL 32.4   RDW      11.0 - 15.0 % 13.6   RDW-SD      35.1 - 46.3 fL 47.0 (H)   Platelet Count      150.0 - 450.0 10(3)uL 155.0   LEOLA SCREEN WITH REFLEX (S)      Negative Negative     Component      Latest Ref Rng 11/8/2023   WBC      4.0 - 11.0 x10(3) uL 7.6    RBC      3.80 - 5.30 x10(6)uL 4.54    Hemoglobin      12.0 - 16.0 g/dL 14.2    Hematocrit      35.0 - 48.0 % 42.4    MCV      80.0 - 100.0 fL 93.4    MCH      26.0 - 34.0 pg 31.3    MCHC      31.0 - 37.0 g/dL 33.5    RDW-SD      35.1 - 46.3 fL 47.5 (H)    RDW      11.0 - 15.0 % 13.9    Platelet Count      150.0 - 450.0 10(3)uL 172.0    Prelim Neutrophil Abs      1.50 - 7.70 x10 (3) uL 4.24    Neutrophils Absolute      1.50 - 7.70 x10(3) uL 4.24    Lymphocytes Absolute      1.00 - 4.00 x10(3) uL 2.24    Monocytes Absolute      0.10 - 1.00 x10(3) uL 0.71    Eosinophils Absolute      0.00 - 0.70 x10(3) uL 0.27    Basophils Absolute      0.00 - 0.20 x10(3) uL 0.10    Immature Granulocyte Absolute      0.00 - 1.00 x10(3) uL 0.02    Neutrophils %      % 55.8    Lymphocytes %      % 29.6    Monocytes %      % 9.4    Eosinophils %      % 3.6    Basophils %      % 1.3    Immature Granulocyte %      % 0.3    Glucose      70 - 99 mg/dL 89    Sodium      136 - 145 mmol/L 142    Potassium      3.5 - 5.1 mmol/L 4.3    Chloride       98 - 112 mmol/L 110    Carbon Dioxide, Total      21.0 - 32.0 mmol/L 28.0    ANION GAP      0 - 18 mmol/L 4    BUN      9 - 23 mg/dL 26 (H)    CREATININE      0.55 - 1.02 mg/dL 0.90    BUN/CREATININE RATIO      10.0 - 20.0  28.9 (H)    CALCIUM      8.7 - 10.4 mg/dL 9.5    CALCULATED OSMOLALITY      275 - 295 mOsm/kg 298 (H)    EGFR      >=60 mL/min/1.73m2 73    ALT (SGPT)      10 - 49 U/L 19    AST (SGOT)      <=34 U/L 23    ALKALINE PHOSPHATASE      46 - 118 U/L 59    Total Bilirubin      0.2 - 1.1 mg/dL 0.6    PROTEIN, TOTAL      5.7 - 8.2 g/dL 7.1    Albumin      3.2 - 4.8 g/dL 4.2    Globulin      2.8 - 4.4 g/dL 2.9    A/G Ratio      1.0 - 2.0  1.4    Patient Fasting for CMP? No       Legend:  (H) High  Care everywhere -   1/20/2018 - DEXA  LUMBAR SPINE:  The AVG BMD OF the L1-L4 region = 0.837 gm/cm2, T-score =  -1.9,    Z-score =  -0.8.  Findings are consistent with osteopenia.         Femoral Necks:  The BMD of the left femoral neck =  0.635 gm/cm2, T-score =  -1.9,    Z-score =  -0.9.  The Findings are consistent with osteopenia.  The BMD of the right femoral neck =  0.723 gm/cm2, T-score =  -1.1,    Z-score =  -0.1.  The Findings are consistent with osteopenia.    1/1/5/2016 -   Sed rate is 15mm/hr,   1230/2015 - rf neg, ccp neg, izabel neg,   4/26/2018 - left shoulder MRI   1. Significant tendinosis distal supraspinatus and subscapularis tendons.  2. Partial-thickness tear distal supraspinatus along the articular margin     near the distal insertion. Similar findings to previous suspected     partial tear. Trace bursal fluid may be reactive.  3. Mild hypertrophic bony changes AC joint. Subchondral bony cystic changes     humeral head.  4. No other significant abnormalities    6/11/2018 - c spine xray   CONCLUSION:   1. Minimal osteoarthritis showing minimal progression from November 7, 2013.     6/11/2018 - right shoulder xray   CONCLUSION:   1.          Minimal osteoarthritis.    6/11/2018 - si joint xray    CONCLUSION:   1. Normal sacroiliac joints.  2. Transitional vertebra lumbosacral junction.      1/8/2020  Lumbar xray   CONCLUSION:   1. Minimal scoliosis.  2. Minimal osteoarthritis.  3. Status post splenectomy.    11/5/2020 - si joint xray   1. Unchanged study from previous of 1/8/2020 read demonstrating a transitional S1 vertebral body as discussed above with pseudoarthrosis of the upper aspect of the right SI joint at the level of S1.  Otherwise both SI joints are unremarkable without   evidence of narrowing, ankylosis or erosion.  No fracture.     11/18/2021  Mri si joints   Mild degenerative changes of the sacroiliac joints without sacroiliitis.         Transitional L5 vertebral body with a pseudoarthrosis within the right transverse process of L5 and the right sacral ala.     1/6/2023 - mri lumbar spine  1. L4-L5:  Small right paracentral/subarticular zone disc protrusion, which results in mild right lateral recess stenosis and slight effacement of the traversing right L5 nerve root; please correlate for right L5 radiculopathy.  Additional mild right and    minor left neural foraminal stenosis.   2. L3-L4:  Minor left neural foraminal stenosis.      3. Transitional lumbosacral anatomy with sacralization of L5 on the right.      4. There is a circumaortic left renal vein.      5. Lesser incidental findings as above.   ASSESSMENT AND PLAN:   Tami Noel is a 59 year old female who presents for   Chief Complaint   Patient presents with    Follow - Up    Back Pain     1. Increased back pain with mechanical back pain , s/p microdiskectomy in 11/2023 without benefit   Sometimes back pain - worse at night - also has musculoskelatal pains - hx of ITP, hx recurrent of miscarriages  - neck, shoulder pain, lower back pain   - labs negative for inflammatory arthritis and/or connective tissue disease   HLAB27 negative and no hx of psoirasis -   t work up is negative for sarcoillitis.    mri si joints no  sacroillilits - in the past   She has done several rounds of PT and injections without relief.   -  MRI lumbar spine  last one is 1/6/2023. -    si joint xray shows pseudoarthrosis on the right side with the sacrum   ? Piriformis syndrome is another possilblity.   Sudarshan back and body is what she is taking sparingy.   She using voltaren gel 1 % - topically - helping 25 % of the pain.   Ok to use cbd topical ointment     - cont flexeril 5-10mg at night - not using it. Much - prn   -advil helps the pain - 600mg a day -she know to take sparingly b/c of her hx of ITP -   rtc  prn     Had to get a steroid injection in the facet joint    -  si joint xray normal in 2018 -   Physical therapy for lower back -never helped her back -  it helped the shoulder and neck , her back still hurts.  - had to stop it b/c things got crazy, -   Flexeril 5-10mg at night prn -made her too drowsy      2. Hx of hep c - treated at [facility] 2015. With succuess  3. Hx of ITP in high school and had a splenectomy and was on high doses of prednisone for a while - normally avoid nsaids  - last bad episode was 2001 - pre delivery -   Last cbc in 7/2020 - normal cbc - platelets around 270K  4. Recent CVA 2020  - fu neurology and pcp - not on blood thinner - on aspirin - she's off plavix   5. Right knee meniscal tear sx   6. HTN - on metoprolol  7. Low titre izabel 1:80 - negative ssa and ssbs - repeat IZABEL negative     Summary:  1. Reviewed strategies for her back pain -   2. Return to clinic as needed.    - consider avoiding nsaids b/c of her ITP hx - will discuss on her next visit about this     Chelo Stevenson MD  9/4/2024   9:09 AM     is applicable because the patient's medical record notes reflects the ongoing nature of the continuous longitudinal relationship of care, and the medical record indicates that there is ongoing treatment of a serious/complex medical condition.

## 2024-09-05 ENCOUNTER — OFFICE VISIT (OUTPATIENT)
Dept: OBGYN CLINIC | Facility: CLINIC | Age: 59
End: 2024-09-05
Payer: COMMERCIAL

## 2024-09-05 VITALS
WEIGHT: 120 LBS | BODY MASS INDEX: 23.56 KG/M2 | HEIGHT: 60 IN | DIASTOLIC BLOOD PRESSURE: 85 MMHG | SYSTOLIC BLOOD PRESSURE: 169 MMHG | HEART RATE: 76 BPM

## 2024-09-05 DIAGNOSIS — Z12.4 SCREENING FOR MALIGNANT NEOPLASM OF CERVIX: ICD-10-CM

## 2024-09-05 DIAGNOSIS — Z13.820 SCREENING FOR OSTEOPOROSIS: ICD-10-CM

## 2024-09-05 DIAGNOSIS — Z12.31 SCREENING MAMMOGRAM FOR BREAST CANCER: ICD-10-CM

## 2024-09-05 DIAGNOSIS — Z01.411 ENCOUNTER FOR GYNECOLOGICAL EXAMINATION WITH ABNORMAL FINDING: Primary | ICD-10-CM

## 2024-09-05 PROCEDURE — 99396 PREV VISIT EST AGE 40-64: CPT | Performed by: OBSTETRICS & GYNECOLOGY

## 2024-09-05 NOTE — PROGRESS NOTES
Subjective:   Patient ID: Tami Noel is a 59 year old female.    HPI   and  with last menstrual period around 2016. Never used HRT. New personal medical issue with microdiscectomy L5-S1. UTD on colon screening.     History/Other:   Review of Systems   Constitutional:  Negative for appetite change, fatigue and unexpected weight change.   Eyes:  Negative for visual disturbance.   Respiratory:  Negative for cough and shortness of breath.    Cardiovascular:  Negative for chest pain, palpitations and leg swelling.   Gastrointestinal:  Negative for abdominal distention, abdominal pain, blood in stool, constipation and diarrhea.   Genitourinary:  Negative for dyspareunia, dysuria, frequency, pelvic pain and urgency.   Musculoskeletal:  Negative for arthralgias and myalgias.   Skin:  Negative for rash.   Neurological:  Positive for headaches (5 / wm mild and 5/ month migraines). Negative for weakness and numbness.   Psychiatric/Behavioral:  Negative for dysphoric mood. The patient is not nervous/anxious.      Current Outpatient Medications   Medication Sig Dispense Refill    metoprolol succinate ER 50 MG Oral Tablet 24 Hr Take 1 tablet (50 mg total) by mouth daily.      Multiple Vitamins-Minerals (MULTI-VITAMIN/MINERALS) Oral Tab Take 1 tablet by mouth daily.      Acetaminophen ER (MIDOL) 650 MG Oral Tab CR Take 1 tablet (650 mg total) by mouth every 8 (eight) hours as needed for Pain.      losartan 50 MG Oral Tab Take 1 tablet (50 mg total) by mouth 2 (two) times daily.      aspirin 81 MG Oral Chew Tab Chew 1 tablet (81 mg total) by mouth daily. 30 tablet 1    Multiple Minerals-Vitamins (CITRACAL PLUS OR) Take 1 tablet by mouth daily. Sylvester Cit-Mag-D3-Zn--Man-Bor 250- mg tablet      CALCIUM 1200 OR Take by mouth 2 (two) times daily. (Patient not taking: Reported on 2024)       Allergies:  Allergies   Allergen Reactions    Asa [Aspirin] OTHER (SEE COMMENTS)     Hx ITP    Sulfa Antibiotics HIVES      Other reaction(s): Rash       Objective:   Physical Exam  Constitutional:       General: She is not in acute distress.     Appearance: She is well-developed. She is not diaphoretic.   Neck:      Thyroid: No thyromegaly.   Cardiovascular:      Rate and Rhythm: Normal rate and regular rhythm.      Heart sounds: Normal heart sounds. No murmur heard.  Pulmonary:      Effort: Pulmonary effort is normal.      Breath sounds: Normal breath sounds. No wheezing or rales.   Chest:   Breasts:     Right: Normal. No mass, nipple discharge, skin change or tenderness.      Left: Normal. No mass, nipple discharge, skin change or tenderness.      Comments:     Abdominal:      General: There is no distension.      Palpations: Abdomen is soft. There is no mass.      Tenderness: There is no abdominal tenderness. There is no guarding or rebound.   Genitourinary:     Labia:         Right: No rash or lesion.         Left: No rash or lesion.       Vagina: Normal. No vaginal discharge.      Cervix: No cervical motion tenderness or discharge.      Uterus: Not enlarged and not tender.       Adnexa:         Right: No mass, tenderness or fullness.          Left: No mass, tenderness or fullness.     Musculoskeletal:         General: No tenderness.      Cervical back: Neck supple.   Lymphadenopathy:      Cervical: No cervical adenopathy.      Upper Body:      Right upper body: No supraclavicular, axillary or pectoral adenopathy.      Left upper body: No supraclavicular, axillary or pectoral adenopathy.   Neurological:      Mental Status: She is alert.         Assessment & Plan:   1. Encounter for gynecological examination with abnormal finding    2. Screening for malignant neoplasm of cervix    3. Screening mammogram for breast cancer    4. Screening for osteoporosis        Orders Placed This Encounter   Procedures    Hpv Dna  High Risk , Thin Prep Collect    ThinPrep PAP Smear    THINPREP PAP SMEAR ONLY       Meds This Visit:  Requested  Prescriptions      No prescriptions requested or ordered in this encounter       Imaging & Referrals:  [facility] DEBBIE 2D+3D SCREENING BILAT (CPT=[id number])

## 2024-09-06 LAB — HPV E6+E7 MRNA CVX QL NAA+PROBE: NEGATIVE

## 2024-10-23 ENCOUNTER — HOSPITAL ENCOUNTER (OUTPATIENT)
Dept: MAMMOGRAPHY | Age: 59
Discharge: HOME | End: 2024-10-23
Attending: OBSTETRICS & GYNECOLOGY
Payer: COMMERCIAL

## 2024-10-23 DIAGNOSIS — Z12.31 SCREENING MAMMOGRAM FOR BREAST CANCER: ICD-10-CM

## 2024-10-23 PROCEDURE — 77063 BREAST TOMOSYNTHESIS BI: CPT | Performed by: OBSTETRICS & GYNECOLOGY

## 2024-10-23 PROCEDURE — 77067 SCR MAMMO BI INCL CAD: CPT | Performed by: OBSTETRICS & GYNECOLOGY

## 2024-12-07 ENCOUNTER — HOSPITAL ENCOUNTER (OUTPATIENT)
Age: 59
Discharge: HOME | End: 2024-12-07
Payer: COMMERCIAL

## 2024-12-07 VITALS
SYSTOLIC BLOOD PRESSURE: 152 MMHG | HEART RATE: 74 BPM | TEMPERATURE: 98 F | DIASTOLIC BLOOD PRESSURE: 89 MMHG | OXYGEN SATURATION: 98 % | RESPIRATION RATE: 19 BRPM

## 2024-12-07 DIAGNOSIS — J06.9 VIRAL URI WITH COUGH: Primary | ICD-10-CM

## 2024-12-07 RX ORDER — ALBUTEROL SULFATE 90 UG/1
2 INHALANT RESPIRATORY (INHALATION) EVERY 4 HOURS PRN
Qty: 1 EACH | Refills: 0 | Status: SHIPPED | OUTPATIENT
Start: 2024-12-07 | End: 2025-01-06

## 2024-12-07 RX ORDER — BENZONATATE 200 MG/1
200 CAPSULE ORAL 3 TIMES DAILY PRN
Qty: 20 CAPSULE | Refills: 0 | Status: SHIPPED | OUTPATIENT
Start: 2024-12-07

## 2024-12-07 NOTE — ED PROVIDER NOTES
Patient Seen in: [facility]      History     Chief Complaint   Patient presents with    Cough/URI     Stated Complaint: Sinus Pain    Subjective:   HPI      Patient is a 59-year-old female with past medical history of hypertension, ITP, that presents to immediate care due to cough x 2 days.  Associate symptoms include sinus congestion rhinorrhea.  Patient states that cough has become more productive described as a chest heaviness only occurs with coughing.  Denying chest pain shortness of breath wheezing.  Denies fever sore throat ear pain.    Objective:     Past Medical History:    Back problem    Essential hypertension    Hepatitis C    resolved    High blood pressure    History of blood transfusion    ITP (idiopathic thrombocytopenic purpura)    Migraine    Osteopenia    Stroke (HCC)    no deficits    TIA (transient ischemic attack)              Past Surgical History:   Procedure Laterality Date      2001    x1    Colonoscopy  2014    Egd scope  10/08,     Sinus surgery    1991    Splenectomy      Us biopsy liver ([id number])  2012                Social History     Socioeconomic History    Marital status:    Tobacco Use    Smoking status: Never     Passive exposure: Never    Smokeless tobacco: Never   Vaping Use    Vaping status: Never Used   Substance and Sexual Activity    Alcohol use: Not Currently     Alcohol/week: 0.0 standard drinks of alcohol     Comment: RARELY    Drug use: No    Sexual activity: Yes     Partners: Male   Other Topics Concern    Caffeine Concern No              Review of Systems    Positive for stated complaint: Sinus Pain  Other systems are as noted in HPI.  Constitutional and vital signs reviewed.      All other systems reviewed and negative except as noted above.    Physical Exam     ED Triage Vitals   BP 24 1447 152/89   Pulse 24 1447 74   Resp 24 1445 19   Temp 24 1445 98.3 °F (36.8 °C)   Temp src 24 1445 Oral    SpO2 12/07/24 1445 98 %   O2 Device 12/07/24 1445 None (Room air)       Current Vitals:   Vital Signs  BP: 152/89  Pulse: 74  Resp: 19  Temp: 98.3 °F (36.8 °C)  Temp src: Oral    Oxygen Therapy  SpO2: 98 %  O2 Device: None (Room air)        Physical Exam  Vital signs reviewed. Nursing note reviewed.  Constitutional: Well-developed. Well-nourished. In no acute distress  HENT: Mucous membranes moist. TMs intact bilaterally. No trismus. Uvula midline. no posterior pharynx erythema.  No petechiae, exudates, or posterior pharynx edema.  EYES: No scleral icterus or conjunctival injection.  NECK: Full ROM. Supple.   CARDIAC: Normal rate. Normal S1/ S2. 2+ distal pulses. No edema  PULM/CHEST: Clear to auscultation bilaterally. No wheezes  Extremities: Full ROM  NEURO: Awake, alert, following commands, moving extremities, answering questions.   SKIN: Warm and dry. No rash or lesions.  PSYCH: Normal judgment. Normal affect.               MDM      Patient is a 59-year-old female who presents to immediate care due to cough x 2 days.  Patient arrives with stable vitals speaking complete sentences in no respiratory distress.  Physical exam unremarkable with lungs clear to auscultation.  Most likely viral URI, acute cough, acute sinusitis.  Considered testing for COVID-19 and influenza however patient declined at this time.  Less likely bacterial sinusitis, pneumonia.  Consider chest x-ray however patient declined at this time.  Discussed supportive treatment including Tylenol and ibuprofen as needed.  Antihistamine such as Claritin or Zyrtec.  Return precautions including worsening cough, fever chest pain shortness of breath.  History given by patient.  Patient agreeable to plan all questions answered.          Medical Decision Making      Disposition and Plan     Clinical Impression:  1. Viral URI with cough         Disposition:  Discharge  12/7/2024  3:01 pm    Follow-up:  Nikko Leyva  [address]  [facility] [address]  [phone]    Call             Medications Prescribed:  Discharge Medication List as of 12/7/2024  3:01 PM        START taking these medications    Details   albuterol 108 (90 Base) MCG/ACT Inhalation Aero Soln Inhale 2 puffs into the lungs every 4 (four) hours as needed for Wheezing., Normal, Disp-1 each, R-0      benzonatate 200 MG Oral Cap Take 1 capsule (200 mg total) by mouth 3 (three) times daily as needed for cough., Normal, Disp-20 capsule, R-0                 Supplementary Documentation:

## 2024-12-10 ENCOUNTER — TELEPHONE (OUTPATIENT)
Dept: FAMILY MEDICINE CLINIC | Facility: CLINIC | Age: 59
End: 2024-12-10

## 2024-12-10 NOTE — TELEPHONE ENCOUNTER
L4-5 minimally invasive transforaminal lumbar interbody fusion on 01/10/25 with Dr. Pena @ [facility]    H&P- Completed 12/11/24 with Dr. Pancho Prabhakar  Labs- PLT (103), BUN (26), BUN/Creat ratio (24.5), creatinine (1.06), Calc Osmo (302), +1 blood in urine, MRSA neg, all other labs WNL  EKG- normal sinus rhythm, possible left atrial enlargement. Previous EKG 11/08/23  CXR- WNL    Cardiology- Dr. Grant Padilla  Last seen: 09/25/24  No Cardiac Clx needed per JA also hold ASA 1 week prior to surgery.

## 2024-12-11 ENCOUNTER — OFFICE VISIT (OUTPATIENT)
Dept: FAMILY MEDICINE CLINIC | Facility: CLINIC | Age: 59
End: 2024-12-11
Payer: COMMERCIAL

## 2024-12-11 ENCOUNTER — LAB ENCOUNTER (OUTPATIENT)
Dept: LAB | Facility: HOSPITAL | Age: 59
End: 2024-12-11
Attending: FAMILY MEDICINE
Payer: COMMERCIAL

## 2024-12-11 VITALS
SYSTOLIC BLOOD PRESSURE: 122 MMHG | RESPIRATION RATE: 16 BRPM | HEIGHT: 60 IN | WEIGHT: 119 LBS | HEART RATE: 76 BPM | OXYGEN SATURATION: 98 % | TEMPERATURE: 97 F | BODY MASS INDEX: 23.36 KG/M2 | DIASTOLIC BLOOD PRESSURE: 82 MMHG

## 2024-12-11 DIAGNOSIS — Z01.812 PRE-OPERATIVE LABORATORY EXAMINATION: ICD-10-CM

## 2024-12-11 DIAGNOSIS — M48.061 SPINAL STENOSIS OF LUMBAR REGION, UNSPECIFIED WHETHER NEUROGENIC CLAUDICATION PRESENT: ICD-10-CM

## 2024-12-11 DIAGNOSIS — I10 PRIMARY HYPERTENSION: ICD-10-CM

## 2024-12-11 DIAGNOSIS — Z01.818 PREOP EXAMINATION: Primary | ICD-10-CM

## 2024-12-11 DIAGNOSIS — Z86.73 HISTORY OF CVA (CEREBROVASCULAR ACCIDENT): ICD-10-CM

## 2024-12-11 DIAGNOSIS — Z01.818 PREOP EXAMINATION: ICD-10-CM

## 2024-12-11 PROCEDURE — 99213 OFFICE O/P EST LOW 20 MIN: CPT | Performed by: FAMILY MEDICINE

## 2024-12-11 RX ORDER — METHYLCELLULOSE 500 MG/1
6 TABLET ORAL 2 TIMES DAILY
COMMUNITY

## 2024-12-11 RX ORDER — AMLODIPINE BESYLATE 2.5 MG/1
2.5 TABLET ORAL NIGHTLY
COMMUNITY

## 2024-12-12 NOTE — H&P
HPI:                                                                                                                                           PRE-OP NOTE  HISTORY AND PHYSICAL                                                                                                                                                                                                                                         I have been consulted by Dr. Pena to see Tami Noel 59 year old female for a preoperative evaluation and medical clearance. Tami has worsening severe degenerative lumbar spinal stenosis. She is scheduled to have L4-5 minimally invasive TLIF by Dr. Pena on 1/10/25.     Pt suffers significant pain and loss of function.     No cardiopulmonary symptoms. History of CVA remote past associated with carotid artery dissection. She has been very stable and has seen cardiology. She remains on aspirin and blood pressure meds and is doing well.      No history of GARTH or DVT. Denies tobacco use.     Family History   Problem Relation Age of Onset    Hypertension Father     Other (Other) Father         arthritis    Stroke Mother     Hypertension Mother     Breast Cancer Paternal Grandmother         77 for 2nd reoccurrance      Current Outpatient Medications   Medication Sig Dispense Refill    Methylcellulose, Laxative, (CITRUCEL) 500 MG Oral Tab Take 6 tablets by mouth in the morning and 6 tablets before bedtime.      amLODIPine 2.5 MG Oral Tab Take 1 tablet (2.5 mg total) by mouth nightly.      metoprolol succinate ER 50 MG Oral Tablet 24 Hr Take 1 tablet (50 mg total) by mouth daily.      Multiple Vitamins-Minerals (MULTI-VITAMIN/MINERALS) Oral Tab Take 1 tablet by mouth daily.      Acetaminophen ER (MIDOL) 650 MG Oral Tab CR Take 1 tablet (650 mg total) by mouth every 8 (eight) hours as needed for Pain.      CALCIUM 1200 OR Take by mouth 2 (two) times daily.      losartan 50 MG Oral Tab Take 1 tablet (50 mg total)  by mouth 2 (two) times daily.      aspirin 81 MG Oral Chew Tab Chew 1 tablet (81 mg total) by mouth daily. 30 tablet 1     Past Medical History:    Back problem    lumbar back pain    Hepatitis C    caused by blood transfusion- since resolved    High blood pressure    History of blood transfusion    ITP- no reaction    ITP (idiopathic thrombocytopenic purpura)    Migraine    Osteopenia    TIA (transient ischemic attack)     Past Surgical History:   Procedure Laterality Date      2001    x1    Colonoscopy      2024    Egd scope  10/08,     Sinus surgery    1991    Splenectomy      Us biopsy liver ([id number])  2012     Social History     Socioeconomic History    Marital status:      Spouse name: Not on file    Number of children: Not on file    Years of education: Not on file    Highest education level: Not on file   Occupational History    Not on file   Tobacco Use    Smoking status: Never     Passive exposure: Never    Smokeless tobacco: Never   Vaping Use    Vaping status: Never Used   Substance and Sexual Activity    Alcohol use: Not Currently     Alcohol/week: 0.0 standard drinks of alcohol     Comment: RARELY    Drug use: No    Sexual activity: Yes     Partners: Male   Other Topics Concern     Service Not Asked    Blood Transfusions Not Asked    Caffeine Concern No    Occupational Exposure Not Asked    Hobby Hazards Not Asked    Sleep Concern Not Asked    Stress Concern Not Asked    Weight Concern Not Asked    Special Diet Not Asked    Back Care Not Asked    Exercise Not Asked    Bike Helmet Not Asked    Seat Belt Not Asked    Self-Exams Not Asked   Social History Narrative    Not on file     Social Drivers of Health     Financial Resource Strain: Not on file   Food Insecurity: Not on file   Transportation Needs: Not on file   Physical Activity: Not on file   Stress: Not on file   Social Connections: Not on file   Housing Stability: Not on file       REVIEW OF SYSTEMS:    CONSTITUTIONAL:  Denies unusual weight gain/loss, fever, chills  EENT:  Eyes:  Denies eye pain, visual loss, blurred vision, double vision. Ears, Nose, Throat:  Denies congestion, runny nose or sore throat.  INTEGUMENTARY:  Denies rashes, itching, skin lesion,   CARDIOVASCULAR:  Denies DVT. Denies chest pain, palpitations, edema, dyspnea  RESPIRATORY: no GARTH ,Denies shortness of breath, wheezing, cough  GASTROINTESTINAL:  Denies abdominal pain, nausea, vomiting, constipation, diarrhea, or blood in stool.  MUSCULOSKELETAL:  severe pain as noted above  NEUROLOGICAL:  Denies headache, seizures, dizziness, syncope, paralysis, ataxia,  HEMATOLOGIC:  Denies anemia, bleeding or bruising.  LYMPHATICS:  Denies enlarged nodes   PSYCHIATRIC:  Denies depression or anxiety.  ENDOCRINOLOGIC: DM 2 no  ALLERGIES:  Denies allergic response, history of asthma, hives,     EXAM:   /82 (BP Location: Left arm)   Pulse 76   Temp 97.2 °F (36.2 °C) (Temporal)   Resp 16   Ht 5' (1.524 m)   Wt 119 lb (54 kg)   LMP 02/01/2016 (Exact Date)   SpO2 98%   BMI 23.24 kg/m²  Estimated body mass index is 23.24 kg/m² as calculated from the following:    Height as of this encounter: 5' (1.524 m).    Weight as of this encounter: 119 lb (54 kg).   Vital signs reviewed.Appears stated age, well groomed.  Physical Exam:  GEN:  Patient is alert, awake and oriented, well developed, well nourished, no apparent distress.  HEENT:  Head:  Normocephalic, atraumatic Eyes: EOMI, no scleral icterus, conjunctivae clear bilaterally, no eye discharge Nose: patent, no nasal discharge   SKIN: No rashes, no skin lesion, no bruising, good turgor.  HEART:  Regular rate and rhythm, no murmurs, rubs or gallops.  LUNGS: Clear to auscultation bilterally, no rales/rhonchi/wheezing.  CHEST: No tenderness.  ABDOMEN:  Soft, nondistended, nontender,  no masses, no hepatosplenomegaly.  BACK: No tenderness, no spasm,   EXTREMITIES:  No edema, no cyanosis, no clubbing,    NEURO:  No focal deficit, speech fluent, normal gait, strength and tone, sensory intact      Lab Results   Component Value Date    WBC 7.6 11/08/2023    HGB 14.2 11/08/2023    HCT 42.4 11/08/2023    .0 11/08/2023    CREATSERUM 0.90 11/08/2023    BUN 26 (H) 11/08/2023     11/08/2023    K 4.3 11/08/2023     11/08/2023    CO2 28.0 11/08/2023    GLU 89 11/08/2023    CA 9.5 11/08/2023    ALB 4.2 11/08/2023    ALKPHO 59 11/08/2023    BILT 0.6 11/08/2023    TP 7.1 11/08/2023    AST 23 11/08/2023    ALT 19 11/08/2023    PTT 27.5 11/08/2023    INR 0.92 11/08/2023    TSH 2.310 09/05/2023    ESRML 12 12/30/2022    CRP <0.29 12/30/2022     11/05/2020    B12 567 07/19/2020       No results found.          ASSESSMENT AND PLAN:   Tami Noel is a 59 year old female, with worsening severe degenerative lumbar spinal stenosis. She is scheduled to have L4-5 minimally invasive TLIF by Dr. Pena on 1/10/25.        3. Spinal stenosis of lumbar region, unspecified whether neurogenic claudication present  M48.061       4. History of CVA (cerebrovascular accident)  Z86.73       5. Primary hypertension  I10         labs have been reviewed and are in acceptable range for surgery.     Preoperative Risk Stratification: There are no decompensated medical conditions. ASA classification 2      Patient is low risk for major cardiac event in the perioperative period.     Patient is medically optimized and has an acceptable risk of surgery and may proceed with surgery as planned.     PLAN:    Patient to discontinue medications and supplements with anticoagulation properties as per instruction.       Postoperative Recommendations:    Anticoagulation / DVT prophylaxis: SCDs, as per Dr. Pena. Early ambulation   GI protection: Protonix  Incentive Spirometry   Telemetry as needed  CPAP/O2 as needed       Pain management and Physical therapy as per Orthopedic service.   Home Health as needed    Thank you for the  opportunity to care for your patient and to assist in managing the postoperative course.        Pancho Prabhakar MD  12/11/2024  7:59 PM

## 2024-12-19 ENCOUNTER — LAB ENCOUNTER (OUTPATIENT)
Dept: LAB | Facility: HOSPITAL | Age: 59
End: 2024-12-19
Attending: FAMILY MEDICINE
Payer: COMMERCIAL

## 2024-12-19 ENCOUNTER — HOSPITAL ENCOUNTER (OUTPATIENT)
Dept: GENERAL RADIOLOGY | Facility: HOSPITAL | Age: 59
Discharge: HOME | End: 2024-12-19
Attending: FAMILY MEDICINE
Payer: COMMERCIAL

## 2024-12-19 DIAGNOSIS — Z01.818 PREOP EXAMINATION: ICD-10-CM

## 2024-12-19 DIAGNOSIS — Z01.812 PRE-OPERATIVE LABORATORY EXAMINATION: ICD-10-CM

## 2024-12-19 LAB
ALBUMIN SERPL-MCNC: 4.5 G/DL (ref 3.2–4.8)
ALBUMIN/GLOB SERPL: 1.4 {RATIO} (ref 1–2)
ALP LIVER SERPL-CCNC: 65 U/L
ALT SERPL-CCNC: 22 U/L
ANION GAP SERPL CALC-SCNC: 7 MMOL/L (ref 0–18)
ANTIBODY SCREEN: NEGATIVE
APTT PPP: 28.1 SECONDS (ref 23–36)
AST SERPL-CCNC: 24 U/L (ref ?–34)
ATRIAL RATE: 65 BPM
ATRIAL RATE: 67 BPM
BASOPHILS # BLD AUTO: 0.15 X10(3) UL (ref 0–0.2)
BASOPHILS NFR BLD AUTO: 2.2 %
BILIRUB SERPL-MCNC: 0.8 MG/DL (ref 0.2–1.1)
BILIRUB UR QL: NEGATIVE
BUN BLD-MCNC: 26 MG/DL (ref 9–23)
BUN/CREAT SERPL: 24.5 (ref 10–20)
CALCIUM BLD-MCNC: 10 MG/DL (ref 8.7–10.4)
CHLORIDE SERPL-SCNC: 106 MMOL/L (ref 98–112)
CLARITY UR: CLEAR
CO2 SERPL-SCNC: 31 MMOL/L (ref 21–32)
CREAT BLD-MCNC: 1.06 MG/DL
DEPRECATED RDW RBC AUTO: 45.3 FL (ref 35.1–46.3)
EGFRCR SERPLBLD CKD-EPI 2021: 60 ML/MIN/1.73M2 (ref 60–?)
EOSINOPHIL # BLD AUTO: 0.34 X10(3) UL (ref 0–0.7)
EOSINOPHIL NFR BLD AUTO: 4.9 %
ERYTHROCYTE [DISTWIDTH] IN BLOOD BY AUTOMATED COUNT: 13.2 % (ref 11–15)
FASTING STATUS PATIENT QL REPORTED: YES
GLOBULIN PLAS-MCNC: 3.2 G/DL (ref 2–3.5)
GLUCOSE BLD-MCNC: 90 MG/DL (ref 70–99)
GLUCOSE UR-MCNC: NORMAL MG/DL
HCT VFR BLD AUTO: 43.8 %
HGB BLD-MCNC: 14.9 G/DL
IMM GRANULOCYTES # BLD AUTO: 0.01 X10(3) UL (ref 0–1)
IMM GRANULOCYTES NFR BLD: 0.1 %
INR BLD: 0.91 (ref 0.8–1.2)
KETONES UR-MCNC: NEGATIVE MG/DL
LEUKOCYTE ESTERASE UR QL STRIP.AUTO: NEGATIVE
LYMPHOCYTES # BLD AUTO: 2.31 X10(3) UL (ref 1–4)
LYMPHOCYTES NFR BLD AUTO: 33.6 %
MCH RBC QN AUTO: 31.3 PG (ref 26–34)
MCHC RBC AUTO-ENTMCNC: 34 G/DL (ref 31–37)
MCV RBC AUTO: 92 FL
MONOCYTES # BLD AUTO: 0.64 X10(3) UL (ref 0.1–1)
MONOCYTES NFR BLD AUTO: 9.3 %
NEUTROPHILS # BLD AUTO: 3.42 X10 (3) UL (ref 1.5–7.7)
NEUTROPHILS # BLD AUTO: 3.42 X10(3) UL (ref 1.5–7.7)
NEUTROPHILS NFR BLD AUTO: 49.9 %
NITRITE UR QL STRIP.AUTO: NEGATIVE
OSMOLALITY SERPL CALC.SUM OF ELEC: 302 MOSM/KG (ref 275–295)
P AXIS: 21 DEGREES
P AXIS: 28 DEGREES
P-R INTERVAL: 176 MS
P-R INTERVAL: 178 MS
PH UR: 5.5 [PH] (ref 5–8)
PLATELET # BLD AUTO: 103 10(3)UL (ref 150–450)
PLATELETS.RETICULATED NFR BLD AUTO: 6.6 % (ref 0–7)
POTASSIUM SERPL-SCNC: 4.1 MMOL/L (ref 3.5–5.1)
PROT SERPL-MCNC: 7.7 G/DL (ref 5.7–8.2)
PROT UR-MCNC: NEGATIVE MG/DL
PROTHROMBIN TIME: 12.8 SECONDS (ref 11.6–14.8)
Q-T INTERVAL: 396 MS
Q-T INTERVAL: 414 MS
QRS DURATION: 86 MS
QRS DURATION: 86 MS
QTC CALCULATION (BEZET): 418 MS
QTC CALCULATION (BEZET): 430 MS
R AXIS: 1 DEGREES
R AXIS: 8 DEGREES
RBC # BLD AUTO: 4.76 X10(6)UL
RH BLOOD TYPE: POSITIVE
SODIUM SERPL-SCNC: 144 MMOL/L (ref 136–145)
SP GR UR STRIP: 1.01 (ref 1–1.03)
T AXIS: 27 DEGREES
T AXIS: 28 DEGREES
UROBILINOGEN UR STRIP-ACNC: NORMAL
VENTRICULAR RATE: 65 BPM
VENTRICULAR RATE: 67 BPM
WBC # BLD AUTO: 6.9 X10(3) UL (ref 4–11)

## 2024-12-19 PROCEDURE — 36415 COLL VENOUS BLD VENIPUNCTURE: CPT

## 2024-12-19 PROCEDURE — 86900 BLOOD TYPING SEROLOGIC ABO: CPT | Performed by: FAMILY MEDICINE

## 2024-12-19 PROCEDURE — 71046 X-RAY EXAM CHEST 2 VIEWS: CPT | Performed by: FAMILY MEDICINE

## 2024-12-19 PROCEDURE — 85730 THROMBOPLASTIN TIME PARTIAL: CPT

## 2024-12-19 PROCEDURE — 86850 RBC ANTIBODY SCREEN: CPT | Performed by: FAMILY MEDICINE

## 2024-12-19 PROCEDURE — 87081 CULTURE SCREEN ONLY: CPT

## 2024-12-19 PROCEDURE — 86901 BLOOD TYPING SEROLOGIC RH(D): CPT | Performed by: FAMILY MEDICINE

## 2024-12-19 PROCEDURE — 93010 ELECTROCARDIOGRAM REPORT: CPT | Performed by: STUDENT IN AN ORGANIZED HEALTH CARE EDUCATION/TRAINING PROGRAM

## 2024-12-19 PROCEDURE — 85025 COMPLETE CBC W/AUTO DIFF WBC: CPT

## 2024-12-19 PROCEDURE — 81001 URINALYSIS AUTO W/SCOPE: CPT

## 2024-12-19 PROCEDURE — 80053 COMPREHEN METABOLIC PANEL: CPT

## 2024-12-19 PROCEDURE — 85610 PROTHROMBIN TIME: CPT

## 2024-12-19 PROCEDURE — 93005 ELECTROCARDIOGRAM TRACING: CPT

## 2024-12-19 NOTE — TELEPHONE ENCOUNTER
I recommend repeating cbc to recheck platelet count, which has changed from a year ago. If same or better ok for manuelito. If continues to decline additional testing may be necessary. Recommend doing testing soon. Clearance pending results.

## 2024-12-19 NOTE — TELEPHONE ENCOUNTER
Dr. Prabhakar, please review:    Labs- PLT (103), BUN (26), BUN/Creat ratio (24.5), creatinine (1.06), Calc Osmo (302), +1 blood in urine, MRSA neg, all other labs WNL    EKG- normal sinus rhythm, possible left atrial enlargement. Previous EKG 11/08/23    CXR- WNL    OK for surgery?

## 2024-12-20 NOTE — TELEPHONE ENCOUNTER
Spoke to patient, let her know I spoke with Dr. Prabhakar about low platelet count and that patient has been diagnosed with ITP. Per Dr. Prabhakar, no need for repeat platelet count. She should hold ASA and ibuprofen 1 week prior to surgery.

## 2025-01-09 ENCOUNTER — ANESTHESIA EVENT (OUTPATIENT)
Dept: SURGERY | Facility: HOSPITAL | Age: 60
End: 2025-01-09
Payer: COMMERCIAL

## 2025-01-10 ENCOUNTER — ANESTHESIA (OUTPATIENT)
Dept: SURGERY | Facility: HOSPITAL | Age: 60
End: 2025-01-10
Payer: COMMERCIAL

## 2025-01-10 ENCOUNTER — APPOINTMENT (OUTPATIENT)
Dept: GENERAL RADIOLOGY | Facility: HOSPITAL | Age: 60
End: 2025-01-10
Attending: ORTHOPAEDIC SURGERY
Payer: COMMERCIAL

## 2025-01-10 ENCOUNTER — HOSPITAL ENCOUNTER (INPATIENT)
Facility: HOSPITAL | Age: 60
LOS: 3 days | Discharge: HOME | End: 2025-01-13
Attending: ORTHOPAEDIC SURGERY | Admitting: ORTHOPAEDIC SURGERY
Payer: COMMERCIAL

## 2025-01-10 PROBLEM — M54.16 LUMBAR RADICULOPATHY: Status: ACTIVE | Noted: 2023-11-09

## 2025-01-10 PROBLEM — Z98.1 S/P LUMBAR FUSION: Status: ACTIVE | Noted: 2025-01-10

## 2025-01-10 PROCEDURE — 76000 FLUOROSCOPY <1 HR PHYS/QHP: CPT | Performed by: ORTHOPAEDIC SURGERY

## 2025-01-10 PROCEDURE — 4A11X4G MONITORING OF PERIPHERAL NERVOUS ELECTRICAL ACTIVITY, INTRAOPERATIVE, EXTERNAL APPROACH: ICD-10-PCS | Performed by: ANESTHESIOLOGY

## 2025-01-10 PROCEDURE — 0SG00AJ FUSION OF LUMBAR VERTEBRAL JOINT WITH INTERBODY FUSION DEVICE, POSTERIOR APPROACH, ANTERIOR COLUMN, OPEN APPROACH: ICD-10-PCS | Performed by: ORTHOPAEDIC SURGERY

## 2025-01-10 PROCEDURE — 99222 1ST HOSP IP/OBS MODERATE 55: CPT | Performed by: HOSPITALIST

## 2025-01-10 PROCEDURE — 0SB20ZZ EXCISION OF LUMBAR VERTEBRAL DISC, OPEN APPROACH: ICD-10-PCS | Performed by: ORTHOPAEDIC SURGERY

## 2025-01-10 DEVICE — K WIRE FIX NIT BVL BLNT TIP PRECEPT: Type: IMPLANTABLE DEVICE | Site: BACK

## 2025-01-10 DEVICE — COHERE TLIF-O, 12X10X25MM 4°
Type: IMPLANTABLE DEVICE | Site: BACK | Status: FUNCTIONAL
Brand: COHERE

## 2025-01-10 DEVICE — GRAFT BNE SUB MTRX C OSTEOCEL PRO: Type: IMPLANTABLE DEVICE | Site: BACK | Status: FUNCTIONAL

## 2025-01-10 DEVICE — BONE GRAFT KIT 7510100 INFUSE X SMALL
Type: IMPLANTABLE DEVICE | Site: BACK | Status: FUNCTIONAL
Brand: INFUSE® BONE GRAFT

## 2025-01-10 DEVICE — SCREW SPNL 5.5MM LOK OPN TULIP RELINE: Type: IMPLANTABLE DEVICE | Site: BACK | Status: FUNCTIONAL

## 2025-01-10 DEVICE — ROD SPNL 5.5X40MM LORD TI RELINE MAS: Type: IMPLANTABLE DEVICE | Site: BACK | Status: FUNCTIONAL

## 2025-01-10 DEVICE — SCREW SPNL 6.5X40MM 2C REDUC POLYAX RELINE: Type: IMPLANTABLE DEVICE | Site: BACK | Status: FUNCTIONAL

## 2025-01-10 RX ORDER — ONDANSETRON 2 MG/ML
INJECTION INTRAMUSCULAR; INTRAVENOUS AS NEEDED
Status: DISCONTINUED | OUTPATIENT
Start: 2025-01-10 | End: 2025-01-10 | Stop reason: SURG

## 2025-01-10 RX ORDER — AMLODIPINE BESYLATE 2.5 MG/1
2.5 TABLET ORAL NIGHTLY
Status: DISCONTINUED | OUTPATIENT
Start: 2025-01-10 | End: 2025-01-13

## 2025-01-10 RX ORDER — MORPHINE SULFATE 4 MG/ML
2 INJECTION, SOLUTION INTRAMUSCULAR; INTRAVENOUS EVERY 10 MIN PRN
Status: DISCONTINUED | OUTPATIENT
Start: 2025-01-10 | End: 2025-01-10 | Stop reason: HOSPADM

## 2025-01-10 RX ORDER — OXYCODONE HYDROCHLORIDE 5 MG/1
10 TABLET ORAL EVERY 4 HOURS PRN
Status: DISCONTINUED | OUTPATIENT
Start: 2025-01-10 | End: 2025-01-13

## 2025-01-10 RX ORDER — NALOXONE HYDROCHLORIDE 0.4 MG/ML
0.08 INJECTION, SOLUTION INTRAMUSCULAR; INTRAVENOUS; SUBCUTANEOUS ONCE AS NEEDED
OUTPATIENT
Start: 2025-01-10 | End: 2025-01-10

## 2025-01-10 RX ORDER — DIPHENHYDRAMINE HYDROCHLORIDE 50 MG/ML
25 INJECTION INTRAMUSCULAR; INTRAVENOUS EVERY 4 HOURS PRN
Status: DISCONTINUED | OUTPATIENT
Start: 2025-01-10 | End: 2025-01-13

## 2025-01-10 RX ORDER — ROCURONIUM BROMIDE 10 MG/ML
INJECTION, SOLUTION INTRAVENOUS AS NEEDED
Status: DISCONTINUED | OUTPATIENT
Start: 2025-01-10 | End: 2025-01-10 | Stop reason: SURG

## 2025-01-10 RX ORDER — SODIUM CHLORIDE 9 MG/ML
INJECTION, SOLUTION INTRAVENOUS CONTINUOUS
Status: DISCONTINUED | OUTPATIENT
Start: 2025-01-10 | End: 2025-01-13

## 2025-01-10 RX ORDER — HYDROMORPHONE HYDROCHLORIDE 1 MG/ML
0.6 INJECTION, SOLUTION INTRAMUSCULAR; INTRAVENOUS; SUBCUTANEOUS EVERY 5 MIN PRN
Status: DISCONTINUED | OUTPATIENT
Start: 2025-01-10 | End: 2025-01-10 | Stop reason: HOSPADM

## 2025-01-10 RX ORDER — NALOXONE HYDROCHLORIDE 0.4 MG/ML
80 INJECTION, SOLUTION INTRAMUSCULAR; INTRAVENOUS; SUBCUTANEOUS AS NEEDED
Status: DISCONTINUED | OUTPATIENT
Start: 2025-01-10 | End: 2025-01-10 | Stop reason: HOSPADM

## 2025-01-10 RX ORDER — MORPHINE SULFATE 4 MG/ML
4 INJECTION, SOLUTION INTRAMUSCULAR; INTRAVENOUS EVERY 10 MIN PRN
Status: DISCONTINUED | OUTPATIENT
Start: 2025-01-10 | End: 2025-01-10 | Stop reason: HOSPADM

## 2025-01-10 RX ORDER — TIZANIDINE 2 MG/1
2 TABLET ORAL EVERY 8 HOURS PRN
Status: DISCONTINUED | OUTPATIENT
Start: 2025-01-10 | End: 2025-01-13

## 2025-01-10 RX ORDER — METOPROLOL TARTRATE 1 MG/ML
2.5 INJECTION, SOLUTION INTRAVENOUS ONCE
Status: DISCONTINUED | OUTPATIENT
Start: 2025-01-10 | End: 2025-01-10 | Stop reason: HOSPADM

## 2025-01-10 RX ORDER — HYDROMORPHONE HYDROCHLORIDE 1 MG/ML
0.4 INJECTION, SOLUTION INTRAMUSCULAR; INTRAVENOUS; SUBCUTANEOUS EVERY 5 MIN PRN
Status: DISCONTINUED | OUTPATIENT
Start: 2025-01-10 | End: 2025-01-10 | Stop reason: HOSPADM

## 2025-01-10 RX ORDER — SODIUM CHLORIDE, SODIUM LACTATE, POTASSIUM CHLORIDE, CALCIUM CHLORIDE 600; 310; 30; 20 MG/100ML; MG/100ML; MG/100ML; MG/100ML
INJECTION, SOLUTION INTRAVENOUS CONTINUOUS
Status: DISCONTINUED | OUTPATIENT
Start: 2025-01-10 | End: 2025-01-10 | Stop reason: HOSPADM

## 2025-01-10 RX ORDER — PROCHLORPERAZINE EDISYLATE 5 MG/ML
5 INJECTION INTRAMUSCULAR; INTRAVENOUS EVERY 8 HOURS PRN
Status: DISCONTINUED | OUTPATIENT
Start: 2025-01-10 | End: 2025-01-13

## 2025-01-10 RX ORDER — BISACODYL 10 MG
10 SUPPOSITORY, RECTAL RECTAL
Status: DISCONTINUED | OUTPATIENT
Start: 2025-01-10 | End: 2025-01-13

## 2025-01-10 RX ORDER — SODIUM CHLORIDE, SODIUM LACTATE, POTASSIUM CHLORIDE, CALCIUM CHLORIDE 600; 310; 30; 20 MG/100ML; MG/100ML; MG/100ML; MG/100ML
INJECTION, SOLUTION INTRAVENOUS CONTINUOUS
Status: DISCONTINUED | OUTPATIENT
Start: 2025-01-10 | End: 2025-01-10

## 2025-01-10 RX ORDER — HYDROMORPHONE HYDROCHLORIDE 1 MG/ML
0.2 INJECTION, SOLUTION INTRAMUSCULAR; INTRAVENOUS; SUBCUTANEOUS EVERY 5 MIN PRN
Status: DISCONTINUED | OUTPATIENT
Start: 2025-01-10 | End: 2025-01-10 | Stop reason: HOSPADM

## 2025-01-10 RX ORDER — POLYETHYLENE GLYCOL 3350 17 G/17G
17 POWDER, FOR SOLUTION ORAL DAILY PRN
Status: DISCONTINUED | OUTPATIENT
Start: 2025-01-10 | End: 2025-01-13

## 2025-01-10 RX ORDER — DIPHENHYDRAMINE HCL 25 MG
25 CAPSULE ORAL EVERY 4 HOURS PRN
Status: DISCONTINUED | OUTPATIENT
Start: 2025-01-10 | End: 2025-01-13

## 2025-01-10 RX ORDER — MIDAZOLAM HYDROCHLORIDE 1 MG/ML
INJECTION INTRAMUSCULAR; INTRAVENOUS AS NEEDED
Status: DISCONTINUED | OUTPATIENT
Start: 2025-01-10 | End: 2025-01-10 | Stop reason: SURG

## 2025-01-10 RX ORDER — CEFAZOLIN SODIUM 1 G/3ML
INJECTION, POWDER, FOR SOLUTION INTRAMUSCULAR; INTRAVENOUS AS NEEDED
Status: DISCONTINUED | OUTPATIENT
Start: 2025-01-10 | End: 2025-01-10 | Stop reason: SURG

## 2025-01-10 RX ORDER — METOPROLOL SUCCINATE 50 MG/1
50 TABLET, EXTENDED RELEASE ORAL DAILY
Status: DISCONTINUED | OUTPATIENT
Start: 2025-01-11 | End: 2025-01-13

## 2025-01-10 RX ORDER — SODIUM CHLORIDE, SODIUM LACTATE, POTASSIUM CHLORIDE, CALCIUM CHLORIDE 600; 310; 30; 20 MG/100ML; MG/100ML; MG/100ML; MG/100ML
INJECTION, SOLUTION INTRAVENOUS CONTINUOUS
OUTPATIENT
Start: 2025-01-10

## 2025-01-10 RX ORDER — HYDROMORPHONE HYDROCHLORIDE 1 MG/ML
0.4 INJECTION, SOLUTION INTRAMUSCULAR; INTRAVENOUS; SUBCUTANEOUS EVERY 2 HOUR PRN
Status: DISCONTINUED | OUTPATIENT
Start: 2025-01-10 | End: 2025-01-13

## 2025-01-10 RX ORDER — LIDOCAINE HYDROCHLORIDE 10 MG/ML
INJECTION, SOLUTION EPIDURAL; INFILTRATION; INTRACAUDAL; PERINEURAL AS NEEDED
Status: DISCONTINUED | OUTPATIENT
Start: 2025-01-10 | End: 2025-01-10 | Stop reason: SURG

## 2025-01-10 RX ORDER — EPHEDRINE SULFATE 50 MG/ML
INJECTION, SOLUTION INTRAVENOUS AS NEEDED
Status: DISCONTINUED | OUTPATIENT
Start: 2025-01-10 | End: 2025-01-10 | Stop reason: SURG

## 2025-01-10 RX ORDER — SODIUM CHLORIDE, SODIUM LACTATE, POTASSIUM CHLORIDE, CALCIUM CHLORIDE 600; 310; 30; 20 MG/100ML; MG/100ML; MG/100ML; MG/100ML
INJECTION, SOLUTION INTRAVENOUS CONTINUOUS PRN
Status: DISCONTINUED | OUTPATIENT
Start: 2025-01-10 | End: 2025-01-10 | Stop reason: SURG

## 2025-01-10 RX ORDER — LOSARTAN POTASSIUM 50 MG/1
50 TABLET ORAL 2 TIMES DAILY
Status: DISCONTINUED | OUTPATIENT
Start: 2025-01-10 | End: 2025-01-13

## 2025-01-10 RX ORDER — PROCHLORPERAZINE EDISYLATE 5 MG/ML
5 INJECTION INTRAMUSCULAR; INTRAVENOUS EVERY 8 HOURS PRN
Status: DISCONTINUED | OUTPATIENT
Start: 2025-01-10 | End: 2025-01-10 | Stop reason: HOSPADM

## 2025-01-10 RX ORDER — ONDANSETRON 2 MG/ML
4 INJECTION INTRAMUSCULAR; INTRAVENOUS ONCE AS NEEDED
OUTPATIENT
Start: 2025-01-10 | End: 2025-01-10

## 2025-01-10 RX ORDER — SENNOSIDES 8.6 MG
17.2 TABLET ORAL NIGHTLY
Status: DISCONTINUED | OUTPATIENT
Start: 2025-01-10 | End: 2025-01-13

## 2025-01-10 RX ORDER — DOCUSATE SODIUM 100 MG/1
100 CAPSULE, LIQUID FILLED ORAL 2 TIMES DAILY
Status: DISCONTINUED | OUTPATIENT
Start: 2025-01-10 | End: 2025-01-13

## 2025-01-10 RX ORDER — SODIUM PHOSPHATE, DIBASIC AND SODIUM PHOSPHATE, MONOBASIC 7; 19 G/230ML; G/230ML
1 ENEMA RECTAL ONCE AS NEEDED
Status: DISCONTINUED | OUTPATIENT
Start: 2025-01-10 | End: 2025-01-13

## 2025-01-10 RX ORDER — ONDANSETRON 2 MG/ML
4 INJECTION INTRAMUSCULAR; INTRAVENOUS EVERY 6 HOURS PRN
Status: DISCONTINUED | OUTPATIENT
Start: 2025-01-10 | End: 2025-01-10 | Stop reason: HOSPADM

## 2025-01-10 RX ORDER — OXYCODONE HYDROCHLORIDE 5 MG/1
5 TABLET ORAL EVERY 4 HOURS PRN
Status: DISCONTINUED | OUTPATIENT
Start: 2025-01-10 | End: 2025-01-13

## 2025-01-10 RX ORDER — BUPIVACAINE HYDROCHLORIDE AND EPINEPHRINE 5; 5 MG/ML; UG/ML
INJECTION, SOLUTION PERINEURAL AS NEEDED
Status: DISCONTINUED | OUTPATIENT
Start: 2025-01-10 | End: 2025-01-10 | Stop reason: HOSPADM

## 2025-01-10 RX ORDER — ACETAMINOPHEN 500 MG
1000 TABLET ORAL ONCE
Status: COMPLETED | OUTPATIENT
Start: 2025-01-10 | End: 2025-01-10

## 2025-01-10 RX ORDER — MORPHINE SULFATE 10 MG/ML
6 INJECTION, SOLUTION INTRAMUSCULAR; INTRAVENOUS EVERY 10 MIN PRN
Status: DISCONTINUED | OUTPATIENT
Start: 2025-01-10 | End: 2025-01-10 | Stop reason: HOSPADM

## 2025-01-10 RX ORDER — ONDANSETRON 2 MG/ML
4 INJECTION INTRAMUSCULAR; INTRAVENOUS EVERY 6 HOURS PRN
Status: DISCONTINUED | OUTPATIENT
Start: 2025-01-10 | End: 2025-01-13

## 2025-01-10 RX ORDER — GLYCOPYRROLATE 0.2 MG/ML
INJECTION, SOLUTION INTRAMUSCULAR; INTRAVENOUS AS NEEDED
Status: DISCONTINUED | OUTPATIENT
Start: 2025-01-10 | End: 2025-01-10 | Stop reason: SURG

## 2025-01-10 RX ORDER — METOPROLOL TARTRATE 25 MG/1
25 TABLET, FILM COATED ORAL ONCE AS NEEDED
Status: COMPLETED | OUTPATIENT
Start: 2025-01-10 | End: 2025-01-10

## 2025-01-10 RX ORDER — DIAZEPAM 2 MG/1
5 TABLET ORAL EVERY 6 HOURS PRN
Status: DISCONTINUED | OUTPATIENT
Start: 2025-01-10 | End: 2025-01-13

## 2025-01-10 RX ORDER — HYDROMORPHONE HYDROCHLORIDE 1 MG/ML
0.8 INJECTION, SOLUTION INTRAMUSCULAR; INTRAVENOUS; SUBCUTANEOUS EVERY 2 HOUR PRN
Status: DISCONTINUED | OUTPATIENT
Start: 2025-01-10 | End: 2025-01-13

## 2025-01-10 RX ADMIN — SODIUM CHLORIDE, SODIUM LACTATE, POTASSIUM CHLORIDE, CALCIUM CHLORIDE: 600; 310; 30; 20 INJECTION, SOLUTION INTRAVENOUS at 12:05:00

## 2025-01-10 RX ADMIN — EPHEDRINE SULFATE 5 MG: 50 INJECTION, SOLUTION INTRAVENOUS at 12:17:00

## 2025-01-10 RX ADMIN — MIDAZOLAM HYDROCHLORIDE 2 MG: 1 INJECTION INTRAMUSCULAR; INTRAVENOUS at 11:31:00

## 2025-01-10 RX ADMIN — ROCURONIUM BROMIDE 5 MG: 10 INJECTION, SOLUTION INTRAVENOUS at 11:39:00

## 2025-01-10 RX ADMIN — GLYCOPYRROLATE 0.2 MG: 0.2 INJECTION, SOLUTION INTRAMUSCULAR; INTRAVENOUS at 11:39:00

## 2025-01-10 RX ADMIN — EPHEDRINE SULFATE 10 MG: 50 INJECTION, SOLUTION INTRAVENOUS at 11:45:00

## 2025-01-10 RX ADMIN — ONDANSETRON 8 MG: 2 INJECTION INTRAMUSCULAR; INTRAVENOUS at 13:51:00

## 2025-01-10 RX ADMIN — LIDOCAINE HYDROCHLORIDE 25 MG: 10 INJECTION, SOLUTION EPIDURAL; INFILTRATION; INTRACAUDAL; PERINEURAL at 11:38:00

## 2025-01-10 RX ADMIN — SODIUM CHLORIDE, SODIUM LACTATE, POTASSIUM CHLORIDE, CALCIUM CHLORIDE: 600; 310; 30; 20 INJECTION, SOLUTION INTRAVENOUS at 11:31:00

## 2025-01-10 RX ADMIN — CEFAZOLIN SODIUM 2 G: 1 INJECTION, POWDER, FOR SOLUTION INTRAMUSCULAR; INTRAVENOUS at 11:44:00

## 2025-01-10 NOTE — ANESTHESIA PROCEDURE NOTES
Airway  Date/Time: 1/10/2025 11:41 AM  Urgency: elective      General Information and Staff    Patient location during procedure: OR  Anesthesiologist: Kayli Gross MD  Resident/CRNA: Adrianna Hunt CRNA  Performed: CRNA   Performed by: Adrianna Hunt CRNA  Authorized by: Kayli Gross MD      Indications and Patient Condition  Indications for airway management: airway protection and anesthesia  Spontaneous ventilation: present  Sedation level: deep  Preoxygenated: yes  Patient position: sniffing  Mask difficulty assessment: 1 - vent by mask    Final Airway Details  Final airway type: endotracheal airway      Successful airway: ETT  Cuffed: yes   Successful intubation technique: Video laryngoscopy  Endotracheal tube insertion site: oral  Blade: Lukasz  Blade size: #3  ETT size (mm): 7.0    Cormack-Lehane Classification: grade I - full view of glottis  Placement verified by: capnometry   Measured from: lips  ETT to lips (cm): 21  Number of attempts at approach: 1  Number of other approaches attempted: 0

## 2025-01-10 NOTE — H&P
[facility]  part of [facility]    History & Physical    Tami Noel Patient Status:  Inpatient    2/15/1965 MRN [id number]   Location [facility] PRE OP RECOVERY Attending Bogdan Pena MD   Hosp Day # 0 PCP RADHA ANDRE     Date:  1/10/2025  Date of Admission:  1/10/2025    History:  Past Medical History:    Back problem    lumbar back pain    Hepatitis C    caused by blood transfusion- since resolved    High blood pressure    History of blood transfusion    ITP- no reaction    ITP (idiopathic thrombocytopenic purpura)    Migraine    Migraines    Osteopenia    Stroke (HCC)    TIA (transient ischemic attack)     Past Surgical History:   Procedure Laterality Date      2001    x1    Colonoscopy      ,     Egd scope  10/08,     Sinus surgery        Splenectomy      Us biopsy liver ([id number]=[id number])  2012     Family History   Problem Relation Age of Onset    Hypertension Father     Other (Other) Father         arthritis    Stroke Mother     Hypertension Mother     Breast Cancer Paternal Grandmother         77 for 2nd reoccurrance      reports that she has never smoked. She has never been exposed to tobacco smoke. She has never used smokeless tobacco. She reports that she does not currently use alcohol. She reports that she does not use drugs.    Allergies:  Allergies[1]    Home Medications:  Prescriptions Prior to Admission[2]    Review of Systems:  12 point ROS reviewed without pertinent positives.     HPI: The patient presents with complaints of lumbar radiculopathy. The pain radiates from the buttock/gluteal region to the bilateral lateral hips and right anterior thigh.  She reports very minimal pain in the back.  She had prior lumbar spine surgery in the form of a microdiscectomy in 2023.  They deny current fevers, chills, infection, rashes/bruises on the body, gross bowel/bladder incontinence or saddle anesthesia.     Physical Exam:  The  patient is well developed, no acute distress, alert and oriented x3, skin intact to the posterior lumbar without erythema or edema, prior scar well healed, motor exam with 4-/5 EHL on  the left, otherwise 5/5 bilateral lower extremities, calves soft and non tender, 2+DP      Assessment:  Patient Active Problem List   Diagnosis    Chronic hepatitis C without hepatic coma (HCC)    Cerebrovascular accident (CVA) (HCC)    Cerebrovascular accident (CVA), unspecified mechanism (HCC)    Internal carotid artery dissection (HCC)    Cervical polyp    Abdominal bloating with cramps    Pelvic pain    Right lumbar radiculitis    History of CVA (cerebrovascular accident)    Lumbar spinal stenosis    Primary hypertension     Lumbar spondylolisthesis  Lumbar stenosis  Lumbar extraforaminal L4-5 HNP    Plan:  L4-5 MIS TLIF      Mihaela Luu PA-C  1/10/2025  10:57 AM        [1]   Allergies  Allergen Reactions    Asa [Aspirin] OTHER (SEE COMMENTS)     Hx ITP    Sulfa Antibiotics HIVES     Other reaction(s): Rash   [2]   Medications Prior to Admission   Medication Sig Dispense Refill Last Dose/Taking    metoprolol succinate ER 50 MG Oral Tablet 24 Hr Take 1 tablet (50 mg total) by mouth daily.   1/8/2025    Acetaminophen ER (MIDOL) 650 MG Oral Tab CR Take 1 tablet (650 mg total) by mouth every 8 (eight) hours as needed for Pain.   1/8/2025    losartan 50 MG Oral Tab Take 1 tablet (50 mg total) by mouth 2 (two) times daily.   1/8/2025    Methylcellulose, Laxative, (CITRUCEL) 500 MG Oral Tab Take 6 tablets by mouth in the morning and 6 tablets before bedtime.   1/2/2025    amLODIPine 2.5 MG Oral Tab Take 1 tablet (2.5 mg total) by mouth nightly.   1/8/2025    Multiple Vitamins-Minerals (MULTI-VITAMIN/MINERALS) Oral Tab Take 1 tablet by mouth daily.   1/2/2025    CALCIUM 1200 OR Take by mouth 2 (two) times daily.   1/2/2025    aspirin 81 MG Oral Chew Tab Chew 1 tablet (81 mg total) by mouth daily. 30 tablet 1 1/2/2025

## 2025-01-10 NOTE — ANESTHESIA POSTPROCEDURE EVALUATION
Patient: Tami Noel    Procedure Summary       Date: 01/10/25 Room / Location: [facility] MAIN OR  / [facility] MAIN OR    Anesthesia Start: 1131 Anesthesia Stop: 1459    Procedure: L4-5 minimally invasive transforaminal lumbar interbody fusion (Spine Lumbar) Diagnosis: (Lumbar radiculopathy, lumbar stenosis, lumbar spondylolisthesis)    Surgeons: Bogdan Pena MD Anesthesiologist: Kayli Gross MD    Anesthesia Type: general ASA Status: 3            Anesthesia Type: general    Vitals Value Taken Time   /105 01/10/25 1458   Temp 97.7 °F (36.5 °C) 01/10/25 1458   Pulse 87 01/10/25 1458   Resp 15 01/10/25 1458   SpO2 98 % 01/10/25 1458   Vitals shown include unfiled device data.    [facility] AN Post Evaluation:   Patient Evaluated in PACU  Patient Participation: complete - patient participated  Level of Consciousness: awake and alert  Pain Score: 0  Pain Management: adequate  Airway Patency:patent  Yes    Nausea/Vomiting: none  Cardiovascular Status: acceptable  Respiratory Status: acceptable  Postoperative Hydration stable      Adrianna Hunt CRNA  1/10/2025 2:59 PM

## 2025-01-10 NOTE — ANESTHESIA PREPROCEDURE EVALUATION
Anesthesia PreOp Note    HPI:     Tami Noel is a 59 year old female who presents for preoperative consultation requested by: Bogdan Pena MD    Date of Surgery: 1/10/2025    Procedure(s):  L4-5 minimally invasive transforaminal lumbar interbody fusion  Indication: Lumbar radiculopathy, lumbar stenosis, lumbar spondylolisthesis    Relevant Problems   No relevant active problems       NPO:  Last Liquid Consumption Date: 25  Last Liquid Consumption Time: 1800  Last Solid Consumption Date: 25  Last Solid Consumption Time: 1800  Last Liquid Consumption Date: 25          History Review:  Patient Active Problem List    Diagnosis Date Noted    Lumbar spinal stenosis 2024    Primary hypertension 2024    Right lumbar radiculitis 2023    History of CVA (cerebrovascular accident) 2023    Abdominal bloating with cramps 2022    Pelvic pain 2022    Cervical polyp 2020    Cerebrovascular accident (CVA) (HCC) 2020    Cerebrovascular accident (CVA), unspecified mechanism (HCC) 2020    Internal carotid artery dissection (HCC) 2020    Chronic hepatitis C without hepatic coma (HCC) 2016       Past Medical History:    Back problem    lumbar back pain    Hepatitis C    caused by blood transfusion- since resolved    High blood pressure    History of blood transfusion    ITP- no reaction    ITP (idiopathic thrombocytopenic purpura)    Migraine    Migraines    Osteopenia    Stroke (HCC)    TIA (transient ischemic attack)       Past Surgical History:   Procedure Laterality Date      2001    x1    Colonoscopy      ,     Egd scope  10/08,     Sinus surgery        Splenectomy      Us biopsy liver (orp=[id number])  2012       Prescriptions Prior to Admission[1]  Current Medications and Prescriptions Ordered in Epic[2]    Allergies[3]    Family History   Problem Relation Age of Onset    Hypertension Father     Other (Other)  Father         arthritis    Stroke Mother     Hypertension Mother     Breast Cancer Paternal Grandmother         77 for 2nd reoccurrance     Social History     Socioeconomic History    Marital status:    Tobacco Use    Smoking status: Never     Passive exposure: Never    Smokeless tobacco: Never   Vaping Use    Vaping status: Never Used   Substance and Sexual Activity    Alcohol use: Not Currently     Alcohol/week: 0.0 standard drinks of alcohol     Comment: RARELY    Drug use: No    Sexual activity: Yes     Partners: Male   Other Topics Concern    Caffeine Concern No       Available pre-op labs reviewed.  Lab Results   Component Value Date    WBC 6.9 12/19/2024    RBC 4.76 12/19/2024    HGB 14.9 12/19/2024    HCT 43.8 12/19/2024    MCV 92.0 12/19/2024    MCH 31.3 12/19/2024    MCHC 34.0 12/19/2024    RDW 13.2 12/19/2024    .0 (L) 12/19/2024     Lab Results   Component Value Date     12/19/2024    K 4.1 12/19/2024     12/19/2024    CO2 31.0 12/19/2024    BUN 26 (H) 12/19/2024    CREATSERUM 1.06 (H) 12/19/2024    GLU 90 12/19/2024    CA 10.0 12/19/2024          Vital Signs:  Body mass index is 23.44 kg/m².   height is 1.524 m (5') and weight is 54.4 kg (120 lb). Her oral temperature is 98.6 °F (37 °C). Her blood pressure is 143/84 and her pulse is 69. Her respiration is 16 and oxygen saturation is 96%.   Vitals:    01/06/25 1024 01/10/25 0908   BP:  143/84   Pulse:  69   Resp:  16   Temp:  98.6 °F (37 °C)   TempSrc:  Oral   SpO2:  96%   Weight: 53.1 kg (117 lb) 54.4 kg (120 lb)   Height: 1.524 m (5')         Anesthesia Evaluation     Patient summary reviewed    Airway   Mallampati: II  TM distance: >3 FB  Neck ROM: full  Dental - Dentition appears grossly intact     Pulmonary     breath sounds clear to auscultation  (-) COPD, sleep apnea  Cardiovascular   (+) hypertension  (-) past MI, dysrhythmias    ECG reviewed  Rhythm: regular  Rate: normal    Neuro/Psych    (+)  neuromuscular disease,  TIA, CVA,        GI/Hepatic/Renal    (+) hepatitis C, liver disease    Endo/Other    (-) diabetes mellitus  Abdominal     Abdomen: soft.                 Anesthesia Plan:   ASA:  3  Plan:   General  Airway:  ETT  Post-op Pain Management: IV analgesics and Oral pain medication  Informed Consent Plan and Risks Discussed With:  Patient  Discussed plan with:  CRNA      I have informed Tami Noel and/or legal guardian or family member of the nature of the anesthetic plan, benefits, risks including possible dental damage if relevant, major complications, and any alternative forms of anesthetic management.   All of the patient's questions were answered to the best of my ability. The patient desires the anesthetic management as planned.  Adrianna Hunt CRNA  I have reviewed the above note agree with the plan.  Lungs: Clear   Heart: RHRR  Airway: Adequate   ASA III  NPO>Mn  Plan : GA.      Kyali Gross MD.  1/10/2025 11:31 AM  Present on Admission:  **None**           [1]   Medications Prior to Admission   Medication Sig Dispense Refill Last Dose/Taking    metoprolol succinate ER 50 MG Oral Tablet 24 Hr Take 1 tablet (50 mg total) by mouth daily.   1/8/2025    Acetaminophen ER (MIDOL) 650 MG Oral Tab CR Take 1 tablet (650 mg total) by mouth every 8 (eight) hours as needed for Pain.   1/8/2025    losartan 50 MG Oral Tab Take 1 tablet (50 mg total) by mouth 2 (two) times daily.   1/8/2025    Methylcellulose, Laxative, (CITRUCEL) 500 MG Oral Tab Take 6 tablets by mouth in the morning and 6 tablets before bedtime.   1/2/2025    amLODIPine 2.5 MG Oral Tab Take 1 tablet (2.5 mg total) by mouth nightly.   1/8/2025    Multiple Vitamins-Minerals (MULTI-VITAMIN/MINERALS) Oral Tab Take 1 tablet by mouth daily.   1/2/2025    CALCIUM 1200 OR Take by mouth 2 (two) times daily.   1/2/2025    aspirin 81 MG Oral Chew Tab Chew 1 tablet (81 mg total) by mouth daily. 30 tablet 1 1/2/2025   [2]   Current Facility-Administered Medications  Ordered in Epic   Medication Dose Route Frequency Provider Last Rate Last Admin    ceFAZolin (Ancef) 2g in 10mL IV syringe premix  2 g Intravenous Once Bogdan Pena MD        sodium chloride 0.9% infusion   Intravenous Continuous Bogdan Pena MD 83 mL/hr at 01/10/25 0950 New Bag at 01/10/25 0950     No current [facility]-ordered outpatient medications on file.   [3]   Allergies  Allergen Reactions    Asa [Aspirin] OTHER (SEE COMMENTS)     Hx ITP    Sulfa Antibiotics HIVES     Other reaction(s): Rash

## 2025-01-10 NOTE — CONSULTS
[facility]    PATIENT'S NAME: KIZZY BERMUDEZ   ATTENDING PHYSICIAN: Bogdan Pena MD   CONSULTING PHYSICIAN: Mary Kate Deleon MD   PATIENT ACCOUNT#:   [id number]    LOCATION:  [facility] PACU 13 [facility] 10  MEDICAL RECORD #:   [id number]       YOB: 1965  ADMISSION DATE:       01/10/2025      CONSULT DATE:  01/10/2025    REPORT OF CONSULTATION      REASON FOR ADMISSION:  Post lumbar interbody fusion.    HISTORY OF PRESENT ILLNESS:  Patient is a 59-year-old  female with chronic back pain and lumbar radiculopathy at level L4-L5 with disc protrusion with right more than left foraminal stenosis on imaging studies, failed outpatient conservative medical management options.  Scheduled today for above-mentioned procedure by her orthopedic surgeon, Dr. Pena.  Postoperatively, transferred to PACU for further monitoring.      PAST MEDICAL HISTORY:  Degenerative joint disease of lumbar spine; hypertension; idiopathic thrombocytopenic purpura, status post splenectomy; migraines; cerebrovascular accident.    PAST SURGICAL HISTORY:  As mentioned above.  Also, , sinus surgery, L4-L5 laminectomy and discectomy.      MEDICATIONS:  Please see medication reconciliation list.     ALLERGIES:  Sulfa.     SOCIAL HISTORY:  No tobacco or drug use.  Social alcohol.  Lives with her family.  Independent for basic activities of daily living.     FAMILY HISTORY:  Father had hypertension.  Mother had cerebrovascular accident.      REVIEW OF SYSTEMS:  Currently resting in bed.  Back discomfort.  No lower extremity tingling or numbness.  Other 12-point review of systems is negative.        PHYSICAL EXAMINATION:    GENERAL:  Alert and oriented to time, place and person.  Mild to moderate distress.   VITAL SIGNS:  Temperature 97.7, pulse 72, respiratory rate 18, blood pressure 162/109, pulse ox 99% on room air.  HEENT:  Atraumatic.  Oropharynx clear.  Moist mucous membranes.  Ears and nose normal.  Eyes:   Anicteric sclerae.   NECK:  Supple.  No lymphadenopathy.  Trachea midline.  Full range of motion.   LUNGS:  Clear to auscultation bilaterally.  Normal respiratory effort.    HEART:  Regular rate and rhythm.  S1 and S2 auscultated.  No murmur.    ABDOMEN:  Soft, nondistended.  No tenderness.  Positive bowel sounds.   EXTREMITIES:  No peripheral edema, clubbing or cyanosis.   NEUROLOGIC:  Lumbar area dressing.  Motor and sensory intact.    ASSESSMENT AND PLAN:    1.   Lumbar radiculopathy, status post L4-L5 minimally invasive transforaminal lumbar interbody fusion.  Pain control.  Neuro checks.  DVT prophylaxis.  Physical and occupational therapy.    2.   Essential hypertension.  Continue home medications and monitor.    Dictated By Mary Kate Deleon MD  d: 01/10/2025 15:18:08  t: 01/10/2025 16:01:54  Job [id number]  FB/    cc: Bogdan Pena MD

## 2025-01-11 ENCOUNTER — APPOINTMENT (OUTPATIENT)
Dept: GENERAL RADIOLOGY | Facility: HOSPITAL | Age: 60
End: 2025-01-11
Attending: PHYSICIAN ASSISTANT
Payer: COMMERCIAL

## 2025-01-11 LAB
DEPRECATED RDW RBC AUTO: 42.7 FL (ref 35.1–46.3)
ERYTHROCYTE [DISTWIDTH] IN BLOOD BY AUTOMATED COUNT: 13 % (ref 11–15)
HCT VFR BLD AUTO: 35.7 %
HGB BLD-MCNC: 12.2 G/DL
MCH RBC QN AUTO: 31.2 PG (ref 26–34)
MCHC RBC AUTO-ENTMCNC: 34.2 G/DL (ref 31–37)
MCV RBC AUTO: 91.3 FL
PLATELET # BLD AUTO: 175 10(3)UL (ref 150–450)
RBC # BLD AUTO: 3.91 X10(6)UL
WBC # BLD AUTO: 14.2 X10(3) UL (ref 4–11)

## 2025-01-11 PROCEDURE — 72100 X-RAY EXAM L-S SPINE 2/3 VWS: CPT | Performed by: PHYSICIAN ASSISTANT

## 2025-01-11 PROCEDURE — 99233 SBSQ HOSP IP/OBS HIGH 50: CPT | Performed by: INTERNAL MEDICINE

## 2025-01-11 NOTE — PLAN OF CARE
From home with spouse. POD 0. Alert. RA. IVF infusing. Teds and SCDs. Voiding up to bathroom, x1 with RW. Plan for pain control, PT/OT, a home anticipating NN upon discharge once cleared by all services.     Problem: Patient Centered Care  Goal: Patient preferences are identified and integrated in the patient's plan of care  Description: Interventions:  - What would you like us to know as we care for you?   - Provide timely, complete, and accurate information to patient/family  - Incorporate patient and family knowledge, values, beliefs, and cultural backgrounds into the planning and delivery of care  - Encourage patient/family to participate in care and decision-making at the level they choose  - Honor patient and family perspectives and choices  Outcome: Progressing     Problem: Patient/Family Goals  Goal: Patient/Family Long Term Goal  Description: Patient's Long Term Goal:     Interventions:  -   - See additional Care Plan goals for specific interventions  Outcome: Progressing  Goal: Patient/Family Short Term Goal  Description: Patient's Short Term Goal:     Interventions:   -   - See additional Care Plan goals for specific interventions  Outcome: Progressing

## 2025-01-11 NOTE — PROGRESS NOTES
Spine Surgery Progress Note     Interval history     Afebrile vital signs stable         Objective         Vitals:    01/11/25 0744   BP: (!) 116/106   Pulse: 98   Resp: 16   Temp: 98 °F (36.7 °C)       Examination       IP Q TA EHL GS  R    5 5 5 5 5    L    5 5 5 5 5    Sensation intact to light touch LE dermatomes bilateral        Assessment  POD 1    Plan   -DVT ppx with SCD, stockings, and ambulation  -continue PT/OT eval and treatment  -postoperative radiograph  -regular diet   -pain control  -progress toward discharge - plan for today     Oli Thomas MD

## 2025-01-11 NOTE — DISCHARGE INSTRUCTIONS
Discharge Instructions Dr. Bogdan Pena  POSTERIOR LUMBAR INTERBODY FUSION (TLIF),    Wound Care  Do not change or remove your initial postoperative dressing for the first 2 days after surgery   unless instructed to do so by Dr. Pena's staff or if it is saturated by drainage. If removed,   please reapply a clean dry dressing over the incision. You may have thin adhesive paper strips   on your incision after surgery--please do NOT remove them. They will be removed at your 2-  week postoperative appointment. You may remove your dressing and shower on the third day   after surgery. Let warm soapy water run over the incisional area. Do not scrub the area.   Gently pat the incisional area completely dry. You may reapply a clean dry dressing over the   incision to prevent irritation from clothing, but this is not required after post op day 3. You are   not allowed to soak your incision in a bathtub, hot tub, or swimming pool until the incision is   completely healed and Dr. Pena or his staff permit these activities. Do not apply antibiotic   gels, ointments (Neosporin or bacitracin), lotions, peroxide or iodine solutions on or around the   Incision.  You may have some swelling and/or clear yellow drainage around your incisional site. This is   normal and may take several weeks to go away. Please leave any superficial scabs alone and let   them fall off on their own.    Immediate Follow Up  If you notice incisional redness or increased swelling, continuous clear drainage or change in   color to the drainage, malodor, significant leg swelling, increased pain and/or a fever greater   than 101.5, you should call our office. If it is after business hours you should present to the   closest emergency room. If you have shortness of breath, chest pain, significant stomach pain   and bloating without a bowel movement, complete loss of bowel or bladder function please   contact our office and present to your local emergency room  as soon as possible.    Post-Operative Instructions - Lumbar  After surgery you may experience pain in or around the region of the incision. Some buttock   and leg pain as well as tingling or numbness may also be present. Initially it may be of greater   intensity than before surgery but will usually subside over time as the healing process occurs.   This discomfort is caused from surgical retraction of tissue as well as inflammation and swelling   of previously compressed nerves.  Early activity after surgery is extremely important to help prevent the complications such as   pneumonia and blood clots. Activity also promotes recovery, relieves muscle stiffness, allows   for development of a well-organized scar, and improves your outcome. Your recovery is an   essential part of the surgical process so please follow the guidelines below to return to your   desired activities as safely as possible.     Week 1  ? Try to get at least 8 hours of sleep each night. A disrupted sleep pattern is common   after discharge from the hospital and will return to normal over time.  ? Avoid bending and twisting at the waist. No bending more than what is required to get   dressed. No twisting more than required to toilet (wipe yourself).  ? No sitting for more than 1 hour at a time. Walk and/or change position before returning   to a sitting position.   ? You may not drive, but you may be driven.  ? Begin a daily walking program with 1-2 blocks initially; schedule a daily time and   increase distance daily. Ideally, we would like you to be up and walking 15   minutes/hour.   ? Eat a balanced high-fiber diet and drink plenty of water.  ? Take medications as prescribed, using narcotics and muscle relaxants only as needed.  ? Do not restart any NSAIDs such as Advil, Motrin, Aleve, ibuprofen, naproxen, diclofenac,   meloxicam or celecoxib for at least 3 months after your fusion surgery.   ? Take stool softeners to help with bowel  movements.    Week 2  ? Resume normal rising and retiring schedule but continue to rest throughout the day as   needed.  ? You may not drive, but you may be driven.  ? No lifting of anything weighing more than 10 to 15 pounds.  ? Continue scheduled walking, increasing distance and frequency as tolerated.  ? May resume sexual relations when comfortable between 2 to 4 weeks after surgery.  Please be advised the patient must be in the top position until 6 weeks after surgery.   ? Begin weaning from narcotic pain medications if you have not already.  ? Follow-up in the office as scheduled for further instructions.     Disability  The usual period of recovery for Lumbar Spinal Fusion surgery is 8 to 12 weeks and complete   healing may take from 6 to 9 months. Some patients may return to work sooner than others   depending on their job, response to surgery, and ability to perform other lighter tasks in the   workplace. Physician approval is required prior to returning to work.    Post-Operative Pain Medication Policy  It is Dr. Pena's aim to make you as comfortable as possible after surgery. We realize pain   management is not perfect, and that you will have some discomfort after your operation.   There are several factors that limit our ability to completely eliminate pain after surgery. This   first is that pain medications have side effects. Please be advised that high doses or continued   use of narcotic medications may put you at risk for serious health issues including but not   limited to respiratory depression (decreased ability to breathe normally), hypotension (low   blood pressure), nausea and constipation, generalized itching, urinary retention (inability to   urinate) and abdominal distention.   Dr. Pena will prescribe pain medication for 2-3 weeks after surgery and may refer you out to a   pain management specialist for any narcotic medication requested after this period of time.    Preventing Opioid  [facility] is committed to protecting our patients from Opioid addiction.   We only prescribe opioids when necessary. Whenever possible, we use less-addictive pain   medication and alternative pain management options. Both high-dosage opioids and lowdosage opioids taken over long periods of time can increase the risk of addiction. We attempt to limit our prescriptions of opioids after surgery as much as possible, which may include lower   dosages of opioid medications or fewer pills. If you have additional questions about Opioids   and their dependency, please contact our clinic.    Contact Information  Please contact Dr. Jean Maria’s , at [phone] with any   questions or concerns pertaining to scheduling or other administrative issues.  Please contact Mihaela Luu PA-C, Dr. Pena’s physician assistant, at [phone] with   any medical questions or concerns.     AFTER HOURS EMERGENCY CONTACT: Please dial [phone] and press “0”  for the  to connect you to the orthopaedic fellow or resident on call if you have any   urgent questions or concerns after regular business hours. If you do not hear back from the oncall physician in a timely matter and your urgent matter turns emergent, you should present to   your local emergency room. Please follow-up with Mihaela Luu PA-C the following business   day with an update if you do have to contact the on-call physician or present to your local   emergency room after surgery.     Future Dental Appointments  (3 Months following surgery): Prior to going to the dentist, please notify Dr. Pena and let your   Dentist know that you had a spinal fusion surgery. Have the treating physician prescribe   antibiotics prior to the procedure. The appropriate medications and guidelines are as follows:  ? Amoxicillin 2 grams 1 hour prior to procedure. If allergic to Penicillin, Clindamycin 600   mg, 1 hour prior to  procedure. No second doses are required following the dental   procedure

## 2025-01-11 NOTE — OCCUPATIONAL THERAPY NOTE
OCCUPATIONAL THERAPY EVALUATION - INPATIENT     Room Number: 437/437-A  Evaluation Date: 1/11/2025  Type of Evaluation: Initial  Presenting Problem: L4-5 fusion    Physician Order: IP Consult to Occupational Therapy  Reason for Therapy: ADL/IADL Dysfunction and Discharge Planning    OCCUPATIONAL THERAPY ASSESSMENT   Patient is a 59 year old female admitted 1/10/2025.  Prior to admission, patient's baseline is independent.  Patient is currently functioning near baseline with ADLs.    PLAN  Patient has been evaluated and presents with no skilled Occupational Therapy needs  at this time.  Patient will be discharged from Occupational Therapy services. Please re-order if a new functional limitation presents during this admission.    OT Device Recommendations: Reacher    OCCUPATIONAL THERAPY MEDICAL/SOCIAL HISTORY   Problem List  Active Problems:    Lumbar radiculopathy    Primary hypertension    S/P lumbar fusion    HOME SITUATION  Type of Home: House  Home Layout: One level  Lives With: Spouse  Toilet and Equipment: Standard height toilet  Shower/Tub and Equipment: Walk-in shower  Drives: Yes    Prior Level of [place]: independent    SUBJECTIVE  \"I think I can do it if my pain was better\"    OCCUPATIONAL THERAPY EXAMINATION    OBJECTIVE  Precautions: Spine  Fall Risk: Standard fall risk    WEIGHT BEARING RESTRICTION     PAIN ASSESSMENT  Rating: 10  Location: back  Management Techniques: Repositioning; Relaxation; Nurse notified        COGNITION  Overall Cognitive Status:  WFL - within functional limits    VISION  WFL      Communication: WFL    Behavioral/Emotional/Social: WFL    RANGE OF MOTION   Upper extremity ROM is within functional limits     STRENGTH ASSESSMENT  Upper extremity strength is within functional limits during ADLs, not tested with back prec    COORDINATION  Gross Motor: WFL   Fine Motor: WFL     ACTIVITIES OF DAILY LIVING ASSESSMENT  AM-PAC ‘6-Clicks’ Inpatient Daily Activity Short Form  How much  help from another person does the patient currently need…  -   Putting on and taking off regular lower body clothing?: A Lot  -   Bathing (including washing, rinsing, drying)?: A Little  -   Toileting, which includes using toilet, bedpan or urinal? : None  -   Putting on and taking off regular upper body clothing?: None  -   Taking care of personal grooming such as brushing teeth?: None  -   Eating meals?: None    AM-PAC Score:  Score: 21  Approx Degree of Impairment: 32.79%  Standardized Score (AM-PAC Scale): 44.27  CMS Modifier (G-Code): CJ    FUNCTIONAL TRANSFER ASSESSMENT  Sit to Stand: Edge of Bed; Chair  Edge of Bed: Supervision  Chair: Supervision  Toilet Transfer: Modified Independent    BED MOBILITY  Supine to Sit : Supervision  Sit to Supine (OT): Supervision    BALANCE ASSESSMENT  Static Sitting: Modified Independent  Static Standing: Supervision    FUNCTIONAL ADL ASSESSMENT  Eating: Independent  Grooming Standing: Supervision  Toileting Seated: Modified Independent    Skilled Therapy Provided: RN approved session. Received patient in supine. Performed ADLs/functional mobility/transfers as stated above. Primarily limited by pain/back prec.  At the end of session, patient left in chair with needs met, and RN aware of session.    EDUCATION PROVIDED  Patient Education : Role of Occupational Therapy; Plan of Care; Discharge Recommendations; Functional Transfer Techniques; Fall Prevention; Surgical Precautions; Proper Body Mechanics; Posture/Positioning  Patient's Response to Education: Verbalized Understanding; Returned Demonstration  Family/Caregiver Education : -- (same as pt)    The patient's Approx Degree of Impairment: 32.79% has been calculated based on documentation in the [facility] '6 clicks' Inpatient Daily Activity Short Form.  Research supports that patients with this level of impairment may benefit from HHOT however pt likely to improve with ADLs at dc to only requiring intermittent assist as needed  once pain improves.  Final disposition will be made by interdisciplinary medical team.    Patient End of Session: Up in chair;Needs met;Call light within reach;RN aware of session/findings;All patient questions and concerns addressed;Family present    Patient was able to achieve the following ...   Patient able to toilet transfer  safely and SPV-independently    Patient able to dress lower extremities  safely and assist as needed    Patient/Caregiver able to demonstrate safety with ADLS  safely and SPV-independently     Patient Evaluation Complexity Level:   Occupational Profile/Medical History LOW - Brief history including review of medical or therapy records    Specific performance deficits impacting engagement in ADL/IADL LOW  1 - 3 performance deficits    Client Assessment/Performance Deficits LOW - No comorbidities nor modifications of tasks    Clinical Decision Making LOW - Analysis of occupational profile, problem-focused assessments, limited treatment options    Overall Complexity LOW     OT Session Time: 23 minutes  Self-Care Home Management: 13 minutes  10 min estelle Javier OTR/L

## 2025-01-11 NOTE — PHYSICAL THERAPY NOTE
PHYSICAL THERAPY EVALUATION - INPATIENT     Room Number: 437/437-A  Evaluation Date: 1/11/2025  Type of Evaluation: Initial   Physician Order: PT Eval and Treat    Presenting Problem: Lumbar fusion     Reason for Therapy: Mobility Dysfunction and Discharge Planning    PHYSICAL THERAPY ASSESSMENT   Patient is a 59 year old female admitted 1/10/2025 for lumbar fusion w/ Hx radic.  Prior to admission, patient's baseline is indep.  Patient is currently functioning near baseline with bed mobility, transfers, and gait.  Patient is requiring supervision and contact guard assist as a result of the following impairments: decreased functional strength, pain, and medical status.  Physical Therapy will continue to follow for duration of hospitalization.    Patient will benefit from continued skilled PT Services with no additional skilled services but increased support at home.    PLAN DURING HOSPITALIZATION  Nursing Mobility Recommendation : 1 Assist     PT Treatment Plan: Patient education;Body mechanics;Range of motion  Rehab Potential : Good  Frequency (Obs): Daily     PHYSICAL THERAPY MEDICAL/SOCIAL HISTORY   History related to current admission: Elective Sx for LBP, LS radic     Problem List  Active Problems:    Lumbar radiculopathy    Primary hypertension    S/P lumbar fusion      HOME SITUATION  Type of Home: House  Home Layout: One level  Stairs to Enter : 2                  Lives With: Spouse    Drives: Yes         Prior Level of [place]: lives with spouse in [place] step ranch. Indep with all ADLs    SUBJECTIVE  My back hurts    PHYSICAL THERAPY EXAMINATION   OBJECTIVE  Precautions: Spine  Fall Risk: Standard fall risk    WEIGHT BEARING RESTRICTION       PAIN ASSESSMENT  Rating: 10  Location: back  Management Techniques: Activity promotion    COGNITION  Overall Cognitive Status:  WFL - within functional limits    RANGE OF MOTION AND STRENGTH ASSESSMENT  Upper extremity ROM and strength are within functional limits    Lower extremity ROM is within functional limits   Lower extremity strength is within functional limits except for the following:  BLE    BALANCE  Static Sitting: Good  Dynamic Sitting: Good  Static Standing: Fair  Dynamic Standing: Fair -    ADDITIONAL TESTS                                    NEUROLOGICAL FINDINGS                      ACTIVITY TOLERANCE                         O2 WALK       AM-PAC '6-Clicks' INPATIENT SHORT FORM - BASIC MOBILITY  How much difficulty does the patient currently have...  Patient Difficulty: Turning over in bed (including adjusting bedclothes, sheets and blankets)?: A Little   Patient Difficulty: Sitting down on and standing up from a chair with arms (e.g., wheelchair, bedside commode, etc.): A Little   Patient Difficulty: Moving from lying on back to sitting on the side of the bed?: A Little   How much help from another person does the patient currently need...   Help from Another: Moving to and from a bed to a chair (including a wheelchair)?: A Little   Help from Another: Need to walk in hospital room?: A Little   Help from Another: Climbing 3-5 steps with a railing?: A Little     AM-PAC Score:  Raw Score: 18   Approx Degree of Impairment: 46.58%   Standardized Score (AM-PAC Scale): 43.63   CMS Modifier (G-Code): CK    FUNCTIONAL ABILITY STATUS  Functional Mobility/Gait Assessment  Gait Assistance: Supervision  Distance (ft): 2x200  Assistive Device: None  Pattern: Shuffle  Stairs: Stairs  How Many Stairs: 4  Device: 2 Rails  Assist: Supervision  Rolling: supervision  Supine to Sit: supervision  Sit to Supine: supervision  Sit to Stand: supervision    Exercise/Education Provided:  Bed mobility  Body mechanics  Energy conservation  Gait training  Posture  Transfer training    Skilled Therapy Provided: Chart reviewed. RN aware. Patient up in bed.  Reviewed BLT prec's.  Instructed in log rolling to R.  All mobility with supervision. Walked 2x200 ft with sup and no A.D. to gym.   Performed 4 steps with step to pattern and 2 rails with CGA to sup.  Returned to chair in room.  Patient unable to tolerance chair and returned to bed with log rolling.  All needs left within reach.  Discussed pain with RN and home safety and healing times with patient and spouse.      The patient's Approx Degree of Impairment: 46.58% has been calculated based on documentation in the [facility] '6 clicks' Inpatient Basic Mobility Short Form.  Research supports that patients with this level of impairment may benefit from DC to home until ready for OP in 6-8 weeks.  Final disposition will be made by interdisciplinary medical team.    Patient End of Session: In bed;Needs met;Call light within reach;With  staff;RN aware of session/findings;All patient questions and concerns addressed;Hospital anti-slip socks;Ice applied    CURRENT GOALS  Goals to be met by: 1/15/2025  Patient Goal Patient's self-stated goal is: to go home   Goal #1 Patient is able to demonstrate supine - sit EOB @ level: independent     Goal #1   Current Status    Goal #2 Patient is able to demonstrate transfers EOB to/from [facility] at assistance level: independent with none     Goal #2  Current Status    Goal #3 Patient is able to ambulate  500 feet with assist device: none at assistance level: independent   Goal #3   Current Status    Goal #4 Patient will negotiate 2 stairs/one curb w/ assistive device and supervision   Goal #4   Current Status    Goal #5 Patient to demonstrate independence with home activity/exercise instructions provided to patient in preparation for discharge.   Goal #5   Current Status    Goal #6    Goal #6  Current Status      Patient Evaluation Complexity Level:  History Moderate - 1 or 2 personal factors and/or co-morbidities   Examination of body systems Moderate - addressing a total of 3 or more elements   Clinical Presentation  Moderate - Evolving   Clinical Decision Making  Moderate Complexity     Gait Trainin  minutes  Therapeutic Activity:  25 minutes  Neuromuscular Re-education: 0 minutes  Therapeutic Exercise: 0 minutes  Canalith Repositionin minutes  Manual Therapy: 0 minutes  Can add/delete any of these

## 2025-01-11 NOTE — PROGRESS NOTES
[facility]  part of [facility]     Hospitalist Progress Note     Tami Noel Patient Status:  Inpatient    2/15/1965 MRN [id number]   Location [facility] 4W/SW/SE Attending Bogdan Pena MD   Hosp Day # 1 PCP RADHA ANDRE     Subjective:     Patient laying in bed, appears comfortable at this time but reports intolerable pain post-op.  She was cleared for discharge by therapy but would prefer to remain in the hospital another day for further care.       Objective:    Review of Systems:   ROS completed; pertinent positive and negatives stated in subjective.      Vital signs:  Temp:  [97.4 °F (36.3 °C)-98.1 °F (36.7 °C)] 98 °F (36.7 °C)  Pulse:  [53-98] 98  Resp:  [10-25] 16  BP: (116-186)/() 116/106  SpO2:  [93 %-99 %] 93 %      Physical Exam:    Gen: NAD AO x3  Chest: good air entry CTABL  CVS: normal s1 and s2 RR  Abd: NABS soft NT ND  Neuro: CN 2-12 grossly intact  Ext: no edema in bilateral LE      Diagnostic Data:    Labs:  Recent Labs   Lab 25  0833   WBC 14.2*   HGB 12.2   MCV 91.3   .0       No results for input(s): \"GLU\", \"BUN\", \"CREATSERUM\", \"GFRAA\", \"GFRNAA\", \"CA\", \"ALB\", \"NA\", \"K\", \"CL\", \"CO2\", \"ALKPHO\", \"AST\", \"ALT\", \"BILT\", \"TP\" in the last 168 hours.    CrCl cannot be calculated (Patient's most recent lab result is older than the maximum 7 days allowed.).    No results for input(s): \"PTP\", \"INR\" in the last 168 hours.           Imaging: Imaging data reviewed in Epic.    Medications:    sennosides  17.2 mg Oral Nightly    docusate sodium  100 mg Oral BID    amLODIPine  2.5 mg Oral Nightly    losartan  50 mg Oral BID    metoprolol succinate ER  50 mg Oral Daily       Assessment & Plan:     Lumbar radiculopathy s/p L4/5 lumbar interbody fusion  Tolerated surgery well  IVF, abx, dvt ppx and pain meds per surgery team  PT/OT  HTN  BP elevated  Continue home meds - amlodipine, metoprolol, losartan  Monitor vitals      Plan of care discussed with  patient or family at bedside.      Supplementary Documentation:     Quality:  DVT Prophylaxis: SCDs  CODE status: Full      Estimated date of discharge: TBD  Discharge is dependent on: clinical stability  At this point Ms. Noel is expected to be discharge to: home                   Sergio Hendricks MD  Hospitalist    MDM: High, I personally reviewed the available laboratories, imaging. I discussed the case with RN. I ordered laboratories, studies including AM labs.  Medical decision making high, risk is high.       The 21st Century Cures Act makes medical notes like these available to patients in the interest of transparency. Please be advised this is a medical document. Medical documents are intended to carry relevant information, facts as evident, and the clinical opinion of the practitioner. The medical note is intended as peer to peer communication and may appear blunt or direct. It is written in medical language and may contain abbreviations or verbiage that are unfamiliar.

## 2025-01-11 NOTE — PLAN OF CARE
Patient has safety precautions in place bed in the lowest position, bed alarm on, and call light within reach. Plan of care ongoing. No further concerns as of present.    Problem: Patient Centered Care  Goal: Patient preferences are identified and integrated in the patient's plan of care  Description: Interventions:  - Provide timely, complete, and accurate information to patient/family  - Incorporate patient and family knowledge, values, beliefs, and cultural backgrounds into the planning and delivery of care  - Encourage patient/family to participate in care and decision-making at the level they choose  - Honor patient and family perspectives and choices  Outcome: Progressing     Problem: PAIN - ADULT  Goal: Verbalizes/displays adequate comfort level or patient's stated pain goal  Description: INTERVENTIONS:  - Encourage pt to monitor pain and request assistance  - Assess pain using appropriate pain scale  - Administer analgesics based on type and severity of pain and evaluate response  - Implement non-pharmacological measures as appropriate and evaluate response  - Consider cultural and social influences on pain and pain management  - Manage/alleviate anxiety  - Utilize distraction and/or relaxation techniques  - Monitor for opioid side effects  - Notify MD/LIP if interventions unsuccessful or patient reports new pain  - Anticipate increased pain with activity and pre-medicate as appropriate  Outcome: Progressing     Problem: RISK FOR INFECTION - ADULT  Goal: Absence of fever/infection during anticipated neutropenic period  Description: INTERVENTIONS  - Monitor WBC  - Administer growth factors as ordered  - Implement neutropenic guidelines  Outcome: Progressing     Problem: SAFETY ADULT - FALL  Goal: Free from fall injury  Description: INTERVENTIONS:  - Assess pt frequently for physical needs  - Identify cognitive and physical deficits and behaviors that affect risk of falls.  - [place] fall precautions as  indicated by assessment.  - Educate pt/family on patient safety including physical limitations  - Instruct pt to call for assistance with activity based on assessment  - Modify environment to reduce risk of injury  - Provide assistive devices as appropriate  - Consider OT/PT consult to assist with strengthening/mobility  - Encourage toileting schedule  Outcome: Progressing     Problem: DISCHARGE PLANNING  Goal: Discharge to home or other facility with appropriate resources  Description: INTERVENTIONS:  - Identify barriers to discharge w/pt and caregiver  - Include patient/family/discharge partner in discharge planning  - Arrange for needed discharge resources and transportation as appropriate  - Identify discharge learning needs (meds, wound care, etc)  - Arrange for interpreters to assist at discharge as needed  - Consider post-discharge preferences of patient/family/discharge partner  - Complete POLST form as appropriate  - Assess patient's ability to be responsible for managing their own health  - Refer to Case Management Department for coordinating discharge planning if the patient needs post-hospital services based on physician/LIP order or complex needs related to functional status, cognitive ability or social support system  Outcome: Progressing

## 2025-01-12 LAB
ALBUMIN SERPL-MCNC: 4 G/DL (ref 3.2–4.8)
ALBUMIN/GLOB SERPL: 1.5 {RATIO} (ref 1–2)
ALP LIVER SERPL-CCNC: 56 U/L
ALT SERPL-CCNC: 11 U/L
ANION GAP SERPL CALC-SCNC: 8 MMOL/L (ref 0–18)
AST SERPL-CCNC: 40 U/L (ref ?–34)
BASOPHILS # BLD AUTO: 0.13 X10(3) UL (ref 0–0.2)
BASOPHILS NFR BLD AUTO: 0.8 %
BILIRUB SERPL-MCNC: 0.7 MG/DL (ref 0.2–1.1)
BUN BLD-MCNC: 17 MG/DL (ref 9–23)
BUN/CREAT SERPL: 23.3 (ref 10–20)
CALCIUM BLD-MCNC: 8.9 MG/DL (ref 8.7–10.4)
CHLORIDE SERPL-SCNC: 103 MMOL/L (ref 98–112)
CO2 SERPL-SCNC: 28 MMOL/L (ref 21–32)
CREAT BLD-MCNC: 0.73 MG/DL
DEPRECATED RDW RBC AUTO: 44.2 FL (ref 35.1–46.3)
EGFRCR SERPLBLD CKD-EPI 2021: 94 ML/MIN/1.73M2 (ref 60–?)
EOSINOPHIL # BLD AUTO: 0.01 X10(3) UL (ref 0–0.7)
EOSINOPHIL NFR BLD AUTO: 0.1 %
ERYTHROCYTE [DISTWIDTH] IN BLOOD BY AUTOMATED COUNT: 13.2 % (ref 11–15)
GLOBULIN PLAS-MCNC: 2.7 G/DL (ref 2–3.5)
GLUCOSE BLD-MCNC: 108 MG/DL (ref 70–99)
HCT VFR BLD AUTO: 36 %
HGB BLD-MCNC: 12.8 G/DL
IMM GRANULOCYTES # BLD AUTO: 0.1 X10(3) UL (ref 0–1)
IMM GRANULOCYTES NFR BLD: 0.6 %
LYMPHOCYTES # BLD AUTO: 2.34 X10(3) UL (ref 1–4)
LYMPHOCYTES NFR BLD AUTO: 14.1 %
MCH RBC QN AUTO: 32.7 PG (ref 26–34)
MCHC RBC AUTO-ENTMCNC: 35.6 G/DL (ref 31–37)
MCV RBC AUTO: 91.8 FL
MONOCYTES # BLD AUTO: 1.96 X10(3) UL (ref 0.1–1)
MONOCYTES NFR BLD AUTO: 11.8 %
NEUTROPHILS # BLD AUTO: 12.01 X10 (3) UL (ref 1.5–7.7)
NEUTROPHILS # BLD AUTO: 12.01 X10(3) UL (ref 1.5–7.7)
NEUTROPHILS NFR BLD AUTO: 72.6 %
OSMOLALITY SERPL CALC.SUM OF ELEC: 290 MOSM/KG (ref 275–295)
PLATELET # BLD AUTO: 191 10(3)UL (ref 150–450)
POTASSIUM SERPL-SCNC: 3.6 MMOL/L (ref 3.5–5.1)
PROT SERPL-MCNC: 6.7 G/DL (ref 5.7–8.2)
RBC # BLD AUTO: 3.92 X10(6)UL
SODIUM SERPL-SCNC: 139 MMOL/L (ref 136–145)
WBC # BLD AUTO: 16.6 X10(3) UL (ref 4–11)

## 2025-01-12 PROCEDURE — 99233 SBSQ HOSP IP/OBS HIGH 50: CPT | Performed by: INTERNAL MEDICINE

## 2025-01-12 RX ORDER — DEXAMETHASONE 2 MG/1
2 TABLET ORAL ONCE
Status: COMPLETED | OUTPATIENT
Start: 2025-01-12 | End: 2025-01-12

## 2025-01-12 NOTE — PROGRESS NOTES
[facility]  part of [facility]     Hospitalist Progress Note     Tami Noel Patient Status:  Inpatient    2/15/1965 MRN [id number]   Location [facility] 4W/SW/SE Attending Bogdan Pena MD   Hosp Day # 2 PCP RADHA ANDRE     Subjective:     Sitting up in bed, appears comfortable but anxious about going home on her current pain regimen.   Plan to discharge in a.m. if the pain is well controlled.       Objective:    Review of Systems:   ROS completed; pertinent positive and negatives stated in subjective.      Vital signs:  Temp:  [98.2 °F (36.8 °C)-99.8 °F (37.7 °C)] 99.8 °F (37.7 °C)  Pulse:  [78-87] 87  Resp:  [18] 18  BP: (129-156)/(68-80) 129/68  SpO2:  [93 %-97 %] 93 %      Physical Exam:    Gen: NAD AO x3  Chest: good air entry CTABL  CVS: normal s1 and s2 RR  Abd: NABS soft NT ND  Neuro: CN 2-12 grossly intact  Ext: no edema in bilateral LE      Diagnostic Data:    Labs:  Recent Labs   Lab 25  0833 25  0650   WBC 14.2* 16.6*   HGB 12.2 12.8   MCV 91.3 91.8   .0 191.0       Recent Labs   Lab 25  0650   *   BUN 17   CREATSERUM 0.73   CA 8.9   ALB 4.0      K 3.6      CO2 28.0   ALKPHO 56   AST 40*   ALT 11   BILT 0.7   TP 6.7       Estimated Creatinine Clearance: 58.1 mL/min (based on SCr of 0.73 mg/dL).    No results for input(s): \"PTP\", \"INR\" in the last 168 hours.           Imaging: Imaging data reviewed in Epic.    Medications:    sennosides  17.2 mg Oral Nightly    docusate sodium  100 mg Oral BID    amLODIPine  2.5 mg Oral Nightly    losartan  50 mg Oral BID    metoprolol succinate ER  50 mg Oral Daily       Assessment & Plan:     Lumbar radiculopathy s/p L4/5 lumbar interbody fusion  Tolerated surgery well  IVF, abx, dvt ppx and pain meds per surgery team  PT/OT  Discharge planning  HTN  BP elevated  Continue home meds - amlodipine, metoprolol, losartan  Monitor vitals      Plan of care discussed with patient or family  at bedside.      Supplementary Documentation:     Quality:  DVT Prophylaxis: SCDs  CODE status: Full      Estimated date of discharge: TBD  Discharge is dependent on: clinical stability  At this point Ms. Noel is expected to be discharge to: home                   Sergio Hendricks MD  Hospitalist    MDM: High, I personally reviewed the available laboratories, imaging. I discussed the case with RN. I ordered laboratories, studies including AM labs.  Medical decision making high, risk is high.       The 21st Century Cures Act makes medical notes like these available to patients in the interest of transparency. Please be advised this is a medical document. Medical documents are intended to carry relevant information, facts as evident, and the clinical opinion of the practitioner. The medical note is intended as peer to peer communication and may appear blunt or direct. It is written in medical language and may contain abbreviations or verbiage that are unfamiliar.

## 2025-01-12 NOTE — DISCHARGE SUMMARY
[facility]  part of [facility]    DISCHARGE SUMMARY     Tami Noel Patient Status:  Inpatient    2/15/1965 MRN [id number]   Location [facility] 4W/SW/SE Attending Bogdan Pena MD   Hosp Day # 2 PCP RADHA ANDRE     Date of Admission: 1/10/2025  Date of Discharge:  2025    Discharge Disposition: Home or Self Care    Discharge Diagnosis:     Lumbar radiculopathy s/p L4/5 lumbar interbody fusion  HTN    History of Present Illness:     Patient is a 59-year-old  female with chronic back pain and lumbar radiculopathy at level L4-L5 with disc protrusion with right more than left foraminal stenosis on imaging studies, failed outpatient conservative medical management options. Scheduled today for above-mentioned procedure by her orthopedic surgeon, Dr. Pena. Postoperatively, transferred to PACU for further monitoring.     Brief Synopsis:     Lumbar radiculopathy s/p L4/5 lumbar interbody fusion  Tolerated surgery well  IVF, abx, dvt ppx and pain meds per surgery team  PT/OT  HTN  BP elevated  Continue home meds - amlodipine, metoprolol, losartan  Monitor vitals  Patient is to follow up with PCP and Ortho as opt. Discharge meds including instructions for ASA and pain meds ordered by ortho.   Close opt follow up.  Patient is to remain compliant with all discharge medications and instructions and to follow up as advised.   Patient encouraged to make healthy lifestyle and dietary changes.    Lace+ Score: 45  59-90 High Risk  29-58 Medium Risk  0-28   Low Risk       TCM Follow-Up Recommendation:  LACE 29-58: Moderate Risk of readmission after discharge from the hospital.      Procedures during hospitalization:   L4/5 interbody fusion    Incidental or significant findings and recommendations (brief descriptions):  None    Lab/Test results pending at Discharge:   None    Consultants:  Ortho    Discharge Medication List:     Discharge Medications        CONTINUE taking these  medications        Instructions Prescription details   amLODIPine 2.5 MG Tabs  Commonly known as: Norvasc      Take 1 tablet (2.5 mg total) by mouth nightly.   Refills: 0     aspirin 81 MG Chew      Chew 1 tablet (81 mg total) by mouth daily.   Quantity: 30 tablet  Refills: 1     CALCIUM 1200 OR      Take by mouth 2 (two) times daily.   Refills: 0     Citrucel 500 MG Tabs  Generic drug: Methylcellulose (Laxative)      Take 6 tablets by mouth in the morning and 6 tablets before bedtime.   Refills: 0     losartan 50 MG Tabs  Commonly known as: Cozaar      Take 1 tablet (50 mg total) by mouth 2 (two) times daily.   Refills: 0     metoprolol succinate ER 50 MG Tb24  Commonly known as: Toprol XL      Take 1 tablet (50 mg total) by mouth daily.   Refills: 0     Midol 650 MG Tbcr  Generic drug: Acetaminophen ER      Take 1 tablet (650 mg total) by mouth every 8 (eight) hours as needed for Pain.   Refills: 0     Multi-Vitamin/Minerals Tabs      Take 1 tablet by mouth daily.   Refills: 0              [place]    Follow-up appointment:   Bogdan Pena MD  [address] [facility] [address]  [phone]    Follow up in 2 week(s)      Nikko Leyva  [address]  [facility] [id number] [phone]    Follow up in 1 week(s)      Appointments for Next 30 Days 1/12/2025 - 2/11/2025      None            Vital signs:  Temp:  [98.2 °F (36.8 °C)-99.8 °F (37.7 °C)] 99.8 °F (37.7 °C)  Pulse:  [78-87] 87  Resp:  [18] 18  BP: (129-156)/(68-80) 129/68  SpO2:  [93 %-97 %] 93 %    Physical Exam:    Gen: NAD AO x3  Chest: good air entry CTABL  CVS: normal s1 and s2 RR  Abd: NABS soft NT ND  Neuro: CN 2-12 grossly intact  Ext: no edema in bilateral LE    -----------------------------------------------------------------------------------------------  PATIENT DISCHARGE INSTRUCTIONS: See electronic chart    Sergio Hendricks MD  Hospitalist    Time spent:  > 30 minutes    The 21st Century Cures Act makes medical  notes like these available to patients in the interest of transparency. Please be advised this is a medical document. Medical documents are intended to carry relevant information, facts as evident, and the clinical opinion of the practitioner. The medical note is intended as peer to peer communication and may appear blunt or direct. It is written in medical language and may contain abbreviations or verbiage that are unfamiliar.

## 2025-01-12 NOTE — PHYSICAL THERAPY NOTE
PHYSICAL THERAPY TREATMENT NOTE - INPATIENT     Room Number: 437/437-A       Presenting Problem: Lumbar fusion       Problem List  Active Problems:    Lumbar radiculopathy    Primary hypertension    S/P lumbar fusion      PHYSICAL THERAPY ASSESSMENT   Patient demonstrates good  progress this session, goals  remain in progress.      Patient is requiring stand-by assist as a result of the following impairments: decreased functional strength, decreased endurance/aerobic capacity, pain, and decreased muscular endurance.     Patient continues to function near baseline with bed mobility, transfers, gait, stair negotiation, and standing prolonged periods.  Next session anticipate patient to progress bed mobility, transfers, gait, stair negotiation, and standing prolonged periods.  Physical Therapy will continue to follow patient for duration of hospitalization.    Patient continues to benefit from continued skilled PT services: with no additional skilled services but increased support at home.    PLAN DURING HOSPITALIZATION  Nursing Mobility Recommendation : 1 Assist     PT Treatment Plan: Bed mobility;Body mechanics;Coordination;Endurance;Energy conservation;Patient education;Gait training;Strengthening;Stoop training;Stair training;Transfer training;Balance training  Frequency (Obs): Daily     SUBJECTIVE  Pt was agreeable to therapy session.         OBJECTIVE  Precautions: Spine    WEIGHT BEARING RESTRICTION       PAIN ASSESSMENT   Rating:  (did not rate)  Location: back  Management Techniques: Activity promotion;Body mechanics;Relaxation;Repositioning    BALANCE  Static Sitting: Good  Dynamic Sitting: Good  Static Standing: Fair +  Dynamic Standing: Fair    ACTIVITY TOLERANCE                          O2 WALK       AM-PAC '6-Clicks' INPATIENT SHORT FORM - BASIC MOBILITY  How much difficulty does the patient currently have...  Patient Difficulty: Turning over in bed (including adjusting bedclothes, sheets and blankets)?:  A Little   Patient Difficulty: Sitting down on and standing up from a chair with arms (e.g., wheelchair, bedside commode, etc.): A Little   Patient Difficulty: Moving from lying on back to sitting on the side of the bed?: A Little   How much help from another person does the patient currently need...   Help from Another: Moving to and from a bed to a chair (including a wheelchair)?: A Little   Help from Another: Need to walk in hospital room?: A Little   Help from Another: Climbing 3-5 steps with a railing?: A Little     AM-PAC Score:  Raw Score: 18   Approx Degree of Impairment: 46.58%   Standardized Score (AM-PAC Scale): 43.63   CMS Modifier (G-Code): CK    FUNCTIONAL ABILITY STATUS  Functional Mobility/Gait Assessment  Gait Assistance: Supervision  Distance (ft): 400'  Assistive Device: None  Pattern:  (narrow MARIA LUISA)  Stairs: Stairs  How Many Stairs: 4  Device: 2 Rails  Assist: Supervision  Pattern: Ascend and Descend  Ascend and Descend : Reciprocal  Rolling: stand-by assist  Supine to Sit: stand-by assist  Sit to Supine: stand-by assist  Sit to Stand: stand-by assist    Skilled Therapy Provided: Pt is received in the bed and was cleared for therapy session. Pt reviewed and able to maintain all her spinal precautions throughout the session. Pt is SBA with all mobilities and AMB. Pt AMB about 400' with the no AD SBA for balance and safety. Pt AMB to the therapy gym for stair training. Pt was educated and able to negotiate 4 stairs with 2 HR's SBA for safety. Pt is cued for proper technique and sequencing. Pt with very good balance on the stairs. Returned pt back to the room and back to the bed. Pt is left in the bed with all needs within reach. Pt is on track to dc to home once medically cleared. Reported to the RN on the status of the pt.     The patient's Approx Degree of Impairment: 46.58% has been calculated based on documentation in the [facility] '6 clicks' Inpatient Daily Activity Short Form.  Research supports  that patients with this level of impairment may benefit from Home with assist.  Final disposition will be made by interdisciplinary medical team.        Patient End of Session: In bed;Needs met;Call light within reach;RN aware of session/findings;All patient questions and concerns addressed;Hospital anti-slip socks    CURRENT GOALS   Goals to be met by: 1/15/2025  Patient Goal Patient's self-stated goal is: to go home   Goal #1 Patient is able to demonstrate supine - sit EOB @ level: independent     Goal #1   Current Status SBA    Goal #2 Patient is able to demonstrate transfers EOB to/from BSC at assistance level: independent with none     Goal #2  Current Status SBA with no AD    Goal #3 Patient is able to ambulate  500 feet with assist device: none at assistance level: independent   Goal #3   Current Status 400' with no AD SBA    Goal #4 Patient will negotiate 2 stairs/one curb w/ assistive device and supervision   Goal #4   Current Status 4 stairs with 2 HR's SBA    Goal #5 Patient to demonstrate independence with home activity/exercise instructions provided to patient in preparation for discharge.   Goal #5   Current Status IN PROGRESS   Goal #6    Goal #6  Current Status        Therapeutic Activity: 23 minutes

## 2025-01-12 NOTE — PROGRESS NOTES
Spine Surgery Progress Note     Interval history     Afebrile vital signs stable         Objective   Lab Results   Component Value Date    WBC 16.6 01/12/2025    HGB 12.8 01/12/2025    HCT 36.0 01/12/2025    .0 01/12/2025        Vitals:    01/12/25 0500   BP: 152/76   Pulse: 78   Resp: 18   Temp: 99.6 °F (37.6 °C)       Examination       IP Q TA EHL GS  R    5 5 5 5 5    L    5 5 5 5 5    Sensation intact to light touch LE dermatomes bilateral        Assessment  POD 2    Plan   -DVT ppx with SCD, stockings, and ambulation  -continue PT/OT eval and treatment  -postoperative radiograph  -regular diet   -pain control  -progress toward discharge - plan for today     Oli Thomas MD

## 2025-01-12 NOTE — PLAN OF CARE
Patient is alert and oriented. On RA. SCDs on for DVT prophylaxis. Voiding freely. Up x1 with walker. PRN oxy, dilaudid, zanaflex and valium given for pain management. Dressing in place to lower back, CDI. Call light within reach, bed alarm active.    Problem: Patient Centered Care  Goal: Patient preferences are identified and integrated in the patient's plan of care  Description: Interventions:  - Provide timely, complete, and accurate information to patient/family  - Incorporate patient and family knowledge, values, beliefs, and cultural backgrounds into the planning and delivery of care  - Encourage patient/family to participate in care and decision-making at the level they choose  - Honor patient and family perspectives and choices  Outcome: Progressing     Problem: PAIN - ADULT  Goal: Verbalizes/displays adequate comfort level or patient's stated pain goal  Description: INTERVENTIONS:  - Encourage pt to monitor pain and request assistance  - Assess pain using appropriate pain scale  - Administer analgesics based on type and severity of pain and evaluate response  - Implement non-pharmacological measures as appropriate and evaluate response  - Consider cultural and social influences on pain and pain management  - Manage/alleviate anxiety  - Utilize distraction and/or relaxation techniques  - Monitor for opioid side effects  - Notify MD/LIP if interventions unsuccessful or patient reports new pain  - Anticipate increased pain with activity and pre-medicate as appropriate  Outcome: Progressing     Problem: RISK FOR INFECTION - ADULT  Goal: Absence of fever/infection during anticipated neutropenic period  Description: INTERVENTIONS  - Monitor WBC  - Administer growth factors as ordered  - Implement neutropenic guidelines  Outcome: Progressing     Problem: SAFETY ADULT - FALL  Goal: Free from fall injury  Description: INTERVENTIONS:  - Assess pt frequently for physical needs  - Identify cognitive and physical  deficits and behaviors that affect risk of falls.  - [place] fall precautions as indicated by assessment.  - Educate pt/family on patient safety including physical limitations  - Instruct pt to call for assistance with activity based on assessment  - Modify environment to reduce risk of injury  - Provide assistive devices as appropriate  - Consider OT/PT consult to assist with strengthening/mobility  - Encourage toileting schedule  Outcome: Progressing     Problem: DISCHARGE PLANNING  Goal: Discharge to home or other facility with appropriate resources  Description: INTERVENTIONS:  - Identify barriers to discharge w/pt and caregiver  - Include patient/family/discharge partner in discharge planning  - Arrange for needed discharge resources and transportation as appropriate  - Identify discharge learning needs (meds, wound care, etc)  - Arrange for interpreters to assist at discharge as needed  - Consider post-discharge preferences of patient/family/discharge partner  - Complete POLST form as appropriate  - Assess patient's ability to be responsible for managing their own health  - Refer to Case Management Department for coordinating discharge planning if the patient needs post-hospital services based on physician/LIP order or complex needs related to functional status, cognitive ability or social support system  Outcome: Progressing

## 2025-01-12 NOTE — PLAN OF CARE
Problem: Patient Centered Care  Goal: Patient preferences are identified and integrated in the patient's plan of care  Description: Interventions:  - What would you like us to know as we care for you? I would like my pain to be under control before I go home.  - Provide timely, complete, and accurate information to patient/family  - Incorporate patient and family knowledge, values, beliefs, and cultural backgrounds into the planning and delivery of care  - Encourage patient/family to participate in care and decision-making at the level they choose  - Honor patient and family perspectives and choices  Outcome: Progressing     Problem: Patient/Family Goals  Goal: Patient/Family Long Term Goal  Description: Patient's Long Term Goal: to have pain-free ambulation.    Interventions:  - surgery, physical therapy. Pain management.  - See additional Care Plan goals for specific interventions  Outcome: Progressing  Goal: Patient/Family Short Term Goal  Description: Patient's Short Term Goal: to go home after the hospital stay.    Interventions:   - physical therapy. Pain management.  - See additional Care Plan goals for specific interventions  Outcome: Progressing     Problem: PAIN - ADULT  Goal: Verbalizes/displays adequate comfort level or patient's stated pain goal  Description: INTERVENTIONS:  - Encourage pt to monitor pain and request assistance  - Assess pain using appropriate pain scale  - Administer analgesics based on type and severity of pain and evaluate response  - Implement non-pharmacological measures as appropriate and evaluate response  - Consider cultural and social influences on pain and pain management  - Manage/alleviate anxiety  - Utilize distraction and/or relaxation techniques  - Monitor for opioid side effects  - Notify MD/LIP if interventions unsuccessful or patient reports new pain  - Anticipate increased pain with activity and pre-medicate as appropriate  Outcome: Progressing     Problem: RISK FOR  INFECTION - ADULT  Goal: Absence of fever/infection during anticipated neutropenic period  Description: INTERVENTIONS  - Monitor WBC  - Administer growth factors as ordered  - Implement neutropenic guidelines  Outcome: Progressing     Problem: SAFETY ADULT - FALL  Goal: Free from fall injury  Description: INTERVENTIONS:  - Assess pt frequently for physical needs  - Identify cognitive and physical deficits and behaviors that affect risk of falls.  - [place] fall precautions as indicated by assessment.  - Educate pt/family on patient safety including physical limitations  - Instruct pt to call for assistance with activity based on assessment  - Modify environment to reduce risk of injury  - Provide assistive devices as appropriate  - Consider OT/PT consult to assist with strengthening/mobility  - Encourage toileting schedule  Outcome: Progressing     Problem: DISCHARGE PLANNING  Goal: Discharge to home or other facility with appropriate resources  Description: INTERVENTIONS:  - Identify barriers to discharge w/pt and caregiver  - Include patient/family/discharge partner in discharge planning  - Arrange for needed discharge resources and transportation as appropriate  - Identify discharge learning needs (meds, wound care, etc)  - Arrange for interpreters to assist at discharge as needed  - Consider post-discharge preferences of patient/family/discharge partner  - Complete POLST form as appropriate  - Assess patient's ability to be responsible for managing their own health  - Refer to Case Management Department for coordinating discharge planning if the patient needs post-hospital services based on physician/LIP order or complex needs related to functional status, cognitive ability or social support system  Outcome: Progressing   Walked in hallway. Pain is under control with oral pain meds. Already has scripts for pain meds sent to her pharmacy by surgery. Kept for one more day to see if her pain could be maintained  with Oxycodone 5 mg PO instead of 10 mg.

## 2025-01-13 VITALS
HEART RATE: 74 BPM | TEMPERATURE: 98 F | HEIGHT: 60 IN | RESPIRATION RATE: 18 BRPM | OXYGEN SATURATION: 99 % | BODY MASS INDEX: 23.56 KG/M2 | SYSTOLIC BLOOD PRESSURE: 143 MMHG | DIASTOLIC BLOOD PRESSURE: 88 MMHG | WEIGHT: 120 LBS

## 2025-01-13 LAB
ALBUMIN SERPL-MCNC: 3.8 G/DL (ref 3.2–4.8)
ALBUMIN/GLOB SERPL: 1.3 {RATIO} (ref 1–2)
ALP LIVER SERPL-CCNC: 56 U/L
ALT SERPL-CCNC: 13 U/L
ANION GAP SERPL CALC-SCNC: 6 MMOL/L (ref 0–18)
AST SERPL-CCNC: 34 U/L (ref ?–34)
BASOPHILS # BLD AUTO: 0.12 X10(3) UL (ref 0–0.2)
BASOPHILS NFR BLD AUTO: 0.8 %
BILIRUB SERPL-MCNC: 0.7 MG/DL (ref 0.2–1.1)
BUN BLD-MCNC: 14 MG/DL (ref 9–23)
BUN/CREAT SERPL: 20.3 (ref 10–20)
CALCIUM BLD-MCNC: 9.1 MG/DL (ref 8.7–10.4)
CHLORIDE SERPL-SCNC: 105 MMOL/L (ref 98–112)
CO2 SERPL-SCNC: 28 MMOL/L (ref 21–32)
CREAT BLD-MCNC: 0.69 MG/DL
DEPRECATED RDW RBC AUTO: 45.1 FL (ref 35.1–46.3)
EGFRCR SERPLBLD CKD-EPI 2021: 99 ML/MIN/1.73M2 (ref 60–?)
EOSINOPHIL # BLD AUTO: 0.13 X10(3) UL (ref 0–0.7)
EOSINOPHIL NFR BLD AUTO: 0.9 %
ERYTHROCYTE [DISTWIDTH] IN BLOOD BY AUTOMATED COUNT: 13.2 % (ref 11–15)
GLOBULIN PLAS-MCNC: 2.9 G/DL (ref 2–3.5)
GLUCOSE BLD-MCNC: 92 MG/DL (ref 70–99)
HCT VFR BLD AUTO: 35.4 %
HGB BLD-MCNC: 11.9 G/DL
IMM GRANULOCYTES # BLD AUTO: 0.05 X10(3) UL (ref 0–1)
IMM GRANULOCYTES NFR BLD: 0.3 %
LYMPHOCYTES # BLD AUTO: 2.64 X10(3) UL (ref 1–4)
LYMPHOCYTES NFR BLD AUTO: 17.8 %
MCH RBC QN AUTO: 31.3 PG (ref 26–34)
MCHC RBC AUTO-ENTMCNC: 33.6 G/DL (ref 31–37)
MCV RBC AUTO: 93.2 FL
MONOCYTES # BLD AUTO: 1.89 X10(3) UL (ref 0.1–1)
MONOCYTES NFR BLD AUTO: 12.7 %
NEUTROPHILS # BLD AUTO: 10.03 X10 (3) UL (ref 1.5–7.7)
NEUTROPHILS # BLD AUTO: 10.03 X10(3) UL (ref 1.5–7.7)
NEUTROPHILS NFR BLD AUTO: 67.5 %
OSMOLALITY SERPL CALC.SUM OF ELEC: 288 MOSM/KG (ref 275–295)
PLATELET # BLD AUTO: 214 10(3)UL (ref 150–450)
POTASSIUM SERPL-SCNC: 3.6 MMOL/L (ref 3.5–5.1)
PROT SERPL-MCNC: 6.7 G/DL (ref 5.7–8.2)
RBC # BLD AUTO: 3.8 X10(6)UL
SODIUM SERPL-SCNC: 139 MMOL/L (ref 136–145)
WBC # BLD AUTO: 14.9 X10(3) UL (ref 4–11)

## 2025-01-13 PROCEDURE — 99239 HOSP IP/OBS DSCHRG MGMT >30: CPT | Performed by: INTERNAL MEDICINE

## 2025-01-13 RX ORDER — DEXAMETHASONE SODIUM PHOSPHATE 4 MG/ML
10 VIAL (ML) INJECTION ONCE
Status: COMPLETED | OUTPATIENT
Start: 2025-01-13 | End: 2025-01-13

## 2025-01-13 NOTE — PLAN OF CARE
Monitoring vital signs, stable at this time. Pain medication provided as needed. Encouraging ambulation/ participation for PT/ OT. No acute changes at this moment. Safety and fall precautions in place, bed locked and in lowest position, call light in reach and non skid socks in place. Frequent rounding by nursing staff.  Patient going home today with . Discharge papers gone over with. No further questions asked.     Problem: Patient Centered Care  Goal: Patient preferences are identified and integrated in the patient's plan of care  Description: Interventions:  - What would you like us to know as we care for you?   - Provide timely, complete, and accurate information to patient/family  - Incorporate patient and family knowledge, values, beliefs, and cultural backgrounds into the planning and delivery of care  - Encourage patient/family to participate in care and decision-making at the level they choose  - Honor patient and family perspectives and choices  Outcome: Progressing     Problem: Patient/Family Goals  Goal: Patient/Family Long Term Goal  Description: Patient's Long Term Goal: to have pain-free ambulation.    Interventions:  - surgery, physical therapy. Pain management.  - See additional Care Plan goals for specific interventions  Outcome: Progressing  Goal: Patient/Family Short Term Goal  Description: Patient's Short Term Goal: to go home after the hospital stay.    Interventions:   - physical therapy. Pain management.  - See additional Care Plan goals for specific interventions  Outcome: Progressing     Problem: PAIN - ADULT  Goal: Verbalizes/displays adequate comfort level or patient's stated pain goal  Description: INTERVENTIONS:  - Encourage pt to monitor pain and request assistance  - Assess pain using appropriate pain scale  - Administer analgesics based on type and severity of pain and evaluate response  - Implement non-pharmacological measures as appropriate and evaluate response  - Consider  cultural and social influences on pain and pain management  - Manage/alleviate anxiety  - Utilize distraction and/or relaxation techniques  - Monitor for opioid side effects  - Notify MD/LIP if interventions unsuccessful or patient reports new pain  - Anticipate increased pain with activity and pre-medicate as appropriate  Outcome: Progressing     Problem: RISK FOR INFECTION - ADULT  Goal: Absence of fever/infection during anticipated neutropenic period  Description: INTERVENTIONS  - Monitor WBC  - Administer growth factors as ordered  - Implement neutropenic guidelines  Outcome: Progressing     Problem: SAFETY ADULT - FALL  Goal: Free from fall injury  Description: INTERVENTIONS:  - Assess pt frequently for physical needs  - Identify cognitive and physical deficits and behaviors that affect risk of falls.  - [place] fall precautions as indicated by assessment.  - Educate pt/family on patient safety including physical limitations  - Instruct pt to call for assistance with activity based on assessment  - Modify environment to reduce risk of injury  - Provide assistive devices as appropriate  - Consider OT/PT consult to assist with strengthening/mobility  - Encourage toileting schedule  Outcome: Progressing     Problem: DISCHARGE PLANNING  Goal: Discharge to home or other facility with appropriate resources  Description: INTERVENTIONS:  - Identify barriers to discharge w/pt and caregiver  - Include patient/family/discharge partner in discharge planning  - Arrange for needed discharge resources and transportation as appropriate  - Identify discharge learning needs (meds, wound care, etc)  - Arrange for interpreters to assist at discharge as needed  - Consider post-discharge preferences of patient/family/discharge partner  - Complete POLST form as appropriate  - Assess patient's ability to be responsible for managing their own health  - Refer to Case Management Department for coordinating discharge planning if the  patient needs post-hospital services based on physician/LIP order or complex needs related to functional status, cognitive ability or social support system  Outcome: Progressing

## 2025-01-13 NOTE — PAYOR COMM NOTE
--------------  ADMISSION REVIEW     Payor: KATELYN CURRIE  Subscriber #:  [id number]  Authorization Number: [id number]    Admit date: 1/10/25  Admit time:  8:41 AM       REVIEW DOCUMENTATION:  ED Provider Notes    No notes of this type exist for this encounter.         MEDICATIONS ADMINISTERED IN LAST 1 DAY:  amLODIPine (Norvasc) tab 2.5 mg       Date Action Dose Route User    1/12/2025 2024 Given 2.5 mg Oral Iveth Jimenez RN          diazePAM (Valium) tab 5 mg       Date Action Dose Route User    1/13/2025 0937 Given 5 mg Oral Shirley Khan RN    1/13/2025 0022 Given 5 mg Oral Iveth Jimenez RN          docusate sodium (Colace) cap 100 mg       Date Action Dose Route User    1/13/2025 0936 Given 100 mg Oral Shirley Khan RN    1/12/2025 2024 Given 100 mg Oral Iveth Jimenez RN          losartan (Cozaar) tab 50 mg       Date Action Dose Route User    1/13/2025 0937 Given 50 mg Oral Shirley Khan RN    1/12/2025 2024 Given 50 mg Oral Iveth Jimenez RN          metoprolol succinate ER (Toprol XL) 24 hr tab 50 mg       Date Action Dose Route User    1/13/2025 0937 Given 50 mg Oral Shirley Khan RN          ondansetron (Zofran) 4 MG/2ML injection 4 mg       Date Action Dose Route User    1/13/2025 0943 Given 4 mg Intravenous Shirley Khan RN    1/12/2025 1750 Given 4 mg Intravenous Sonia Cotto RN          oxyCODONE immediate release tab 10 mg       Date Action Dose Route User    1/13/2025 0602 Given 10 mg Oral Iveth Jimenez RN    1/13/2025 0202 Given 10 mg Oral Iveth Jimenez RN    1/12/2025 2200 Given 10 mg Oral Iveth Jimenez RN    1/12/2025 1755 Given 10 mg Oral Sonia Cotto RN          polyethylene glycol (PEG 3350) (Miralax) 17 g oral packet 17 g       Date Action Dose Route User    1/13/2025 0943 Given 17 g Oral Ray, Shirley, RN          sennosides (Senokot) tab 17.2 mg       Date Action Dose Route User    1/12/2025 2024 Given 17.2 mg Oral Iveth Jimenez, RN          tiZANidine (Zanaflex) tab 2 mg        Date Action Dose Route User    2025 1834 Given 2 mg Oral Sonia Cotto, RN            Vitals (last day)       Date/Time Temp Pulse Resp BP SpO2 Weight O2 Device O2 Flow Rate (L/min) Who    25 0832 98.3 °F (36.8 °C) 77 18 136/88 96 % -- None (Room air) -- ES    25 0554 98.5 °F (36.9 °C) 74 18 118/70 94 % -- None (Room air) -- AS    25 98 °F (36.7 °C) 72 18 117/68 93 % -- None (Room air) -- NH    25 1619 98.4 °F (36.9 °C) 74 18 125/83 94 % -- None (Room air) -- NT    25 0831 99.8 °F (37.7 °C) 87 18 129/68 93 % -- None (Room air) -- NT    25 0500 99.6 °F (37.6 °C) 78 18 152/76 93 % -- None (Room air) -- AS          1/10/2025 H&P  [facility]  part of [facility]     History & Physical           Tamiedvin Noel Patient Status:  Inpatient    2/15/1965 MRN [id number]   Location [facility] PRE OP RECOVERY Attending Bogdan Pena MD   Hosp Day # 0 FAROOQ ANDRE      Date:  1/10/2025  Date of Admission:  1/10/2025     History:  Past Medical History       Past Medical History:    Back problem     lumbar back pain    Hepatitis C     caused by blood transfusion- since resolved    High blood pressure    History of blood transfusion     ITP- no reaction    ITP (idiopathic thrombocytopenic purpura)    Migraine    Migraines    Osteopenia    Stroke (HCC)    TIA (transient ischemic attack)         Past Surgical History         Past Surgical History:   Procedure Laterality Date       2001     x1    Colonoscopy         2024    Egd scope   10/08,     Sinus surgery     1991    Splenectomy        Us biopsy liver ([id number])   2012         Family History         Family History   Problem Relation Age of Onset    Hypertension Father      Other (Other) Father           arthritis    Stroke Mother      Hypertension Mother      Breast Cancer Paternal Grandmother           77 for 2nd reoccurrance          reports that she  has never smoked. She has never been exposed to tobacco smoke. She has never used smokeless tobacco. She reports that she does not currently use alcohol. She reports that she does not use drugs.     Allergies:  [Allergies]    [Allergies]        Allergen Reactions    Asa [Aspirin] OTHER (SEE COMMENTS)       Hx ITP    Sulfa Antibiotics HIVES       Other reaction(s): Rash        Home Medications:  [Prescriptions Prior to Admission]    [Prescriptions Prior to Admission]          Medications Prior to Admission   Medication Sig Dispense Refill Last Dose/Taking    metoprolol succinate ER 50 MG Oral Tablet 24 Hr Take 1 tablet (50 mg total) by mouth daily.     1/8/2025    Acetaminophen ER (MIDOL) 650 MG Oral Tab CR Take 1 tablet (650 mg total) by mouth every 8 (eight) hours as needed for Pain.     1/8/2025    losartan 50 MG Oral Tab Take 1 tablet (50 mg total) by mouth 2 (two) times daily.     1/8/2025    Methylcellulose, Laxative, (CITRUCEL) 500 MG Oral Tab Take 6 tablets by mouth in the morning and 6 tablets before bedtime.     1/2/2025    amLODIPine 2.5 MG Oral Tab Take 1 tablet (2.5 mg total) by mouth nightly.     1/8/2025    Multiple Vitamins-Minerals (MULTI-VITAMIN/MINERALS) Oral Tab Take 1 tablet by mouth daily.     1/2/2025    CALCIUM 1200 OR Take by mouth 2 (two) times daily.     1/2/2025    aspirin 81 MG Oral Chew Tab Chew 1 tablet (81 mg total) by mouth daily. 30 tablet 1 1/2/2025        Review of Systems:  12 point ROS reviewed without pertinent positives.      HPI: The patient presents with complaints of lumbar radiculopathy. The pain radiates from the buttock/gluteal region to the bilateral lateral hips and right anterior thigh.  She reports very minimal pain in the back.  She had prior lumbar spine surgery in the form of a microdiscectomy in 11/2023.  They deny current fevers, chills, infection, rashes/bruises on the body, gross bowel/bladder incontinence or saddle anesthesia.      Physical Exam:  The patient  is well developed, no acute distress, alert and oriented x3, skin intact to the posterior lumbar without erythema or edema, prior scar well healed, motor exam with 4-/5 EHL on  the left, otherwise 5/5 bilateral lower extremities, calves soft and non tender, 2+DP        Assessment:      Patient Active Problem List   Diagnosis    Chronic hepatitis C without hepatic coma (HCC)    Cerebrovascular accident (CVA) (HCC)    Cerebrovascular accident (CVA), unspecified mechanism (HCC)    Internal carotid artery dissection (HCC)    Cervical polyp    Abdominal bloating with cramps    Pelvic pain    Right lumbar radiculitis    History of CVA (cerebrovascular accident)    Lumbar spinal stenosis    Primary hypertension      Lumbar spondylolisthesis  Lumbar stenosis  Lumbar extraforaminal L4-5 HNP     Plan:  L4-5 MIS TLIF        Mihaela Luu PA-C  1/10/2025      1/10/2025 Anesthesia Pre procedure note  Kayli Gross MD   Anesthesiologist  Anesthesiology     Anesthesia Preprocedure Evaluation     Addendum     Date of Service: 1/10/2025 11:31 AM   suggestion  Primary        Expand All Collapse All[]Expand All by Default    Anesthesia PreOp Note     HPI:     Tami Noel is a 59 year old female who presents for preoperative consultation requested by: Bogdan Pena MD     Date of Surgery: 1/10/2025     Procedure(s):  L4-5 minimally invasive transforaminal lumbar interbody fusion  Indication: Lumbar radiculopathy, lumbar stenosis, lumbar spondylolisthesis     Relevant Problems   No relevant active problems         NPO:  Last Liquid Consumption Date: 01/09/25  Last Liquid Consumption Time: 1800  Last Solid Consumption Date: 01/09/25  Last Solid Consumption Time: 1800  Last Liquid Consumption Date: 01/09/25     History Review:       Patient Active Problem List     Diagnosis Date Noted    Lumbar spinal stenosis 12/11/2024    Primary hypertension 12/11/2024    Right lumbar radiculitis 11/09/2023    History of CVA  (cerebrovascular accident) 2023    Abdominal bloating with cramps 2022    Pelvic pain 2022    Cervical polyp 2020    Cerebrovascular accident (CVA) (HCC) 2020    Cerebrovascular accident (CVA), unspecified mechanism (HCC) 2020    Internal carotid artery dissection (HCC) 2020    Chronic hepatitis C without hepatic coma (HCC) 2016         Past Medical History       Past Medical History:    Back problem     lumbar back pain    Hepatitis C     caused by blood transfusion- since resolved    High blood pressure    History of blood transfusion     ITP- no reaction    ITP (idiopathic thrombocytopenic purpura)    Migraine    Migraines    Osteopenia    Stroke (HCC)    TIA (transient ischemic attack)            Past Surgical History         Past Surgical History:   Procedure Laterality Date       2001     x1    Colonoscopy         ,     Egd scope   10/08,     Sinus surgery         Splenectomy        Us biopsy liver ([id number])   2012            [Prescriptions Prior to Admission]    [Prescriptions Prior to Admission]          Medications Prior to Admission   Medication Sig Dispense Refill Last Dose/Taking    metoprolol succinate ER 50 MG Oral Tablet 24 Hr Take 1 tablet (50 mg total) by mouth daily.     2025    Acetaminophen ER (MIDOL) 650 MG Oral Tab CR Take 1 tablet (650 mg total) by mouth every 8 (eight) hours as needed for Pain.     2025    losartan 50 MG Oral Tab Take 1 tablet (50 mg total) by mouth 2 (two) times daily.     2025    Methylcellulose, Laxative, (CITRUCEL) 500 MG Oral Tab Take 6 tablets by mouth in the morning and 6 tablets before bedtime.     2025    amLODIPine 2.5 MG Oral Tab Take 1 tablet (2.5 mg total) by mouth nightly.     2025    Multiple Vitamins-Minerals (MULTI-VITAMIN/MINERALS) Oral Tab Take 1 tablet by mouth daily.     2025    CALCIUM 1200 OR Take by mouth 2 (two) times daily.     2025     aspirin 81 MG Oral Chew Tab Chew 1 tablet (81 mg total) by mouth daily. 30 tablet 1 1/2/2025     [Current Medications and Prescriptions Ordered in Epic]    [Current Medications and Prescriptions Ordered in Epic]            Current Facility-Administered Medications Ordered in Epic   Medication Dose Route Frequency Provider Last Rate Last Admin    ceFAZolin (Ancef) 2g in 10mL IV syringe premix  2 g Intravenous Once Bogdan Pena MD        sodium chloride 0.9% infusion   Intravenous Continuous Bogdan Pena MD 83 mL/hr at 01/10/25 0950 New Bag at 01/10/25 0950      No current [facility]-ordered outpatient medications on file.        [Allergies]    [Allergies]        Allergen Reactions    Asa [Aspirin] OTHER (SEE COMMENTS)       Hx ITP    Sulfa Antibiotics HIVES       Other reaction(s): Rash        Family History         Family History   Problem Relation Age of Onset    Hypertension Father      Other (Other) Father           arthritis    Stroke Mother      Hypertension Mother      Breast Cancer Paternal Grandmother           77 for 2nd reoccurrance         Social Hx on file   Social History            Socioeconomic History    Marital status:    Tobacco Use    Smoking status: Never       Passive exposure: Never    Smokeless tobacco: Never   Vaping Use    Vaping status: Never Used   Substance and Sexual Activity    Alcohol use: Not Currently       Alcohol/week: 0.0 standard drinks of alcohol       Comment: RARELY    Drug use: No    Sexual activity: Yes       Partners: Male   Other Topics Concern    Caffeine Concern No            Available pre-op labs reviewed.        Lab Results   Component Value Date     WBC 6.9 12/19/2024     RBC 4.76 12/19/2024     HGB 14.9 12/19/2024     HCT 43.8 12/19/2024     MCV 92.0 12/19/2024     MCH 31.3 12/19/2024     MCHC 34.0 12/19/2024     RDW 13.2 12/19/2024     .0 (L) 12/19/2024            Lab Results   Component Value Date      12/19/2024     K 4.1 12/19/2024       12/19/2024     CO2 31.0 12/19/2024     BUN 26 (H) 12/19/2024     CREATSERUM 1.06 (H) 12/19/2024     GLU 90 12/19/2024     CA 10.0 12/19/2024         Vital Signs:  Body mass index is 23.44 kg/m².   height is 1.524 m (5') and weight is 54.4 kg (120 lb). Her oral temperature is 98.6 °F (37 °C). Her blood pressure is 143/84 and her pulse is 69. Her respiration is 16 and oxygen saturation is 96%.        Vitals:     01/06/25 1024 01/10/25 0908   BP:   143/84   Pulse:   69   Resp:   16   Temp:   98.6 °F (37 °C)   TempSrc:   Oral   SpO2:   96%   Weight: 53.1 kg (117 lb) 54.4 kg (120 lb)   Height: 1.524 m (5')            Anesthesia Evaluation      Patient summary reviewed    Airway   Mallampati: II  TM distance: >3 FB  Neck ROM: full  Dental - Dentition appears grossly intact     Pulmonary     breath sounds clear to auscultation  (-) COPD, sleep apnea  Cardiovascular   (+) hypertension  (-) past MI, dysrhythmias     ECG reviewed  Rhythm: regular  Rate: normal    Neuro/Psych    (+)  neuromuscular disease, TIA, CVA,          GI/Hepatic/Renal    (+) hepatitis C, liver disease    Endo/Other    (-) diabetes mellitus  Abdominal      Abdomen: soft.                     Anesthesia Plan:   ASA:  3  Plan:   General  Airway:  ETT  Post-op Pain Management: IV analgesics and Oral pain medication  Informed Consent Plan and Risks Discussed With:  Patient  Discussed plan with:  CRNA        I have informed Tami Noel and/or legal guardian or family member of the nature of the anesthetic plan, benefits, risks including possible dental damage if relevant, major complications, and any alternative forms of anesthetic management.   All of the patient's questions were answered to the best of my ability. The patient desires the anesthetic management as planned.  Adrianna Hunt CRNA  I have reviewed the above note agree with the plan.  Lungs: Clear   Heart: RHRR  Airway: Adequate   ASA III  NPO>Mn  Plan : GA.      Kayli Gross MD.  1/10/2025  11:31 AM  Present on Admission:  **None**                  2025 Hospitalist Progress Note   [facility]  part of [facility]     Hospitalist Progress Note            Tami Noel Patient Status:  Inpatient    2/15/1965 MRN [id number]   Location [facility] 4W/SW/SE Attending Bogdan Pena MD   Hosp Day # 1 PCP RADHA ANDRE         Subjective:  Patient laying in bed, appears comfortable at this time but reports intolerable pain post-op.  She was cleared for discharge by therapy but would prefer to remain in the hospital another day for further care.            Objective:  Review of Systems:   ROS completed; pertinent positive and negatives stated in subjective.        Vital signs:  Temp:  [97.4 °F (36.3 °C)-98.1 °F (36.7 °C)] 98 °F (36.7 °C)  Pulse:  [53-98] 98  Resp:  [10-25] 16  BP: (116-186)/() 116/106  SpO2:  [93 %-99 %] 93 %        Physical Exam:    Gen: NAD AO x3  Chest: good air entry CTABL  CVS: normal s1 and s2 RR  Abd: NABS soft NT ND  Neuro: CN 2-12 grossly intact  Ext: no edema in bilateral LE        Diagnostic Data:    Labs:      Recent Labs   Lab 25  0833   WBC 14.2*   HGB 12.2   MCV 91.3   .0         No results for input(s): \"GLU\", \"BUN\", \"CREATSERUM\", \"GFRAA\", \"GFRNAA\", \"CA\", \"ALB\", \"NA\", \"K\", \"CL\", \"CO2\", \"ALKPHO\", \"AST\", \"ALT\", \"BILT\", \"TP\" in the last 168 hours.     CrCl cannot be calculated (Patient's most recent lab result is older than the maximum 7 days allowed.).     No results for input(s): \"PTP\", \"INR\" in the last 168 hours.           Imaging: Imaging data reviewed in Epic.     Medications:   Scheduled Medications    sennosides  17.2 mg Oral Nightly    docusate sodium  100 mg Oral BID    amLODIPine  2.5 mg Oral Nightly    losartan  50 mg Oral BID    metoprolol succinate ER  50 mg Oral Daily               Assessment & Plan:  Lumbar radiculopathy s/p L4/5 lumbar interbody fusion  Tolerated surgery well  IVF, abx, dvt ppx and pain  meds per surgery team  PT/OT  HTN  BP elevated  Continue home meds - amlodipine, metoprolol, losartan  Monitor vitals        Plan of care discussed with patient or family at bedside.           Supplementary Documentation:  Quality:  DVT Prophylaxis: SCDs  CODE status: Full        Estimated date of discharge: TBD  Discharge is dependent on: clinical stability  At this point Ms. Noel is expected to be discharge to: home     Sergio Hendricks MD  Hospitalist         2025 Hospitalist Progress Note   [facility]  part of [facility]     Hospitalist Progress Note            Tami Noel Patient Status:  Inpatient    2/15/1965 MRN [id number]   Location [facility] 4W/SW/SE Attending Bogdan Pena MD   Hosp Day # 2 PCP RADHA ANDRE         Subjective:  Sitting up in bed, appears comfortable but anxious about going home on her current pain regimen.   Plan to discharge in a.m. if the pain is well controlled.            Objective:  Review of Systems:   ROS completed; pertinent positive and negatives stated in subjective.        Vital signs:  Temp:  [98.2 °F (36.8 °C)-99.8 °F (37.7 °C)] 99.8 °F (37.7 °C)  Pulse:  [78-87] 87  Resp:  [18] 18  BP: (129-156)/(68-80) 129/68  SpO2:  [93 %-97 %] 93 %        Physical Exam:    Gen: NAD AO x3  Chest: good air entry CTABL  CVS: normal s1 and s2 RR  Abd: NABS soft NT ND  Neuro: CN 2-12 grossly intact  Ext: no edema in bilateral LE        Diagnostic Data:    Labs:       Recent Labs   Lab 25  0833 25  0650   WBC 14.2* 16.6*   HGB 12.2 12.8   MCV 91.3 91.8   .0 191.0             Recent Labs   Lab 25  0650   *   BUN 17   CREATSERUM 0.73   CA 8.9   ALB 4.0      K 3.6      CO2 28.0   ALKPHO 56   AST 40*   ALT 11   BILT 0.7   TP 6.7         Estimated Creatinine Clearance: 58.1 mL/min (based on SCr of 0.73 mg/dL).     No results for input(s): \"PTP\", \"INR\" in the last 168 hours.           Imaging: Imaging data  reviewed in Epic.     Medications:   Scheduled Medications    sennosides  17.2 mg Oral Nightly    docusate sodium  100 mg Oral BID    amLODIPine  2.5 mg Oral Nightly    losartan  50 mg Oral BID    metoprolol succinate ER  50 mg Oral Daily               Assessment & Plan:  Lumbar radiculopathy s/p L4/5 lumbar interbody fusion  Tolerated surgery well  IVF, abx, dvt ppx and pain meds per surgery team  PT/OT  Discharge planning  HTN  BP elevated  Continue home meds - amlodipine, metoprolol, losartan  Monitor vitals        Plan of care discussed with patient or family at bedside.           Supplementary Documentation:  Quality:  DVT Prophylaxis: SCDs  CODE status: Full        Estimated date of discharge: TBD  Discharge is dependent on: clinical stability  At this point Ms. Noel is expected to be discharge to: home                       Sergio Hendricks MD  Hospitalist

## 2025-01-13 NOTE — DISCHARGE SUMMARY
[facility]  part of [facility]    DISCHARGE SUMMARY     Tami Noel Patient Status:  Inpatient    2/15/1965 MRN [id number]   Location [facility] 4W/SW/SE Attending Bogdan Pena MD   Hosp Day # 3 PCP RADHA ANDRE     Date of Admission: 1/10/2025  Date of Discharge:  2025    Discharge Disposition: Home or Self Care    Discharge Diagnosis:     Lumbar radiculopathy s/p L4/5 lumbar interbody fusion  HTN    History of Present Illness:     Patient is a 59-year-old  female with chronic back pain and lumbar radiculopathy at level L4-L5 with disc protrusion with right more than left foraminal stenosis on imaging studies, failed outpatient conservative medical management options. Scheduled today for above-mentioned procedure by her orthopedic surgeon, Dr. Pena. Postoperatively, transferred to PACU for further monitoring.     Brief Synopsis:     Lumbar radiculopathy s/p L4/5 lumbar interbody fusion  Tolerated surgery well  IVF, abx, dvt ppx and pain meds per surgery team  PT/OT  HTN  BP elevated  Continue home meds - amlodipine, metoprolol, losartan  Monitor vitals    Patient is to follow up with PCP and Ortho as opt. Discharge meds including instructions for ASA and pain meds ordered by ortho. Patient in good spirits and states she wants to go home and is willing to go home on the regimen currently ordered by ortho.     Patient is to remain compliant with all discharge medications and instructions and to follow up as advised.   Patient encouraged to make healthy lifestyle and dietary changes.    Lace+ Score: 46  59-90 High Risk  29-58 Medium Risk  0-28   Low Risk       TCM Follow-Up Recommendation:  LACE 29-58: Moderate Risk of readmission after discharge from the hospital.      Procedures during hospitalization:   L4/5 interbody fusion    Incidental or significant findings and recommendations (brief descriptions):  None    Lab/Test results pending at Discharge:    None    Consultants:  Ortho    Discharge Medication List:     Discharge Medications        CONTINUE taking these medications        Instructions Prescription details   amLODIPine 2.5 MG Tabs  Commonly known as: Norvasc      Take 1 tablet (2.5 mg total) by mouth nightly.   Refills: 0     aspirin 81 MG Chew      Chew 1 tablet (81 mg total) by mouth daily.   Quantity: 30 tablet  Refills: 1     CALCIUM 1200 OR      Take by mouth 2 (two) times daily.   Refills: 0     Citrucel 500 MG Tabs  Generic drug: Methylcellulose (Laxative)      Take 6 tablets by mouth in the morning and 6 tablets before bedtime.   Refills: 0     losartan 50 MG Tabs  Commonly known as: Cozaar      Take 1 tablet (50 mg total) by mouth 2 (two) times daily.   Refills: 0     metoprolol succinate ER 50 MG Tb24  Commonly known as: Toprol XL      Take 1 tablet (50 mg total) by mouth daily.   Refills: 0     Midol 650 MG Tbcr  Generic drug: Acetaminophen ER      Take 1 tablet (650 mg total) by mouth every 8 (eight) hours as needed for Pain.   Refills: 0     Multi-Vitamin/Minerals Tabs      Take 1 tablet by mouth daily.   Refills: 0              [place]    Follow-up appointment:   Bogdan Pena MD  [address] [facility] [address]  [phone]    Follow up in 2 week(s)      Nikko Leyva  [address] [id number] [phone]    Follow up in 1 week(s)      Appointments for Next 30 Days 1/13/2025 - 2/12/2025      None            Vital signs:  Temp:  [98 °F (36.7 °C)-98.5 °F (36.9 °C)] 98.3 °F (36.8 °C)  Pulse:  [72-77] 77  Resp:  [18] 18  BP: (117-136)/(68-88) 136/88  SpO2:  [93 %-96 %] 96 %    Physical Exam:    Gen: NAD AO x3  Chest: good air entry CTABL  CVS: normal s1 and s2 RR  Abd: NABS soft NT ND  Neuro: CN 2-12 grossly intact  Ext: no edema in bilateral LE    -----------------------------------------------------------------------------------------------  PATIENT DISCHARGE INSTRUCTIONS: See electronic  chart    Sergio Hendricks MD  Hospitalist    Time spent:  > 30 minutes    The 21st Century Cures Act makes medical notes like these available to patients in the interest of transparency. Please be advised this is a medical document. Medical documents are intended to carry relevant information, facts as evident, and the clinical opinion of the practitioner. The medical note is intended as peer to peer communication and may appear blunt or direct. It is written in medical language and may contain abbreviations or verbiage that are unfamiliar.

## 2025-01-13 NOTE — PHYSICAL THERAPY NOTE
PHYSICAL THERAPY TREATMENT NOTE - INPATIENT     Room Number: 437/437-A       Presenting Problem: Lumbar fusion       Problem List  Active Problems:    Lumbar radiculopathy    Primary hypertension    S/P lumbar fusion      PHYSICAL THERAPY ASSESSMENT   Patient demonstrates good  progress this session, goals  remain in progress.      Patient is requiring stand-by assist as a result of the following impairments: decreased functional strength, decreased endurance/aerobic capacity, pain, and decreased muscular endurance.     Patient continues to function near baseline with bed mobility, transfers, gait, stair negotiation, and standing prolonged periods.  Next session anticipate patient to progress bed mobility, transfers, gait, stair negotiation, and standing prolonged periods.  Physical Therapy will continue to follow patient for duration of hospitalization.    Patient continues to benefit from continued skilled PT services: with no additional skilled services but increased support at home.    PLAN DURING HOSPITALIZATION  Nursing Mobility Recommendation : 1 Assist     PT Treatment Plan: Bed mobility;Body mechanics;Coordination;Endurance;Energy conservation;Patient education;Gait training;Strengthening;Stoop training;Stair training;Transfer training;Balance training  Frequency (Obs): Daily     SUBJECTIVE  Pt was agreeable to therapy session.         OBJECTIVE  Precautions: Spine    WEIGHT BEARING RESTRICTION       PAIN ASSESSMENT   Rating:  (did not rate)  Location: back  Management Techniques: Activity promotion;Body mechanics;Relaxation;Repositioning    BALANCE  Static Sitting: Good  Dynamic Sitting: Good  Static Standing: Fair +  Dynamic Standing: Fair    ACTIVITY TOLERANCE                          O2 WALK       AM-PAC '6-Clicks' INPATIENT SHORT FORM - BASIC MOBILITY  How much difficulty does the patient currently have...  Patient Difficulty: Turning over in bed (including adjusting bedclothes, sheets and blankets)?:  A Little   Patient Difficulty: Sitting down on and standing up from a chair with arms (e.g., wheelchair, bedside commode, etc.): A Little   Patient Difficulty: Moving from lying on back to sitting on the side of the bed?: A Little   How much help from another person does the patient currently need...   Help from Another: Moving to and from a bed to a chair (including a wheelchair)?: A Little   Help from Another: Need to walk in hospital room?: A Little   Help from Another: Climbing 3-5 steps with a railing?: A Little     AM-PAC Score:  Raw Score: 18   Approx Degree of Impairment: 46.58%   Standardized Score (AM-PAC Scale): 43.63   CMS Modifier (G-Code): CK    FUNCTIONAL ABILITY STATUS  Functional Mobility/Gait Assessment  Gait Assistance: Supervision  Distance (ft): 400'  Assistive Device: None  Pattern:  (narrow MARIA LUISA)  Stairs: Stairs  How Many Stairs: 4  Device: 1 Rail  Assist: Supervision  Pattern: Ascend and Descend  Ascend and Descend : Step to  Rolling: stand-by assist  Supine to Sit: stand-by assist  Sit to Supine: stand-by assist  Sit to Stand: stand-by assist    Skilled Therapy Provided:  Pt is received in the bed and was cleared for therapy session. Pt reviewed and able to maintain all her spinal precautions throughout the session. Pt is SBA with all mobilities and AMB. Pt AMB about 400' with the no AD SBA for balance and safety. Pt AMB to the therapy gym for stair training. Pt was educated and able to negotiate 4 stairs with 2 HR's SBA for safety. Pt is cued for proper technique and sequencing. Pt with very good balance on the stairs. Returned pt back to the room and back to the bed. Pt is left in the bed with all needs within reach. Pt is on track to dc to home once medically cleared. Reported to the RN on the status of the pt.     The patient's Approx Degree of Impairment: 46.58% has been calculated based on documentation in the [facility] '6 clicks' Inpatient Daily Activity Short Form.  Research supports that  patients with this level of impairment may benefit from Home with assist.  Final disposition will be made by interdisciplinary medical team.        Patient End of Session: In bed;Needs met;Call light within reach;RN aware of session/findings;All patient questions and concerns addressed;Hospital anti-slip socks    CURRENT GOALS   Goals to be met by: 1/15/2025  Patient Goal Patient's self-stated goal is: to go home   Goal #1 Patient is able to demonstrate supine - sit EOB @ level: independent     Goal #1   Current Status SBA    Goal #2 Patient is able to demonstrate transfers EOB to/from BSC at assistance level: independent with none     Goal #2  Current Status SBA with no AD    Goal #3 Patient is able to ambulate  500 feet with assist device: none at assistance level: independent   Goal #3   Current Status 400' with no AD SBA    Goal #4 Patient will negotiate 2 stairs/one curb w/ assistive device and supervision   Goal #4   Current Status 4 stairs with 1 HR SBA    Goal #5 Patient to demonstrate independence with home activity/exercise instructions provided to patient in preparation for discharge.   Goal #5   Current Status IN PROGRESS   Goal #6    Goal #6  Current Status        Therapeutic Activity: 23 minutes

## 2025-01-13 NOTE — PLAN OF CARE
Patient is alert and oriented. Voiding freely. Up independently with walker. PRN oxy, valium and zanaflex given for pain management. Dressings in place to lower back. Call light within reach.   Problem: Patient Centered Care  Goal: Patient preferences are identified and integrated in the patient's plan of care  Description: Interventions:  - Provide timely, complete, and accurate information to patient/family  - Incorporate patient and family knowledge, values, beliefs, and cultural backgrounds into the planning and delivery of care  - Encourage patient/family to participate in care and decision-making at the level they choose  - Honor patient and family perspectives and choices  Outcome: Progressing     Problem: PAIN - ADULT  Goal: Verbalizes/displays adequate comfort level or patient's stated pain goal  Description: INTERVENTIONS:  - Encourage pt to monitor pain and request assistance  - Assess pain using appropriate pain scale  - Administer analgesics based on type and severity of pain and evaluate response  - Implement non-pharmacological measures as appropriate and evaluate response  - Consider cultural and social influences on pain and pain management  - Manage/alleviate anxiety  - Utilize distraction and/or relaxation techniques  - Monitor for opioid side effects  - Notify MD/LIP if interventions unsuccessful or patient reports new pain  - Anticipate increased pain with activity and pre-medicate as appropriate  Outcome: Progressing     Problem: RISK FOR INFECTION - ADULT  Goal: Absence of fever/infection during anticipated neutropenic period  Description: INTERVENTIONS  - Monitor WBC  - Administer growth factors as ordered  - Implement neutropenic guidelines  Outcome: Progressing     Problem: SAFETY ADULT - FALL  Goal: Free from fall injury  Description: INTERVENTIONS:  - Assess pt frequently for physical needs  - Identify cognitive and physical deficits and behaviors that affect risk of falls.  - [facility]  fall precautions as indicated by assessment.  - Educate pt/family on patient safety including physical limitations  - Instruct pt to call for assistance with activity based on assessment  - Modify environment to reduce risk of injury  - Provide assistive devices as appropriate  - Consider OT/PT consult to assist with strengthening/mobility  - Encourage toileting schedule  Outcome: Progressing     Problem: DISCHARGE PLANNING  Goal: Discharge to home or other facility with appropriate resources  Description: INTERVENTIONS:  - Identify barriers to discharge w/pt and caregiver  - Include patient/family/discharge partner in discharge planning  - Arrange for needed discharge resources and transportation as appropriate  - Identify discharge learning needs (meds, wound care, etc)  - Arrange for interpreters to assist at discharge as needed  - Consider post-discharge preferences of patient/family/discharge partner  - Complete POLST form as appropriate  - Assess patient's ability to be responsible for managing their own health  - Refer to Case Management Department for coordinating discharge planning if the patient needs post-hospital services based on physician/LIP order or complex needs related to functional status, cognitive ability or social support system  Outcome: Progressing

## 2025-01-14 NOTE — PAYOR COMM NOTE
--------------  DISCHARGE REVIEW    Payor: KATELYN CURRIE  Subscriber #:  [id number]  Authorization Number: [id number]    Admit date: 1/10/25  Admit time:   8:41 AM  Discharge Date: 2025  6:37 PM     Admitting Physician: Bogdan Pena MD  Attending Physician:  No att. providers found  Primary Care Physician: Radha Andre          Discharge Summary Notes        Discharge Summary signed by Sergio Hendricks MD at 2025 10:31 AM       Author: Sergio Hendricks MD Specialty: HOSPITALIST, Internal Medicine Author Type: Physician    Filed: 2025 10:31 AM Date of Service: 2025 10:30 AM Status: Signed    : Sergio Hendricks MD (Physician)           [facility]  part of [facility]    DISCHARGE SUMMARY     Tami Noel Patient Status:  Inpatient    2/15/1965 MRN [id number]   Location [facility] 4W//SE Attending Bogdan Pena MD   Hosp Day # 3 PCP RADHA ANDRE     Date of Admission: 1/10/2025  Date of Discharge:  2025    Discharge Disposition: Home or Self Care    Discharge Diagnosis:     Lumbar radiculopathy s/p L4/5 lumbar interbody fusion  HTN    History of Present Illness:     Patient is a 59-year-old  female with chronic back pain and lumbar radiculopathy at level L4-L5 with disc protrusion with right more than left foraminal stenosis on imaging studies, failed outpatient conservative medical management options. Scheduled today for above-mentioned procedure by her orthopedic surgeon, Dr. Pena. Postoperatively, transferred to PACU for further monitoring.     Brief Synopsis:     Lumbar radiculopathy s/p L4/5 lumbar interbody fusion  Tolerated surgery well  IVF, abx, dvt ppx and pain meds per surgery team  PT/OT  HTN  BP elevated  Continue home meds - amlodipine, metoprolol, losartan  Monitor vitals    Patient is to follow up with PCP and Ortho as opt. Discharge meds including instructions for ASA and pain meds ordered by ortho. Patient in good spirits  and states she wants to go home and is willing to go home on the regimen currently ordered by ortho.     Patient is to remain compliant with all discharge medications and instructions and to follow up as advised.   Patient encouraged to make healthy lifestyle and dietary changes.    Lace+ Score: 46  59-90 High Risk  29-58 Medium Risk  0-28   Low Risk       TCM Follow-Up Recommendation:  LACE 29-58: Moderate Risk of readmission after discharge from the hospital.      Procedures during hospitalization:   L4/5 interbody fusion    Incidental or significant findings and recommendations (brief descriptions):  None    Lab/Test results pending at Discharge:   None    Consultants:  Ortho    Discharge Medication List:     Discharge Medications        CONTINUE taking these medications        Instructions Prescription details   amLODIPine 2.5 MG Tabs  Commonly known as: Norvasc      Take 1 tablet (2.5 mg total) by mouth nightly.   Refills: 0     aspirin 81 MG Chew      Chew 1 tablet (81 mg total) by mouth daily.   Quantity: 30 tablet  Refills: 1     CALCIUM 1200 OR      Take by mouth 2 (two) times daily.   Refills: 0     Citrucel 500 MG Tabs  Generic drug: Methylcellulose (Laxative)      Take 6 tablets by mouth in the morning and 6 tablets before bedtime.   Refills: 0     losartan 50 MG Tabs  Commonly known as: Cozaar      Take 1 tablet (50 mg total) by mouth 2 (two) times daily.   Refills: 0     metoprolol succinate ER 50 MG Tb24  Commonly known as: Toprol XL      Take 1 tablet (50 mg total) by mouth daily.   Refills: 0     Midol 650 MG Tbcr  Generic drug: Acetaminophen ER      Take 1 tablet (650 mg total) by mouth every 8 (eight) hours as needed for Pain.   Refills: 0     Multi-Vitamin/Minerals Tabs      Take 1 tablet by mouth daily.   Refills: 0              [place]    Follow-up appointment:   Bogdan Pena MD  [address] [facility] [address] [phone]    Follow up in 2 week(s)      Gordon  Nikko  [address]  [address]  [place] [address]  [phone]    Follow up in 1 week(s)      Appointments for Next 30 Days 1/13/2025 - 2/12/2025      None            Vital signs:  Temp:  [98 °F (36.7 °C)-98.5 °F (36.9 °C)] 98.3 °F (36.8 °C)  Pulse:  [72-77] 77  Resp:  [18] 18  BP: (117-136)/(68-88) 136/88  SpO2:  [93 %-96 %] 96 %    Physical Exam:    Gen: NAD AO x3  Chest: good air entry CTABL  CVS: normal s1 and s2 RR  Abd: NABS soft NT ND  Neuro: CN 2-12 grossly intact  Ext: no edema in bilateral LE    -----------------------------------------------------------------------------------------------  PATIENT DISCHARGE INSTRUCTIONS: See electronic chart    Sergio Hendricks MD  Hospitalist    Time spent:  > 30 minutes    The 21st Century Cures Act makes medical notes like these available to patients in the interest of transparency. Please be advised this is a medical document. Medical documents are intended to carry relevant information, facts as evident, and the clinical opinion of the practitioner. The medical note is intended as peer to peer communication and may appear blunt or direct. It is written in medical language and may contain abbreviations or verbiage that are unfamiliar.     Electronically signed by Sergio Hendricks MD on 1/13/2025 10:31 AM         REVIEWER COMMENTS

## 2025-01-16 NOTE — OPERATIVE REPORT
PATIENT  Tami Noel    DATE OF SURGERY  1/10/2025    SURGEON  Bogdan Pena MD    ASSISTANT  OK Garces    PREOPERATIVE DIAGNOSES    1. Degenerative Spondylolisthesis, grade 1, L4-5  2. L4-5 lumbar stenosis  3.  Lumbar radiculopathy    POSTOPERATIVE DIAGNOSES    Same    PROCEDURE    1. Transforaminal lumbar interbody fusion, L4-5, via a left sided approach  2. Biomechanical strut device via application of L4-5 interbody cage device   3. L4-5 Laminectomy,for decompression under direct visualization  4. Posterior spinal instrumentation with bilateral pedicle screws at L4-5  5. Application of local auto graft and allograft  6. Use of microscope  7. Use of flurosocopy    DESCRIPTION OF PROCEDURE       ESTIMATED BLOOD LOSS    25 mL.     DRAINS    None.     COMPLICATIONS    None    IMPLANTS  Nuvasive RELINE  Pedicle screws, 6.5 x 45mm screws x 4  Nuvasive large cage 11 mm; 5 deg lordosis  Rods: 40mm x 2    DISPOSITION     Stable to PACU    INDICATIONS    The patient is a 59-year-old female with lower extremity radiculopathy and severe low back pain. XR and MRI evidence of lumbar spinal stenosis and spondylolisthesis at L4-5. The patient failed a course of non-operative management including physical therapy and epidural injections. Risks and benefits of the procedure were discussed with the patient which included but was not limited to bleeding, infection, nerve injury, epidural hematoma, durotomy as well as complications associated with general anesthesia (stroke, myocardial infarction, DVT, PE).    OPERATIVE FINDINGS    Degenerative spondylolisthesis, grade 1, with lumbar stenosis at L5-S1.    TECHNIQUE    The patient was met in the pre operative holding area. The consent was reviewed and the patient was marked at the operative site. MARKO hose stockings were applied as were SCDs. The patient was then taken to the operating room, and 2 g of Ancef was given preoperatively. Following successful general endotracheal  intubation, the patient was placed into the prone position on the Nico table.   The patient was prepped and draped in the usual sterile fashion. A WHO timeout was completed to confirm the patient, operative site, procedure, and antibiotics.  We began by obtaining simultaneous perfect AP and lateral views using 2 C-arms. We marked the lateral edges of the cephaled pedicles. We injected 60cc of 0.25% bupivacaine bilaterally. We made a stab incision in this region. We placed Jamshidi at the 3 o'clock position of the pedicle on the AP and center on the lateral. We advanced the Jamshidi into the posterior aspect of the vertebral body under biplanar fluoroscopy. We then placed a nitinol wire through the Jamshidi. Next, we used an identical procedure through a stab incision to place a K-wires in the contralateral and then caudal pedicles.     After placing our wires, we were prepared to proceed with the decompression portion of the case. The fascia was opened on the right side using Bovie electraucautery. We placed the 1st dilator of the XLIF set set down at the 5 o'clock position on the L4 lamina. We serially dilated up to a size 18 mm tube and securely fastened this to the table under fluoroscopy. The dilater was opened to a size 22mm.  We introduced a microscope. Bovie electrocautery was used to remove extraneous soft tissue. The lamina and facet joint were thoroughly exposed. We used a high-speed clarke. Burring all of the way down to the level of the ligamentum flavum. We then entered the ligamentum flavum with a micro straight curette. The ligamentum flavum was resected using a series of Kerrison punches. We identified the inferior articular process and we burred out through this laterally. We thinned the superior articular process with a clarke and completed its resection using a Kerrison rongeur. This gave us an excellent view of the traversing nerve root, which was completely free. We used bipolar electrocautery to  obtain hemostasis and coagulate epidural veins.  The retractor was then angled medially and a contralateral laminectomy and foraminotomy was performed using bur and Kerrison rongeurs. Good decompression of the full width of the thecal sac as well as the contralateral nerve roots was accomplished.     We next identified the disk space. We performed an annulotomy with a knife. We then performed our discectomy using a series of pituitary rongeurs and francis. We then performed a thorough endplate prep using a straight and curved curettes and a down pushing curette. After a thorough disk plate prep which was directly visualized, we insterted a funnel, which has been packed with bone graft that we saved from our earlier laminectomy as well as allograft bone. This was inserted into the anterior disk space. We then manually packed bone graft into the disk space. We then created a path for our cage to follow using an impactor. We then inserted the cage, which has been packed with a combination of autograft and allograft. We confirmed the position of the cage on biplanar fluoroscopic view.    Next we performed our instrumentation. Using the earlier placed k-wires in a cannulated tap under directly stimulated EMG, we tapped and then inserted our pedicle screws bilaterally. Then pedicle screws were also placed under directly stimulated EMG. The rods were then measured using a caliper through the sleeves and the appropriate size rods were inserted through these sleeves. Set screws were final tightened.  We thoroughly irrigated the incisions. Hemostasis was achieved using FloSeal and a packing sponge. All sponge and needle counts were correct prior to closure. We had no sustained EMG activity throughout the case. The triggered EMG responses were all greater than 10mA. We closed the fascial layer with a #1 vicryl, deep dermal layer with a 2-0 vicryl, skin with a running 4-0 Monocryl. Then placed Surgiseal. A sterile dressing was  applied.    The patient was transitioned to the supine position on her hospital bed and extubated without difficulty. They were noted to be moving their bilateral lower extremities vigorously on command. The patient was then transferred to the PACU in stable condition.

## 2025-02-09 ENCOUNTER — OFFICE VISIT (OUTPATIENT)
Dept: FAMILY MEDICINE CLINIC | Facility: CLINIC | Age: 60
End: 2025-02-09
Payer: COMMERCIAL

## 2025-02-09 VITALS
OXYGEN SATURATION: 98 % | SYSTOLIC BLOOD PRESSURE: 126 MMHG | BODY MASS INDEX: 23.36 KG/M2 | HEART RATE: 67 BPM | RESPIRATION RATE: 18 BRPM | DIASTOLIC BLOOD PRESSURE: 80 MMHG | TEMPERATURE: 98 F | HEIGHT: 60 IN | WEIGHT: 119 LBS

## 2025-02-09 DIAGNOSIS — R22.0 FACIAL MASS: Primary | ICD-10-CM

## 2025-02-09 DIAGNOSIS — R19.7 DIARRHEA, UNSPECIFIED TYPE: ICD-10-CM

## 2025-02-09 PROCEDURE — 99213 OFFICE O/P EST LOW 20 MIN: CPT | Performed by: NURSE PRACTITIONER

## 2025-02-09 RX ORDER — DOCUSATE SODIUM 50MG AND SENNOSIDES 8.6MG 8.6; 5 MG/1; MG/1
TABLET, FILM COATED ORAL
COMMUNITY
Start: 2024-12-27

## 2025-02-09 RX ORDER — OXYCODONE HYDROCHLORIDE 10 MG/1
TABLET ORAL
COMMUNITY
Start: 2025-01-13

## 2025-02-09 NOTE — PROGRESS NOTES
Subjective:   Patient ID: Tami Noel is a 59 year old female.    Patient presents with  for C/O left jaw lump. First noticed symptoms yesterday and feels larger today. Mild pain. Nothing makes it worse or better. Denies fever, chills, nasal congestion, cough, headache, sore throat, dental pain, or facial swelling. No history of teeth grinding or TMJ.    Reports intermittent diarrhea 3-4 episodes loose stools/day for 1 week. No blood, black or mucus in stool. No diarrhea today. Imodium with relief. Denies constipation, diarrhea, nausea, vomiting, decreased appetite, pain with meals or abdominal pain. Denies antibiotics or new medications. She is not taking oxycodone pain medication. Taking Midol and Tylenol for pain from recent spinal fusion surgery on 1/10. Denies incontinence of bowel or bladder.        History/Other:   Review of Systems   Constitutional: Negative.    HENT:  Negative for congestion, dental problem, ear discharge, ear pain, facial swelling, mouth sores, rhinorrhea, sore throat, trouble swallowing and voice change.    Eyes: Negative.    Respiratory:  Negative for cough.    Cardiovascular:  Negative for chest pain, palpitations and leg swelling.   Gastrointestinal:  Positive for diarrhea. Negative for abdominal distention, abdominal pain, blood in stool, constipation, nausea and vomiting.   Genitourinary:  Negative for difficulty urinating, dysuria, flank pain, frequency, hematuria and urgency.   Musculoskeletal:  Negative for neck pain and neck stiffness.   Skin: Negative.      Current Outpatient Medications   Medication Sig Dispense Refill   • SENEXON-S 8.6-50 MG Oral Tab TAKE 1 TABLET TWICE DAILY BY ORAL ROUTE AS NEEDED FOR POST OPERATIVE CONSTIPATION     • amLODIPine 2.5 MG Oral Tab Take 1 tablet (2.5 mg total) by mouth nightly.     • metoprolol succinate ER 50 MG Oral Tablet 24 Hr Take 1 tablet (50 mg total) by mouth daily.     • Multiple Vitamins-Minerals (MULTI-VITAMIN/MINERALS)  Oral Tab Take 1 tablet by mouth daily.     • Acetaminophen ER (MIDOL) 650 MG Oral Tab CR Take 1 tablet (650 mg total) by mouth every 8 (eight) hours as needed for Pain.     • CALCIUM 1200 OR Take by mouth 2 (two) times daily.     • losartan 50 MG Oral Tab Take 1 tablet (50 mg total) by mouth 2 (two) times daily.     • tiZANidine 4 MG Oral Tab TAKE 1 TABLET(S) EVERY 6 TO 8 HOURS BY ORAL ROUTE AS NEEDED FOR POST OP MUSCLE SPASMS (Patient not taking: Reported on 2/9/2025)     • oxyCODONE 10 MG Oral Tab TAKE 1 TABLET EVERY 4 HOURS BY ORAL ROUTE AS NEEDED, FOR FOR SEVERE PAIN. (Patient not taking: Reported on 2/9/2025)     • Methylcellulose, Laxative, (CITRUCEL) 500 MG Oral Tab Take 6 tablets by mouth in the morning and 6 tablets before bedtime. (Patient not taking: Reported on 2/9/2025)     • aspirin 81 MG Oral Chew Tab Chew 1 tablet (81 mg total) by mouth daily. (Patient not taking: Reported on 2/9/2025) 30 tablet 1     Allergies:Allergies[1]    Objective:   Vitals:    02/09/25 1413   BP: 126/80   Pulse: 67   Resp: 18   Temp: 97.6 °F (36.4 °C)      Physical Exam  Constitutional:       Appearance: Normal appearance.   HENT:      Head: Normocephalic and atraumatic.      Jaw: Pain on movement present. No trismus, swelling or malocclusion.      Salivary Glands: Right salivary gland is not diffusely enlarged or tender. Left salivary gland is not diffusely enlarged or tender.      Comments: Patient reports mild discomfort to left jaw when opening mouth. No parotid gland swelling or mass.   Left face just above submandibular gland with oval mass or cyst palpated (approx 1 cm in diameter). No fluctuance, non-movable, mild TTP. No facial swelling or rash. No regional lymphadenopathy.      Right Ear: Tympanic membrane and ear canal normal.      Left Ear: Tympanic membrane and ear canal normal.      Nose: Nose normal.      Mouth/Throat:      Mouth: Mucous membranes are moist. No injury or oral lesions.      Dentition: No dental  tenderness, gingival swelling, dental caries, dental abscesses or gum lesions.      Tongue: No lesions.      Pharynx: Oropharynx is clear. No oropharyngeal exudate or posterior oropharyngeal erythema.      Tonsils: No tonsillar exudate.   Eyes:      Extraocular Movements: Extraocular movements intact.      Conjunctiva/sclera: Conjunctivae normal.      Pupils: Pupils are equal, round, and reactive to light.   Cardiovascular:      Rate and Rhythm: Normal rate and regular rhythm.   Pulmonary:      Effort: Pulmonary effort is normal.      Breath sounds: Normal breath sounds.   Abdominal:      General: Abdomen is flat. Bowel sounds are normal.      Palpations: Abdomen is soft.      Tenderness: There is generalized abdominal tenderness and tenderness in the epigastric area. There is no guarding.   Lymphadenopathy:      Head:      Left side of head: No submental, submandibular, tonsillar, preauricular, posterior auricular or occipital adenopathy.      Cervical: No cervical adenopathy.      Upper Body:      Right upper body: No supraclavicular adenopathy.      Left upper body: No supraclavicular adenopathy.   Skin:     General: Skin is warm and dry.      Findings: No rash.      Comments: Surgery scars to bilateral lower back. Wound healed. No erythema, edema, drainage or TTP.    Neurological:      General: No focal deficit present.      Mental Status: She is alert.   Psychiatric:         Mood and Affect: Mood normal.         Behavior: Behavior normal.     Assessment & Plan:   1. Facial mass    2. Diarrhea, unspecified type      Discussed limitations of [facility]. Does not appear to be parotitis. Mass is well defined. No erythema or facial swelling.   Discussed lymph node, cysts, nodule and mass.  Instructed to make appointment with PCP for further testing and eval of facial mass as well as stool collection for diarrhea if does not continue to improve. Patient reports she has appointment with dr. Leyva tomorrow. Patient feels  well otherwise and agreeable.   No further questions or concerns.        [1]   Allergies  Allergen Reactions   • Asa [Aspirin] OTHER (SEE COMMENTS)     Hx ITP   • Sulfa Antibiotics HIVES     Other reaction(s): Rash

## 2025-02-18 ENCOUNTER — OFFICE VISIT (OUTPATIENT)
Dept: OBGYN CLINIC | Facility: CLINIC | Age: 60
End: 2025-02-18

## 2025-02-18 VITALS
SYSTOLIC BLOOD PRESSURE: 141 MMHG | WEIGHT: 119.63 LBS | DIASTOLIC BLOOD PRESSURE: 83 MMHG | BODY MASS INDEX: 23 KG/M2 | HEART RATE: 76 BPM

## 2025-02-18 DIAGNOSIS — Z12.31 SCREENING MAMMOGRAM FOR BREAST CANCER: ICD-10-CM

## 2025-02-18 DIAGNOSIS — Z01.419 ENCOUNTER FOR GYNECOLOGICAL EXAMINATION WITHOUT ABNORMAL FINDING: Primary | ICD-10-CM

## 2025-02-18 PROCEDURE — 99396 PREV VISIT EST AGE 40-64: CPT | Performed by: OBSTETRICS & GYNECOLOGY

## 2025-02-23 PROBLEM — R10.2 PELVIC PAIN: Status: RESOLVED | Noted: 2022-07-22 | Resolved: 2025-02-23

## 2025-02-24 NOTE — PROGRESS NOTES
Subjective:   Patient ID: Tami Noel is a 60 year old female.    HPI   and . No new medical issues but she did have L5-S1 fusion by Dr Pena.     History/Other:   Review of Systems   Constitutional:  Negative for appetite change, fatigue and unexpected weight change.   Eyes:  Negative for visual disturbance.   Respiratory:  Negative for cough and shortness of breath.    Cardiovascular:  Negative for chest pain, palpitations and leg swelling.   Gastrointestinal:  Negative for abdominal distention, abdominal pain, blood in stool, constipation and diarrhea.   Genitourinary:  Negative for dyspareunia, dysuria, frequency, pelvic pain and urgency.   Musculoskeletal:  Negative for arthralgias and myalgias.   Skin:  Negative for rash.   Neurological:  Negative for weakness, numbness and headaches.   Psychiatric/Behavioral:  Negative for dysphoric mood. The patient is not nervous/anxious.      Current Outpatient Medications   Medication Sig Dispense Refill    tiZANidine 4 MG Oral Tab       Methylcellulose, Laxative, (CITRUCEL) 500 MG Oral Tab Take 6 tablets by mouth in the morning and 6 tablets before bedtime.      amLODIPine 2.5 MG Oral Tab Take 1 tablet (2.5 mg total) by mouth nightly.      metoprolol succinate ER 50 MG Oral Tablet 24 Hr Take 1 tablet (50 mg total) by mouth daily.      Multiple Vitamins-Minerals (MULTI-VITAMIN/MINERALS) Oral Tab Take 1 tablet by mouth daily.      Acetaminophen ER (MIDOL) 650 MG Oral Tab CR Take 1 tablet (650 mg total) by mouth every 8 (eight) hours as needed for Pain.      CALCIUM 1200 OR Take by mouth 2 (two) times daily.      losartan 50 MG Oral Tab Take 1 tablet (50 mg total) by mouth 2 (two) times daily.      aspirin 81 MG Oral Chew Tab Chew 1 tablet (81 mg total) by mouth daily. 30 tablet 1    SENEXON-S 8.6-50 MG Oral Tab TAKE 1 TABLET TWICE DAILY BY ORAL ROUTE AS NEEDED FOR POST OPERATIVE CONSTIPATION       Allergies:Allergies[1]    Objective:   Physical  Exam  Constitutional:       General: She is not in acute distress.     Appearance: She is well-developed. She is not diaphoretic.   Neck:      Thyroid: No thyromegaly.   Cardiovascular:      Rate and Rhythm: Normal rate and regular rhythm.      Heart sounds: Normal heart sounds. No murmur heard.  Pulmonary:      Effort: Pulmonary effort is normal.      Breath sounds: Normal breath sounds. No wheezing or rales.   Chest:   Breasts:     Right: Normal. No mass, nipple discharge, skin change or tenderness.      Left: Normal. No mass, nipple discharge, skin change or tenderness.      Comments:     Abdominal:      General: There is no distension.      Palpations: Abdomen is soft. There is no mass.      Tenderness: There is no abdominal tenderness. There is no guarding or rebound.   Genitourinary:     Labia:         Right: No rash or lesion.         Left: No rash or lesion.       Vagina: Normal. No vaginal discharge.      Cervix: No cervical motion tenderness or discharge.      Uterus: Not enlarged and not tender.       Adnexa:         Right: No mass, tenderness or fullness.          Left: No mass, tenderness or fullness.     Musculoskeletal:         General: No tenderness.      Cervical back: Neck supple.   Lymphadenopathy:      Cervical: No cervical adenopathy.      Upper Body:      Right upper body: No supraclavicular, axillary or pectoral adenopathy.      Left upper body: No supraclavicular, axillary or pectoral adenopathy.   Neurological:      Mental Status: She is alert.         Assessment & Plan:   1. Encounter for gynecological examination without abnormal finding    2. Screening mammogram for breast cancer            Meds This Visit:  Requested Prescriptions      No prescriptions requested or ordered in this encounter       Imaging & Referrals:  [facility] DEBBIE 2D+3D SCREENING BILAT (CPT=77067/[id number])         [1]   Allergies  Allergen Reactions    Asa [Aspirin] OTHER (SEE COMMENTS)     Hx ITP    Sulfa Antibiotics HIVES      Other reaction(s): Rash

## 2025-05-21 ENCOUNTER — TELEPHONE (OUTPATIENT)
Dept: OBGYN CLINIC | Facility: CLINIC | Age: 60
End: 2025-05-21

## 2025-05-21 DIAGNOSIS — R10.2 ACUTE PELVIC PAIN, FEMALE: Primary | ICD-10-CM

## 2025-05-21 NOTE — TELEPHONE ENCOUNTER
Patient is still  having pelvic pain ,   patient did have spinal surgery ,  patient said Pelvic pain is worse since she did  Physical therapy

## 2025-05-21 NOTE — TELEPHONE ENCOUNTER
Patient calling states she woke up at 2 AM this morning with pelvic pain stating 7/10 patient took Midol which helped a little. Patient states pelvic pain is on right side in front. Patient states it feels like period cramping. Patient is postmenopausal.   Patient had seen Dr. Doe 2/18/25 for this pain however she states it has not been this painful before.Patient has tried position changes and some P/T exercises with little help. Patient wondering if she needs an ultrasound.    Information to Dr. Doe on call

## 2025-05-27 ENCOUNTER — HOSPITAL ENCOUNTER (OUTPATIENT)
Dept: ULTRASOUND IMAGING | Age: 60
Discharge: HOME | End: 2025-05-27
Attending: OBSTETRICS & GYNECOLOGY
Payer: COMMERCIAL

## 2025-05-27 DIAGNOSIS — R10.2 ACUTE PELVIC PAIN, FEMALE: ICD-10-CM

## 2025-05-27 PROCEDURE — 76830 TRANSVAGINAL US NON-OB: CPT | Performed by: OBSTETRICS & GYNECOLOGY

## 2025-05-27 PROCEDURE — 76856 US EXAM PELVIC COMPLETE: CPT | Performed by: OBSTETRICS & GYNECOLOGY

## (undated) DEVICE — KIT HEMSTAT MTRX 8ML PORCINE GEL HUM THROM

## (undated) DEVICE — GLOVE SUR 6.5 SENSICARE PI MIC PIP CRM PWD F

## (undated) DEVICE — GOWN SURG L DISP LEV 3 AERO BLU RAGLAN SL ST

## (undated) DEVICE — 3.0MM PRECISION NEURO (MATCH HEAD)

## (undated) DEVICE — SUTURE VCRL SZ 2-0 L27IN ABSRB UD L26MM CT-2

## (undated) DEVICE — ADHESIVE SKIN TOP FOR WND CLSR DERMBND ADV

## (undated) DEVICE — ANTIBACTERIAL UNDYED BRAIDED (POLYGLACTIN 910), SYNTHETIC ABSORBABLE SUTURE: Brand: COATED VICRYL

## (undated) DEVICE — ELECTRODE ES L16.5CM BLDE MPLR OPN APPRCH EZ

## (undated) DEVICE — 3M™ TEGADERM™ TRANSPARENT FILM DRESSING, 1626W, 4 IN X 4-3/4 IN (10 CM X 12 CM), 50 EACH/CARTON, 4 CARTON/CASE: Brand: 3M™ TEGADERM™

## (undated) DEVICE — 3M™ STERI-DRAPE™ U-DRAPE 1015: Brand: STERI-DRAPE™

## (undated) DEVICE — SUT COAT VCRL + 2-0 27IN ABSRB UD ANTIBACT CT-1

## (undated) DEVICE — C-ARMOR C-ARM EQUIPMENT COVERS CLEAR STERILE UNIVERSAL FIT 12 PER CASE: Brand: C-ARMOR

## (undated) DEVICE — GAMMEX® PI HYBRID SIZE 8.5, STERILE POWDER-FREE SURGICAL GLOVE, POLYISOPRENE AND NEOPRENE BLEND: Brand: GAMMEX

## (undated) DEVICE — SUT COAT VCRL+ 0 27IN UR-6 ABSRB VLT ANTIBACT

## (undated) DEVICE — GAUZE,SPONGE,4"X4",12PLY,WOVEN,NS,LF: Brand: MEDLINE

## (undated) DEVICE — SYSTEM DEL GRFT MOD MAS

## (undated) DEVICE — COVER,TABLE,60X90,STERILE: Brand: MEDLINE

## (undated) DEVICE — WRAP COOLING BACK W/NO PILLOW

## (undated) DEVICE — PACK CDS LAMINECTOMY

## (undated) DEVICE — SUT COAT VCRL 0 27IN CT-1 ABSRB VLT 36MM 1/2

## (undated) DEVICE — 3M™ TEGADERM™ TRANSPARENT FILM DRESSING FRAME STYLE, 1626W, 4 IN X 4-3/4 IN (10 CM X 12 CM), 50/CT 4CT/CASE: Brand: 3M™ TEGADERM™

## (undated) DEVICE — PAD N ADH 3X4IN COT POLY SFT PERF FLM

## (undated) DEVICE — KIT SUR ACCS MAXCESS

## (undated) DEVICE — GLOVE SUR 7 SENSICARE PI MIC PIP CRM PWD F

## (undated) DEVICE — GAUZE,SPONGE,4"X4",16PLY,XRAY,STRL,LF: Brand: MEDLINE

## (undated) DEVICE — GLOVE SUR 6.5 SENSICARE PI PIP GRN PWD F

## (undated) DEVICE — NON-ADHERENT DRESSING: Brand: TELFA

## (undated) DEVICE — INSULATED BLADE ELECTRODE;CAUTION: FOR MANUFACTURING, PROCESSING, OR REPACKING.: Brand: EDGE

## (undated) DEVICE — INSULATED BLADE ELECTRODE: Brand: EDGE

## (undated) DEVICE — SUTURE VCRL SZ 0 L27IN ABSRB VLT L26MM UR-6

## (undated) DEVICE — GOWN,SIRUS,FAB REINF,RAGLAN,L,STERILE: Brand: MEDLINE

## (undated) DEVICE — TUBING MEGADYNE SPECULUM

## (undated) DEVICE — PENCIL ES BTTN SWCH W/ TIP HOLSTER E-Z CLN

## (undated) DEVICE — SHEET,DRAPE,53X77,STERILE: Brand: MEDLINE

## (undated) DEVICE — SUTURE MCRYL SZ 3-0 L18IN ABSRB UD L19MM PS-2

## (undated) DEVICE — GLOVE SUR 8.5 SENSICARE PI MIC PIP CRM PWD F

## (undated) DEVICE — CONTAINER,SPECIMEN,OR STERILE,4OZ: Brand: MEDLINE

## (undated) DEVICE — SUTURE VCRL SZ 3-0 L27IN ABSRB UD L26MM SH

## (undated) DEVICE — UNDYED BRAIDED (POLYGLACTIN 910), SYNTHETIC ABSORBABLE SUTURE: Brand: COATED VICRYL

## (undated) DEVICE — MONITORING NEUROPHYSIOLOGICAL

## (undated) DEVICE — INTENDED USE FOR SURGICAL MARKING ON INTACT SKIN, ALSO PROVIDES A PERMANENT METHOD OF IDENTIFYING OBJECTS IN THE OPERATING ROOM: Brand: WRITESITE® PLUS MINI PREP RESISTANT MARKER

## (undated) DEVICE — TRAY INSTR PWR NUVASIVE

## (undated) DEVICE — GLOVE SUR 7 SENSICARE PI PIP GRN PWD F

## (undated) DEVICE — SUT MCRYL 3-0 18IN PS-2 ABSRB UD 19MM 3/8 CIR

## (undated) DEVICE — NEEDLE NRV STIM BVL TIP INSUL PEDCL ACCS SYS

## (undated) DEVICE — SPONGE GZ 4XL4IN 100% COT 12 PLY TYP VII WVN

## (undated) DEVICE — SPK10329 JACKSON KIT: Brand: SPK10329 JACKSON KIT

## (undated) DEVICE — LAMINECTOMY: Brand: MEDLINE INDUSTRIES, INC.

## (undated) NOTE — LETTER
Date & Time: 12/9/2019, 8:32 AM  Patient: Tash Matt  Encounter Provider(s):    Shania Wong MD       To Whom It May Concern:    Shar Li was seen and treated in our department on 12/9/2019.  She should not return to work until 12/16/20

## (undated) NOTE — MR AVS SNAPSHOT
[facility]  [address], [address]  [phone]               Thank you for choosing us for your health care visit with Chayo Doe DO.   We are glad to serve you and happy to provide you with this summa Medical Issues Discussed Today     Encounter for gynecological examination without abnormal finding    -  Primary    Screening mammogram, encounter for        Screening for malignant neoplasm of cervix          Instructions and Information about Your Healt Specimen: ThinPrep Imager Screening Pap, Cervical/endocervical                                       Interpretation/Result       Negative for intraepithelial lesion or malignancy    Specimen Adequacy Satisfactory for evaluation. Screened by the [address] [place] [address] and a cytotechnologist.\plain\f1\fs22\joyv2387\hich\f1\dbch\f1\loch\f1\fs22\par}}           Procedure {\rtf1\qociel05390\ansi\cwcramu2420\ftnbj\uc1\deff0{\fonttbl{\f0 \fswiss MS Sans Serif;}{\f1 \fswiss \fcharset0 MS {\rtf1\ucwpoc21742\ansi\slgpbpp0316\ftnbj\uc1\deff0{\fonttbl{\f0 \fswiss MS Sans Serif;}{\f1 \fswiss \fcharset0 MS Sans Serif;}}{\colortbl ;\nor711\eycbn047\tjmz726 ;\red0\green0\blue0 ;}{\stylesheet{\f0\fs20 Normal;}{\cs1 Default Paragraph Font;}}{\*\revt

## (undated) NOTE — LETTER
10/1/2020              Tami Madrid [address] [place] [address].        [place] [facility] [address]         Dear Brennan Cannon,      It was a pleasure to see you at our [address] [place] [address] office.  You have a normal mammogram. Rep

## (undated) NOTE — ED AVS SNAPSHOT
Jeaneth Mendez   MRN: [id number]    Department:  [facility] Emergency Department   Date of Visit:  11/9/2017           Disclosure     Insurance plans vary and the physician(s) referred by the ER may not be covered by your plan.  Please contact CARE PHYSICIAN AT ONCE OR RETURN IMMEDIATELY TO THE EMERGENCY DEPARTMENT. If you have been prescribed any medication(s), please fill your prescription right away and begin taking the medication(s) as directed.   If you believe that any of the medications

## (undated) NOTE — LETTER
9/25/2020              Tami [address] [facility]        [place] [address]         Dear Crystal Seo,      It was a pleasure to see you at our [address] [facility] office.  You have negative pap and

## (undated) NOTE — MR AVS SNAPSHOT
After Visit Summary   9/17/2020    Miguel Angel Prado    MRN: [id number]           Visit Information     Date & Time  9/17/2020 10:00 AM Provider  Cassius Bourne DO Department  [facility], [facility], [address] [place] [address] Dept.  Phone  [phone] THINPREP PAP SMEAR ONLY [[id number] CUSTOM]     Future Labs/Procedures Expected by Expires    HPV HIGH RISK , THIN PREP COLLECTION [[id number] CUSTOM]  9/17/2020 9/17/2021    THINPREP PAP SMEAR B [[id number] CUSTOM]  9/17/2020 9/17/2021    [facility] DEBBIE 2D+3D SCREENING [employer] Activation Code: [email or web address]  Expires: 11/16/2020  9:48 AM    4. Enter your Zip Code and Date of Birth (mm/dd/yyyy) as indicated and click Next. You will be taken to the next sign-up page. 5. Create a [employer] Username.  This will be your MyChart lo Communicate with a [facility] Physician or JEREMY online. The physician will respond and provide   a treatment plan within a few hours.  ONLINE VISIT  Primary Care Providers  Treatment for mild illness or injury that does not require immediate attention VIDEO VISITS

## (undated) NOTE — LETTER
Date & Time: 10/31/2018, 6:15 PM  Patient: Brennan Zhang  Encounter Provider(s):    Wero Whipple MD       To Whom It May Concern:    Lawanda Boast was seen and treated in our department on 10/31/2018. She should not return to work until 11/02/2018. Mckenzie Arroyo